# Patient Record
Sex: MALE | Race: BLACK OR AFRICAN AMERICAN | NOT HISPANIC OR LATINO | URBAN - METROPOLITAN AREA
[De-identification: names, ages, dates, MRNs, and addresses within clinical notes are randomized per-mention and may not be internally consistent; named-entity substitution may affect disease eponyms.]

---

## 2020-11-13 ENCOUNTER — INPATIENT (INPATIENT)
Facility: HOSPITAL | Age: 60
LOS: 0 days | Discharge: LEFT BEFORE DISCHARGE | End: 2020-11-13
Attending: INTERNAL MEDICINE | Admitting: INTERNAL MEDICINE
Payer: COMMERCIAL

## 2020-11-13 VITALS
SYSTOLIC BLOOD PRESSURE: 137 MMHG | DIASTOLIC BLOOD PRESSURE: 102 MMHG | TEMPERATURE: 99 F | OXYGEN SATURATION: 100 % | RESPIRATION RATE: 18 BRPM | HEART RATE: 92 BPM

## 2020-11-13 VITALS
DIASTOLIC BLOOD PRESSURE: 88 MMHG | OXYGEN SATURATION: 100 % | RESPIRATION RATE: 20 BRPM | SYSTOLIC BLOOD PRESSURE: 128 MMHG | HEART RATE: 77 BPM

## 2020-11-13 DIAGNOSIS — R06.02 SHORTNESS OF BREATH: ICD-10-CM

## 2020-11-13 LAB
ALBUMIN SERPL ELPH-MCNC: 4.9 G/DL — SIGNIFICANT CHANGE UP (ref 3.3–5)
ALP SERPL-CCNC: 50 U/L — SIGNIFICANT CHANGE UP (ref 40–120)
ALT FLD-CCNC: 58 U/L — HIGH (ref 4–41)
ANION GAP SERPL CALC-SCNC: 10 MMO/L — SIGNIFICANT CHANGE UP (ref 7–14)
APTT BLD: 30.1 SEC — SIGNIFICANT CHANGE UP (ref 27–36.3)
AST SERPL-CCNC: 48 U/L — HIGH (ref 4–40)
BILIRUB SERPL-MCNC: 1.4 MG/DL — HIGH (ref 0.2–1.2)
BUN SERPL-MCNC: 14 MG/DL — SIGNIFICANT CHANGE UP (ref 7–23)
CALCIUM SERPL-MCNC: 10 MG/DL — SIGNIFICANT CHANGE UP (ref 8.4–10.5)
CHLORIDE SERPL-SCNC: 104 MMOL/L — SIGNIFICANT CHANGE UP (ref 98–107)
CO2 SERPL-SCNC: 26 MMOL/L — SIGNIFICANT CHANGE UP (ref 22–31)
CREAT SERPL-MCNC: 1.15 MG/DL — SIGNIFICANT CHANGE UP (ref 0.5–1.3)
D DIMER BLD IA.RAPID-MCNC: 220 NG/ML — SIGNIFICANT CHANGE UP
GLUCOSE SERPL-MCNC: 78 MG/DL — SIGNIFICANT CHANGE UP (ref 70–99)
HCT VFR BLD CALC: 50.5 % — HIGH (ref 39–50)
HGB BLD-MCNC: 16.7 G/DL — SIGNIFICANT CHANGE UP (ref 13–17)
INR BLD: 1.16 — SIGNIFICANT CHANGE UP (ref 0.88–1.16)
MAGNESIUM SERPL-MCNC: 2.1 MG/DL — SIGNIFICANT CHANGE UP (ref 1.6–2.6)
MCHC RBC-ENTMCNC: 28.7 PG — SIGNIFICANT CHANGE UP (ref 27–34)
MCHC RBC-ENTMCNC: 33.1 % — SIGNIFICANT CHANGE UP (ref 32–36)
MCV RBC AUTO: 86.9 FL — SIGNIFICANT CHANGE UP (ref 80–100)
NRBC # FLD: 0 K/UL — SIGNIFICANT CHANGE UP (ref 0–0)
PHOSPHATE SERPL-MCNC: 2.1 MG/DL — LOW (ref 2.5–4.5)
PLATELET # BLD AUTO: 231 K/UL — SIGNIFICANT CHANGE UP (ref 150–400)
PMV BLD: 10.3 FL — SIGNIFICANT CHANGE UP (ref 7–13)
POTASSIUM SERPL-MCNC: 3.9 MMOL/L — SIGNIFICANT CHANGE UP (ref 3.5–5.3)
POTASSIUM SERPL-SCNC: 3.9 MMOL/L — SIGNIFICANT CHANGE UP (ref 3.5–5.3)
PROT SERPL-MCNC: 8.1 G/DL — SIGNIFICANT CHANGE UP (ref 6–8.3)
PROTHROM AB SERPL-ACNC: 13.2 SEC — SIGNIFICANT CHANGE UP (ref 10.6–13.6)
RBC # BLD: 5.81 M/UL — HIGH (ref 4.2–5.8)
RBC # FLD: 13.2 % — SIGNIFICANT CHANGE UP (ref 10.3–14.5)
SODIUM SERPL-SCNC: 140 MMOL/L — SIGNIFICANT CHANGE UP (ref 135–145)
TROPONIN T, HIGH SENSITIVITY: 10 NG/L — SIGNIFICANT CHANGE UP (ref ?–14)
TROPONIN T, HIGH SENSITIVITY: 11 NG/L — SIGNIFICANT CHANGE UP (ref ?–14)
WBC # BLD: 9.78 K/UL — SIGNIFICANT CHANGE UP (ref 3.8–10.5)
WBC # FLD AUTO: 9.78 K/UL — SIGNIFICANT CHANGE UP (ref 3.8–10.5)

## 2020-11-13 PROCEDURE — 93010 ELECTROCARDIOGRAM REPORT: CPT

## 2020-11-13 PROCEDURE — 99285 EMERGENCY DEPT VISIT HI MDM: CPT

## 2020-11-13 PROCEDURE — 71046 X-RAY EXAM CHEST 2 VIEWS: CPT | Mod: 26

## 2020-11-13 NOTE — ED PROVIDER NOTE - CLINICAL SUMMARY MEDICAL DECISION MAKING FREE TEXT BOX
Frieda Sanchez MD: 61yo M with PMH of MVP, HTN, who presents with dizziness, decreased appetite, insomnia, SOB x2 days. Plan for MVP surgery in near future. Dizziness sudden in onset, now resolved. No FNDs on exam. Pt is well appearing, hemodynamically stable. Afebrile. Will check labs, ddimer, troponin, CXR, contact cardiologist 59yo M with PMH of MVP, HTN, who presents with dizziness, decreased appetite, insomnia, SOB x2 days. Plan for MVP surgery in near future. Dizziness sudden in onset, now resolved. No FNDs on exam. Pt is well appearing, hemodynamically stable. Afebrile. Will check labs, ddimer, troponin, CXR, contact cardiologist

## 2020-11-13 NOTE — ED ADULT NURSE NOTE - OBJECTIVE STATEMENT
Patient arrives to the ED for weakness and feeling SOB since yesterday. Patient A&Ox4. Patient denies any chest pain, dizziness,dysuria, trouble moving his bowels, or headache. Patient reports he did not sleep well last night.  Patient reports he is in need of a valve replacement. Patient breathing even and nonlabored. No swelling noted to bilateral upper or lower extremities. Pedal pulses palpable bilaterally. Lung sounds clear to auscultation. Even and equal strength to upper and lower extremities. No arm or leg drift noted bilaterally. No facial droop noted. Speech and clear and concise. 20g IV placed to left arm. Labs sent. Cardiac Monitor on -sinus rhythm.

## 2020-11-13 NOTE — ED PROVIDER NOTE - PHYSICAL EXAMINATION
CONSTITUTIONAL: Nontoxic, well nourished, well developed, elderly male, resting comfortably in no acute distress  HEAD: Normocephalic; atraumatic  EYES: Normal inspection, EOMI  ENMT: External appears normal; normal oropharynx  NECK: Supple; non-tender; no cervical lymphadenopathy  CARD: RRR; holosystolic murmur  RESP: No respiratory distress, lungs ctab/l  ABD: Soft, non-distended; non-tender; no rebound or guarding  EXT: No LE pitting edema or calf tenderness; distal pulses intact with good capillary refill  SKIN: Warm, dry, intact  NEURO: aaox3, CN II-IX intact, 5/5 strength b/l UE and LE, sensation intact in all extremities

## 2020-11-13 NOTE — ED PROVIDER NOTE - ATTENDING CONTRIBUTION TO CARE
I performed a history and physical exam of the patient and discussed their management with the resident. I reviewed the resident's note and agree with the documented findings and plan of care. I have edited as appropriate. My medical decision making and observations are found above.  NAD, non labored breathing

## 2020-11-13 NOTE — ED PROVIDER NOTE - OBJECTIVE STATEMENT
Frieda Sanchez MD: 59yo M with PMH of MVP, HTN, who presents with dizziness, decreased appetite, insomnia, SOB x2 night. Sent by cardiologist. Pt states when he stood up, room was spinning, lasting a few minutes before resolving spontaneous. States that he can "hear his heart pump" and "water go through his stomach." No loss of taste or smell, LOC, vision or hearing changes, fever, CP, hematuria, melena, hematochezia, V/D. Last took oxycodone 5mg on Tuesday, says he gets from friend, takes it for R shoulder pain. Plan for open heart surgery in November. Recent  for godmother on Monday, decreased appetite since then.     Cardiology: Keaton Mann 61yo M with PMH of MVP, HTN, who presents with dizziness, decreased appetite, insomnia, SOB x2 night. Sent by cardiologist. Pt states when he stood up, room was spinning, lasting a few minutes before resolving spontaneous. States that he can "hear his heart pump" and "water go through his stomach." No loss of taste or smell, LOC, vision or hearing changes, fever, CP, hematuria, melena, hematochezia, V/D. Last took oxycodone 5mg on Tuesday, says he gets from friend, takes it for R shoulder pain. Plan for open heart surgery in November. Recent  for godmother on Monday, decreased appetite since then.     Cardiology: Keaton Mann

## 2020-11-13 NOTE — ED PROVIDER NOTE - PROGRESS NOTE DETAILS
Frieda Sanchez MD: Spoke to Dr. Nelson [phone] who states that severe MR from MVP with LA and LV dilated and had been traveling at least 2 hours to his cardiology appointments. Requesting to have pt be evaluated by cardiology team here for more rapid f/u. Labs unremarkable. Trop stable. CXR clear. Will admit to tele Frieda Sanchez MD: The patient is requesting to leave the Emergency Department despite the recommendation of admission to the hospital and/or further testing in the Emergency Department. It has been explained to the patient that leaving prior to completion of work up and treatment may result in recurrent or worsening of symptoms, severe permanent disability, pain and suffering, and/or death. The risks, benefits and treatment alternatives  pertaining to the patient's specific medical issue were discussed.The patient has demonstrated comprehension and verbalizes understanding of these risks.  The patient demonstrates fluent and appropriate speech and coherent thought process. All lab and test results available at the time of the discussion with the patient were communicated to the patient. The patient has been made aware that he/she is welcome to return to the Emergency Department for continued care at any time. Follow up with primary care doctor was recommended as soon as possible. The patient has been given the opportunity to ask questions about their medical condition and had all their questions answered.

## 2020-11-13 NOTE — ED ADULT TRIAGE NOTE - CHIEF COMPLAINT QUOTE
pt amb to triage c/o dizziness associated w/ nausea, decreased PO intake, tiredness, difficulty breathing and insomnia, denies covid contacts, states has been having increased stressors, Hx mitral valve prolapse, "I have 2 holes in my heart" denies CP @ this time, completing sentences
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
## 2020-11-13 NOTE — ED PROVIDER NOTE - NS ED ROS FT
General: denies fever, chills  HENT: denies nasal congestion, sore throat, rhinorrhea  Eyes: denies vision changes  CV: denies chest pain  Resp: +difficulty breathing, denies cough  Abdominal: denies nausea, vomiting, diarrhea, abdominal pain, blood in stool, dark stool  : denies pain with urination  MSK: denies recent trauma  Neuro: denies headaches, numbness, tingling, lightheadedness, +dizziness  Skin: denies new rashes  Endocrine: denies recent weight loss

## 2020-11-14 LAB
B PERT DNA SPEC QL NAA+PROBE: SIGNIFICANT CHANGE UP
C PNEUM DNA SPEC QL NAA+PROBE: SIGNIFICANT CHANGE UP
FLUAV H1 2009 PAND RNA SPEC QL NAA+PROBE: SIGNIFICANT CHANGE UP
FLUAV H1 RNA SPEC QL NAA+PROBE: SIGNIFICANT CHANGE UP
FLUAV H3 RNA SPEC QL NAA+PROBE: SIGNIFICANT CHANGE UP
FLUAV SUBTYP SPEC NAA+PROBE: SIGNIFICANT CHANGE UP
FLUBV RNA SPEC QL NAA+PROBE: SIGNIFICANT CHANGE UP
HADV DNA SPEC QL NAA+PROBE: SIGNIFICANT CHANGE UP
HCOV PNL SPEC NAA+PROBE: SIGNIFICANT CHANGE UP
HMPV RNA SPEC QL NAA+PROBE: SIGNIFICANT CHANGE UP
HPIV1 RNA SPEC QL NAA+PROBE: SIGNIFICANT CHANGE UP
HPIV2 RNA SPEC QL NAA+PROBE: SIGNIFICANT CHANGE UP
HPIV3 RNA SPEC QL NAA+PROBE: SIGNIFICANT CHANGE UP
HPIV4 RNA SPEC QL NAA+PROBE: SIGNIFICANT CHANGE UP
RAPID RVP RESULT: SIGNIFICANT CHANGE UP
RSV RNA SPEC QL NAA+PROBE: SIGNIFICANT CHANGE UP
RV+EV RNA SPEC QL NAA+PROBE: SIGNIFICANT CHANGE UP
SARS-COV-2 RNA SPEC QL NAA+PROBE: SIGNIFICANT CHANGE UP

## 2020-11-16 ENCOUNTER — INPATIENT (INPATIENT)
Facility: HOSPITAL | Age: 60
LOS: 2 days | Discharge: ANOTHER HOSPITAL | End: 2020-11-19
Attending: INTERNAL MEDICINE | Admitting: INTERNAL MEDICINE
Payer: COMMERCIAL

## 2020-11-16 VITALS
SYSTOLIC BLOOD PRESSURE: 122 MMHG | DIASTOLIC BLOOD PRESSURE: 81 MMHG | TEMPERATURE: 98 F | HEART RATE: 99 BPM | OXYGEN SATURATION: 100 % | RESPIRATION RATE: 16 BRPM

## 2020-11-16 NOTE — ED ADULT TRIAGE NOTE - CHIEF COMPLAINT QUOTE
C/o sob and dizziness since Thursday. Seen here on Thursday and was supposed to stay for "mitral valve replacement" but "needed to take care of business so I didn't stay". Denies chest pain, endorses tightness. H/O HTN.

## 2020-11-17 DIAGNOSIS — R06.02 SHORTNESS OF BREATH: ICD-10-CM

## 2020-11-17 DIAGNOSIS — R42 DIZZINESS AND GIDDINESS: ICD-10-CM

## 2020-11-17 DIAGNOSIS — I34.1 NONRHEUMATIC MITRAL (VALVE) PROLAPSE: ICD-10-CM

## 2020-11-17 DIAGNOSIS — I10 ESSENTIAL (PRIMARY) HYPERTENSION: ICD-10-CM

## 2020-11-17 LAB
ALBUMIN SERPL ELPH-MCNC: 4.2 G/DL — SIGNIFICANT CHANGE UP (ref 3.3–5)
ALP SERPL-CCNC: 41 U/L — SIGNIFICANT CHANGE UP (ref 40–120)
ALT FLD-CCNC: 63 U/L — HIGH (ref 4–41)
ANION GAP SERPL CALC-SCNC: 11 MMO/L — SIGNIFICANT CHANGE UP (ref 7–14)
APTT BLD: 27.2 SEC — SIGNIFICANT CHANGE UP (ref 27–36.3)
AST SERPL-CCNC: 45 U/L — HIGH (ref 4–40)
BASE EXCESS BLDV CALC-SCNC: 1.8 MMOL/L — SIGNIFICANT CHANGE UP
BASOPHILS # BLD AUTO: 0.06 K/UL — SIGNIFICANT CHANGE UP (ref 0–0.2)
BASOPHILS NFR BLD AUTO: 0.7 % — SIGNIFICANT CHANGE UP (ref 0–2)
BILIRUB SERPL-MCNC: 0.6 MG/DL — SIGNIFICANT CHANGE UP (ref 0.2–1.2)
BLOOD GAS VENOUS - CREATININE: 1.24 MG/DL — SIGNIFICANT CHANGE UP (ref 0.5–1.3)
BLOOD GAS VENOUS - FIO2: 21 — SIGNIFICANT CHANGE UP
BUN SERPL-MCNC: 20 MG/DL — SIGNIFICANT CHANGE UP (ref 7–23)
CALCIUM SERPL-MCNC: 9.4 MG/DL — SIGNIFICANT CHANGE UP (ref 8.4–10.5)
CHLORIDE BLDV-SCNC: 112 MMOL/L — HIGH (ref 96–108)
CHLORIDE SERPL-SCNC: 106 MMOL/L — SIGNIFICANT CHANGE UP (ref 98–107)
CO2 SERPL-SCNC: 23 MMOL/L — SIGNIFICANT CHANGE UP (ref 22–31)
CREAT SERPL-MCNC: 1.27 MG/DL — SIGNIFICANT CHANGE UP (ref 0.5–1.3)
EOSINOPHIL # BLD AUTO: 0.28 K/UL — SIGNIFICANT CHANGE UP (ref 0–0.5)
EOSINOPHIL NFR BLD AUTO: 3.2 % — SIGNIFICANT CHANGE UP (ref 0–6)
GAS PNL BLDV: 137 MMOL/L — SIGNIFICANT CHANGE UP (ref 136–146)
GLUCOSE BLDV-MCNC: 87 MG/DL — SIGNIFICANT CHANGE UP (ref 70–99)
GLUCOSE SERPL-MCNC: 93 MG/DL — SIGNIFICANT CHANGE UP (ref 70–99)
HCO3 BLDV-SCNC: 26 MMOL/L — SIGNIFICANT CHANGE UP (ref 20–27)
HCT VFR BLD CALC: 42.7 % — SIGNIFICANT CHANGE UP (ref 39–50)
HCT VFR BLDV CALC: 45.8 % — SIGNIFICANT CHANGE UP (ref 39–51)
HGB BLD-MCNC: 14.1 G/DL — SIGNIFICANT CHANGE UP (ref 13–17)
HGB BLDV-MCNC: 14.9 G/DL — SIGNIFICANT CHANGE UP (ref 13–17)
IMM GRANULOCYTES NFR BLD AUTO: 0.3 % — SIGNIFICANT CHANGE UP (ref 0–1.5)
INR BLD: 1.16 — SIGNIFICANT CHANGE UP (ref 0.88–1.16)
LACTATE BLDV-MCNC: 1.4 MMOL/L — SIGNIFICANT CHANGE UP (ref 0.5–2)
LYMPHOCYTES # BLD AUTO: 2.37 K/UL — SIGNIFICANT CHANGE UP (ref 1–3.3)
LYMPHOCYTES # BLD AUTO: 27 % — SIGNIFICANT CHANGE UP (ref 13–44)
MCHC RBC-ENTMCNC: 28.8 PG — SIGNIFICANT CHANGE UP (ref 27–34)
MCHC RBC-ENTMCNC: 33 % — SIGNIFICANT CHANGE UP (ref 32–36)
MCV RBC AUTO: 87.3 FL — SIGNIFICANT CHANGE UP (ref 80–100)
MONOCYTES # BLD AUTO: 0.8 K/UL — SIGNIFICANT CHANGE UP (ref 0–0.9)
MONOCYTES NFR BLD AUTO: 9.1 % — SIGNIFICANT CHANGE UP (ref 2–14)
NEUTROPHILS # BLD AUTO: 5.23 K/UL — SIGNIFICANT CHANGE UP (ref 1.8–7.4)
NEUTROPHILS NFR BLD AUTO: 59.7 % — SIGNIFICANT CHANGE UP (ref 43–77)
NRBC # FLD: 0 K/UL — SIGNIFICANT CHANGE UP (ref 0–0)
NT-PROBNP SERPL-SCNC: 121.9 PG/ML — SIGNIFICANT CHANGE UP
PCO2 BLDV: 39 MMHG — LOW (ref 41–51)
PH BLDV: 7.44 PH — HIGH (ref 7.32–7.43)
PLATELET # BLD AUTO: 186 K/UL — SIGNIFICANT CHANGE UP (ref 150–400)
PMV BLD: 10.2 FL — SIGNIFICANT CHANGE UP (ref 7–13)
PO2 BLDV: 63 MMHG — HIGH (ref 35–40)
POTASSIUM BLDV-SCNC: 3.5 MMOL/L — SIGNIFICANT CHANGE UP (ref 3.4–4.5)
POTASSIUM SERPL-MCNC: 3.9 MMOL/L — SIGNIFICANT CHANGE UP (ref 3.5–5.3)
POTASSIUM SERPL-SCNC: 3.9 MMOL/L — SIGNIFICANT CHANGE UP (ref 3.5–5.3)
PROT SERPL-MCNC: 6.7 G/DL — SIGNIFICANT CHANGE UP (ref 6–8.3)
PROTHROM AB SERPL-ACNC: 13.2 SEC — SIGNIFICANT CHANGE UP (ref 10.6–13.6)
RBC # BLD: 4.89 M/UL — SIGNIFICANT CHANGE UP (ref 4.2–5.8)
RBC # FLD: 13.5 % — SIGNIFICANT CHANGE UP (ref 10.3–14.5)
SAO2 % BLDV: 92.6 % — HIGH (ref 60–85)
SARS-COV-2 RNA SPEC QL NAA+PROBE: SIGNIFICANT CHANGE UP
SODIUM SERPL-SCNC: 140 MMOL/L — SIGNIFICANT CHANGE UP (ref 135–145)
TROPONIN T, HIGH SENSITIVITY: 10 NG/L — SIGNIFICANT CHANGE UP (ref ?–14)
TROPONIN T, HIGH SENSITIVITY: 10 NG/L — SIGNIFICANT CHANGE UP (ref ?–14)
WBC # BLD: 8.77 K/UL — SIGNIFICANT CHANGE UP (ref 3.8–10.5)
WBC # FLD AUTO: 8.77 K/UL — SIGNIFICANT CHANGE UP (ref 3.8–10.5)

## 2020-11-17 PROCEDURE — 99223 1ST HOSP IP/OBS HIGH 75: CPT

## 2020-11-17 PROCEDURE — 99284 EMERGENCY DEPT VISIT MOD MDM: CPT

## 2020-11-17 PROCEDURE — 93306 TTE W/DOPPLER COMPLETE: CPT | Mod: 26

## 2020-11-17 PROCEDURE — 71045 X-RAY EXAM CHEST 1 VIEW: CPT | Mod: 26

## 2020-11-17 RX ORDER — INFLUENZA VIRUS VACCINE 15; 15; 15; 15 UG/.5ML; UG/.5ML; UG/.5ML; UG/.5ML
0.5 SUSPENSION INTRAMUSCULAR ONCE
Refills: 0 | Status: DISCONTINUED | OUTPATIENT
Start: 2020-11-17 | End: 2020-11-19

## 2020-11-17 RX ORDER — ACETAMINOPHEN 500 MG
650 TABLET ORAL EVERY 6 HOURS
Refills: 0 | Status: DISCONTINUED | OUTPATIENT
Start: 2020-11-17 | End: 2020-11-19

## 2020-11-17 RX ORDER — AMLODIPINE BESYLATE 2.5 MG/1
10 TABLET ORAL DAILY
Refills: 0 | Status: DISCONTINUED | OUTPATIENT
Start: 2020-11-17 | End: 2020-11-19

## 2020-11-17 RX ORDER — ENOXAPARIN SODIUM 100 MG/ML
40 INJECTION SUBCUTANEOUS DAILY
Refills: 0 | Status: DISCONTINUED | OUTPATIENT
Start: 2020-11-17 | End: 2020-11-19

## 2020-11-17 RX ORDER — CYCLOBENZAPRINE HYDROCHLORIDE 10 MG/1
5 TABLET, FILM COATED ORAL THREE TIMES A DAY
Refills: 0 | Status: DISCONTINUED | OUTPATIENT
Start: 2020-11-17 | End: 2020-11-19

## 2020-11-17 RX ORDER — LOSARTAN POTASSIUM 100 MG/1
100 TABLET, FILM COATED ORAL DAILY
Refills: 0 | Status: DISCONTINUED | OUTPATIENT
Start: 2020-11-17 | End: 2020-11-19

## 2020-11-17 RX ADMIN — AMLODIPINE BESYLATE 10 MILLIGRAM(S): 2.5 TABLET ORAL at 13:35

## 2020-11-17 RX ADMIN — ENOXAPARIN SODIUM 40 MILLIGRAM(S): 100 INJECTION SUBCUTANEOUS at 13:35

## 2020-11-17 RX ADMIN — LOSARTAN POTASSIUM 100 MILLIGRAM(S): 100 TABLET, FILM COATED ORAL at 13:35

## 2020-11-17 NOTE — ED ADULT NURSE NOTE - OBJECTIVE STATEMENT
break coverage RN - pt received in room 16 A&OX3 c/o SOB & lightheadedness. Pt seen here on Thursday but AMA'd due to personal reasons. Pt came back today to be admitted for cardiac evaluation. Denies CP, fevers, N/V/D, abdominal pain. NSR on cardiac monitor. +SOB on exertion. Resp even and unlabored. 20G IV placed to L AC. Labs drawn and sent. Will continue to monitor.

## 2020-11-17 NOTE — PATIENT PROFILE ADULT - SURGICAL SITE INCISION
HCS received via drop-off. Form to be completed and picked up to mother (Sanjana Banuelos) at [phone]. Form placed in NIKITA Cuevas folder at the .    [facility]: 12/02/2019   Provider: Jo Ann  Sibling (? Of ?): 1 of 2  DEEDEE attached (Y/N)? N    Thank You,  Matthias Lemus   no

## 2020-11-17 NOTE — H&P ADULT - NSHPLABSRESULTS_GEN_ALL_CORE
11-17    140  |  106  |  20  ----------------------------<  93  3.9   |  23  |  1.27    Ca    9.4      17 Nov 2020 00:40    TPro  6.7  /  Alb  4.2  /  TBili  0.6  /  DBili  x   /  AST  45<H>  /  ALT  63<H>  /  AlkPhos  41  11-17                            14.1   8.77  )-----------( 186      ( 17 Nov 2020 00:40 )             42.7       Troponin T, High Sensitivity: 10 ng/L (11-17-20 @ 01:50)  Troponin T, High Sensitivity: 10 ng/L (11-17-20 @ 00:40)      Serum Pro-Brain Natriuretic Peptide: 121.9 pg/mL (11-17-20 @ 00:40)      PT/INR - ( 17 Nov 2020 00:40 )   PT: 13.2 SEC;   INR: 1.16          PTT - ( 17 Nov 2020 00:40 )  PTT:27.2 SEC
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
## 2020-11-17 NOTE — SBIRT NOTE ADULT - NSSBIRTDRGPOSREINDET_GEN_A_CORE
Full screen positive. Brief Intervention Performed. Passive Referral To Treatment Attempted. Screening results were reviewed with the patient and patient was provided information about healthy guidelines and potential negative consequences associated with level of risk. Motivation and readiness to reduce or stop use was discussed and goals and activities to make changes were suggested/offered. Options discussed for further evaluation and treatment, but referral to treatment was not completed because: Patient refused

## 2020-11-17 NOTE — H&P ADULT - NSHPREVIEWOFSYSTEMS_GEN_ALL_CORE
REVIEW OF SYSTEMS    General:  Negative  Skin/Breast:  Negative  Ophthalmologic:  Negative  ENMT:  Negative  Respiratory and Thorax: MEDELLIN, no cough  Cardiovascular:  orthopnea, no cp or palpitations  Gastrointestinal:  Negative  Genitourinary:  Negative  Musculoskeletal:  Negative  Neurological:  dizziness  Psychiatric:  Negative  Hematology/Lymphatics:  Negative	  Endocrine:  Negative  Allergic/Immunologic: Negative

## 2020-11-17 NOTE — ED ADULT NURSE REASSESSMENT NOTE - NS ED NURSE REASSESS COMMENT FT1
report received from DONNY Vo. pt resting comfortably in stretcher, denies CP SOB palpitations, rpt trop sent, pending admission, no further interventions, will monitor

## 2020-11-17 NOTE — PATIENT PROFILE ADULT - PATIENT REPRESENTATIVE: ( YOU CAN CHOOSE ANY PERSON THAT CAN ASSIST YOU WITH YOUR HEALTH CARE PREFERENCES, DOES NOT HAVE TO BE A SPOUSE, IMMEDIATE FAMILY OR SIGNIFICANT OTHER/PARTNER)
Verbal order provided to the pharmacist Bindu at [facility]. No further questions at this time.    declines

## 2020-11-17 NOTE — SBIRT NOTE ADULT - NSSBIRTBRIEFINTDET_GEN_A_CORE
Pt receptive to engagement in consult and agreeable to reviewing healthy drinking guidelines. This writer provided pt with ARS/DASANDYRS Addiction Services at [facility]: [address], [address], [address] [place] [address] p: [phone], [facility] walk in clinic p:[phone], [facility] Treatment/Care Services for Substance Use List, and [facility] For Tobacco Control Information: [address] [place] [address] , [place] [address] p: [phone], as pt endorses recent titration down from cigarette use and desire to fully refrain from use. This writer additionally reviewed "Rethink Drinking" booklet from [employer] - [place] Department of Health and Human Services.

## 2020-11-17 NOTE — ED PROVIDER NOTE - ATTENDING CONTRIBUTION TO CARE
59 y/o M with h/o HTN, active smoker p/w SOB and dizziness.  Pt reports 7+ months of progressive SOB with exertion, associated with dizziness and chest tightness.  Pt reports he now cannot walk a block without getting winded, whereas he previously was able to walk 5 blocks.  No fever, chills, back pain, abd pain, n/v, leg pain or swelling.  He had an outpatient echo by his cardiologist Dr Mann for pre-op clearance in Oct 2020 and found to have severe MR.  Pt was scheduled for outpatient surgical repair, but due to travel issues (cardiologist is in [place] and pt lives in [place]), he has been having difficulty following up.  Pt's cardiologist referred him to [facility] to transfer care here.  He was evaluated in the ED on 11/13 and left AMA prior to admission.  Well appearing, lying comfortably in stretcher, awake and alert, nontoxic.  VSS.  Lungs cta bl.  Cards nl S1/S2, RRR, no MRG.  Abd soft ntnd.  No pedal edema or calf tenderness.
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
## 2020-11-17 NOTE — ED PROVIDER NOTE - CLINICAL SUMMARY MEDICAL DECISION MAKING FREE TEXT BOX
Presents with shortness of breath and lightheadedness ongoing for few days, no change. Presents to be evaluated by cardiology for MV repair. Will do labs to eval for pneumonia, ACS - low suspicion. Admit to tele doc of the day.

## 2020-11-17 NOTE — ED PROVIDER NOTE - OBJECTIVE STATEMENT
61yo M with PMH of MVP, HTN, who presents few days of shortness of breath and lightheadedness. Seen here 2 days ago - was supposed to be admitted but pt AMA for personal reasons. Came today to be admitted for cardiology evaluation. No change in sxs since he was last seen and denies any new symptoms. Denies fever, chills, nausea, vomiting, diarrhea, chest pain, cough, leg swelling, urinary complains.   Cardiologist: Dr. Keaton Mann [phone] 59yo M with PMH of MVP, HTN, who presents with several months of shortness of breath and lightheadedness. Seen here 2 days ago - was supposed to be admitted but pt AMA for personal reasons. Came today to be admitted for cardiology evaluation. No change in sxs since he was last seen and denies any new symptoms. Denies fever, chills, nausea, vomiting, diarrhea, chest pain, cough, leg swelling, urinary complains.   Cardiologist: Dr. Keaton Mann [phone] 59yo M with PMH of MVP, HTN, who presents with several months of shortness of breath and lightheadedness. Seen here 2 days ago - was supposed to be admitted for cardiology workup for MVP (team at that had spoken with his cardiologist) but pt AMA for personal reasons. Came today to be admitted for cardiology evaluation. No change in sxs since he was last seen and denies any new symptoms. Denies fever, chills, nausea, vomiting, diarrhea, chest pain, cough, leg swelling, urinary complains.   Cardiologist: Dr. Keaton Mann [phone]

## 2020-11-17 NOTE — ED PROVIDER NOTE - PHYSICAL EXAMINATION
Gen: well developed and well nourished, NAD  CV: +murmur worse at apical region  Resp: CTAB, symmetric breath sounds  GI: abdomen soft non-distended, NTTP  Extremities - no edema  Skin: no rashes, colors changes or bruising  Neuro: A&Ox3, following commands, speech clear, moving all four extremities spontaneously  Psych: appropriate mood, normal insight

## 2020-11-17 NOTE — SBIRT NOTE ADULT - NSSBIRTALCPOSREINDET_GEN_A_CORE
Pt AAOx3, euthymic mood and congruent affect, speech clear and concise, behavior appropriate to this writer. Full screen positive. Brief Intervention Performed. Passive Referral To Treatment Attempted. Screening results were reviewed with the patient and patient was provided information about healthy guidelines and potential negative consequences associated with level of risk. Motivation and readiness to reduce or stop use was discussed and goals and activities to make changes were suggested/offered. Options discussed for further evaluation and treatment, but referral to treatment was not completed because: Patient refused   Pt declines interest in connecting to inpt/outpt/detox or AA meetings at this time.

## 2020-11-17 NOTE — H&P ADULT - NSHPPHYSICALEXAM_GEN_ALL_CORE
Vital Signs Last 24 Hrs  T(C): 36.8 (17 Nov 2020 04:41), Max: 36.9 (16 Nov 2020 23:14)  T(F): 98.2 (17 Nov 2020 04:41), Max: 98.5 (16 Nov 2020 23:14)  HR: 64 (17 Nov 2020 04:41) (64 - 99)  BP: 105/71 (17 Nov 2020 04:41) (105/71 - 122/81)  BP(mean): --  RR: 18 (17 Nov 2020 04:41) (16 - 18)  SpO2: 100% (17 Nov 2020 04:41) (99% - 100%)    PHYSICAL EXAM:  GENERAL: No Acute Distress  EYES: conjunctiva and sclera clear  ENMT: Moist mucous membranes   NECK: Supple  PULMONARY: Clear to auscultation bilaterally  CARDIAC: Regular rate and rhythm; 3/6 holosystolic murmur with diminished S2  GASTROINTESTINAL: Abdomen soft, Nontender, Nondistended; Bowel sounds normal  EXTREMITIES:   No clubbing, cyanosis, or pedal edema  PSYCH: Normal Affect, Normal Behavior  NEUROLOGY:   - Mental status A&O x 3,  SKIN: No rashes or lesions  MUSCULOSKELETAL: No joint swelling
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
## 2020-11-17 NOTE — SBIRT NOTE ADULT - NSSBIRTAUDITDRGSCORE_GEN_A_CORE_CAL
[facility] Outreach  A Part of the [facility] First Episode of Psychosis Program     Patient Name: Eloy Storm  /Age:  2001 (17 year old)    Contact: Writer received VM from Eloy's Mom-Michelle with additional observations recently that Eloy seems to be withdrawing in some ways socially, for example, not wanting to go into public places/restaurants etc. Michelle asked that writer pass this along to [facility]IGATE FÁTIMA Tilley LGSW for consideration in her work with Eloy.     Writer called Michelle back and acknowledged information shared and plan to pass along to NAVIGATE FÁTIMA Tilley LGSW. Additionally informed Michelle Lyons did not make his appointment with Katelynn on this date and he has missed a number of his recent appointments. Mom plans to follow-up with him regarding this and also writer verbalized plan for NAVIGATE FÁTIMA Tilley LGSW to reach out to Eloy to discuss engagement in the program.     Plan: Will route note to team. Next family session scheduled on . Client and family aware they can reach out to writer directly and/or [facility] team should concerns or needs arise prior to next scheduled appointment.     Keisha Charles AM, LGSW  [facility] [place] [facility]     3

## 2020-11-17 NOTE — SBIRT NOTE ADULT - NSSBIRTNALOXDUR_GEN_A_CORE
Call for yellowing of skin, fevers, enlarged spleen, pale skin, brown urine  Continue folic acid daily  Follow up once a year   30

## 2020-11-17 NOTE — SBIRT NOTE ADULT - NSSBIRTDRGBRIEFINTDET_GEN_A_CORE
This writer reviewed Mja Effects on the Body literature. Motivational interviewing provided and risk reduction discussed. This writer educated and encouraged pt to discuss medical mja card with PCP, if deemed medically eligible.   This writer reviewed Opioid/Heroine Effects on the Body literature

## 2020-11-18 LAB
ALBUMIN SERPL ELPH-MCNC: 4.1 G/DL — SIGNIFICANT CHANGE UP (ref 3.3–5)
ALP SERPL-CCNC: 39 U/L — LOW (ref 40–120)
ALT FLD-CCNC: 61 U/L — HIGH (ref 4–41)
ANION GAP SERPL CALC-SCNC: 11 MMO/L — SIGNIFICANT CHANGE UP (ref 7–14)
ANION GAP SERPL CALC-SCNC: 11 MMO/L — SIGNIFICANT CHANGE UP (ref 7–14)
AST SERPL-CCNC: 41 U/L — HIGH (ref 4–40)
BILIRUB SERPL-MCNC: 0.8 MG/DL — SIGNIFICANT CHANGE UP (ref 0.2–1.2)
BUN SERPL-MCNC: 20 MG/DL — SIGNIFICANT CHANGE UP (ref 7–23)
BUN SERPL-MCNC: 20 MG/DL — SIGNIFICANT CHANGE UP (ref 7–23)
CALCIUM SERPL-MCNC: 9.1 MG/DL — SIGNIFICANT CHANGE UP (ref 8.4–10.5)
CALCIUM SERPL-MCNC: 9.1 MG/DL — SIGNIFICANT CHANGE UP (ref 8.4–10.5)
CHLORIDE SERPL-SCNC: 106 MMOL/L — SIGNIFICANT CHANGE UP (ref 98–107)
CHLORIDE SERPL-SCNC: 106 MMOL/L — SIGNIFICANT CHANGE UP (ref 98–107)
CO2 SERPL-SCNC: 22 MMOL/L — SIGNIFICANT CHANGE UP (ref 22–31)
CO2 SERPL-SCNC: 22 MMOL/L — SIGNIFICANT CHANGE UP (ref 22–31)
CREAT SERPL-MCNC: 1.21 MG/DL — SIGNIFICANT CHANGE UP (ref 0.5–1.3)
CREAT SERPL-MCNC: 1.21 MG/DL — SIGNIFICANT CHANGE UP (ref 0.5–1.3)
GLUCOSE SERPL-MCNC: 79 MG/DL — SIGNIFICANT CHANGE UP (ref 70–99)
GLUCOSE SERPL-MCNC: 79 MG/DL — SIGNIFICANT CHANGE UP (ref 70–99)
HCT VFR BLD CALC: 44.7 % — SIGNIFICANT CHANGE UP (ref 39–50)
HCV AB S/CO SERPL IA: 6.13 S/CO — HIGH (ref 0–0.99)
HCV AB SERPL-IMP: REACTIVE — HIGH
HGB BLD-MCNC: 14.3 G/DL — SIGNIFICANT CHANGE UP (ref 13–17)
MAGNESIUM SERPL-MCNC: 2 MG/DL — SIGNIFICANT CHANGE UP (ref 1.6–2.6)
MCHC RBC-ENTMCNC: 28.5 PG — SIGNIFICANT CHANGE UP (ref 27–34)
MCHC RBC-ENTMCNC: 32 % — SIGNIFICANT CHANGE UP (ref 32–36)
MCV RBC AUTO: 89.2 FL — SIGNIFICANT CHANGE UP (ref 80–100)
NRBC # FLD: 0 K/UL — SIGNIFICANT CHANGE UP (ref 0–0)
PHOSPHATE SERPL-MCNC: 2.8 MG/DL — SIGNIFICANT CHANGE UP (ref 2.5–4.5)
PLATELET # BLD AUTO: 179 K/UL — SIGNIFICANT CHANGE UP (ref 150–400)
PMV BLD: 10.7 FL — SIGNIFICANT CHANGE UP (ref 7–13)
POTASSIUM SERPL-MCNC: 3.9 MMOL/L — SIGNIFICANT CHANGE UP (ref 3.5–5.3)
POTASSIUM SERPL-MCNC: 3.9 MMOL/L — SIGNIFICANT CHANGE UP (ref 3.5–5.3)
POTASSIUM SERPL-SCNC: 3.9 MMOL/L — SIGNIFICANT CHANGE UP (ref 3.5–5.3)
POTASSIUM SERPL-SCNC: 3.9 MMOL/L — SIGNIFICANT CHANGE UP (ref 3.5–5.3)
PROT SERPL-MCNC: 6.2 G/DL — SIGNIFICANT CHANGE UP (ref 6–8.3)
RBC # BLD: 5.01 M/UL — SIGNIFICANT CHANGE UP (ref 4.2–5.8)
RBC # FLD: 13.4 % — SIGNIFICANT CHANGE UP (ref 10.3–14.5)
SODIUM SERPL-SCNC: 139 MMOL/L — SIGNIFICANT CHANGE UP (ref 135–145)
SODIUM SERPL-SCNC: 139 MMOL/L — SIGNIFICANT CHANGE UP (ref 135–145)
WBC # BLD: 7.67 K/UL — SIGNIFICANT CHANGE UP (ref 3.8–10.5)
WBC # FLD AUTO: 7.67 K/UL — SIGNIFICANT CHANGE UP (ref 3.8–10.5)

## 2020-11-18 PROCEDURE — 93312 ECHO TRANSESOPHAGEAL: CPT | Mod: 26

## 2020-11-18 PROCEDURE — 93880 EXTRACRANIAL BILAT STUDY: CPT | Mod: 26

## 2020-11-18 PROCEDURE — 93325 DOPPLER ECHO COLOR FLOW MAPG: CPT | Mod: 26,GC

## 2020-11-18 PROCEDURE — 99232 SBSQ HOSP IP/OBS MODERATE 35: CPT

## 2020-11-18 PROCEDURE — 93320 DOPPLER ECHO COMPLETE: CPT | Mod: 26,GC

## 2020-11-18 RX ADMIN — AMLODIPINE BESYLATE 10 MILLIGRAM(S): 2.5 TABLET ORAL at 06:24

## 2020-11-18 RX ADMIN — LOSARTAN POTASSIUM 100 MILLIGRAM(S): 100 TABLET, FILM COATED ORAL at 06:24

## 2020-11-18 RX ADMIN — ENOXAPARIN SODIUM 40 MILLIGRAM(S): 100 INJECTION SUBCUTANEOUS at 12:33

## 2020-11-18 NOTE — PROGRESS NOTE ADULT - SUBJECTIVE AND OBJECTIVE BOX
Cardiology/Vascular Medicine Inpatient Progress Note    No current cardiac complaints  Denies having CP, SOB, palpitations    Reviewed TTE findings with patient   Plan for PATEL today  Probable LHC and CTS evaluation tomorrow    No significant interval events noted on telemetry    Vital Signs Last 24 Hrs  T(C): 36.4 (18 Nov 2020 06:22), Max: 37.1 (17 Nov 2020 21:30)  T(F): 97.6 (18 Nov 2020 06:22), Max: 98.8 (17 Nov 2020 21:30)  HR: 68 (18 Nov 2020 06:22) (64 - 70)  BP: 118/87 (18 Nov 2020 06:22) (111/76 - 135/92)  BP(mean): --  RR: 18 (18 Nov 2020 06:22) (18 - 18)  SpO2: 100% (18 Nov 2020 06:22) (99% - 100%)    Appearance: NAD, laying flat in bed  HEENT:   No JVD  Cardiovascular: Normal S1 S2, 3/6 SM  Respiratory: Decreased breath sounds bilateral bases  Psychiatry: Awake, alert  Gastrointestinal:  Soft, Non-tender, + BS	  Extremities: No edema    MEDICATIONS  (STANDING):  amLODIPine   Tablet 10 milliGRAM(s) Oral daily  enoxaparin Injectable 40 milliGRAM(s) SubCutaneous daily  influenza   Vaccine 0.5 milliLiter(s) IntraMuscular once  losartan 100 milliGRAM(s) Oral daily    MEDICATIONS  (PRN):  acetaminophen   Tablet .. 650 milliGRAM(s) Oral every 6 hours PRN Temp greater or equal to 38C (100.4F), Moderate Pain (4 - 6), Severe Pain (7 - 10)  cyclobenzaprine 5 milliGRAM(s) Oral three times a day PRN Muscle Spasm      LABS:	 	                                   14.3   7.67  )-----------( 179      ( 18 Nov 2020 05:27 )             44.7   11-17    140  |  106  |  20  ----------------------------<  93  3.9   |  23  |  1.27    Ca    9.4      17 Nov 2020 00:40    TPro  6.7  /  Alb  4.2  /  TBili  0.6  /  DBili  x   /  AST  45<H>  /  ALT  63<H>  /  AlkPhos  41  11-17                  proBNP: Serum Pro-Brain Natriuretic Peptide: 121.9 pg/mL (11-17-20 @ 00:40)    < from: Xray Chest 1 View- PORTABLE-Urgent (Xray Chest 1 View- PORTABLE-Urgent .) (11.17.20 @ 00:52) >    EXAM:  XR CHEST PORTABLE URGENT 1V        PROCEDURE DATE:  Nov 17 2020         INTERPRETATION:  CLINICAL INDICATION: Shortness of breath    TECHNIQUE: Single view of the chest was obtained.    COMPARISON: Chest radiograph 11/13/2020.    FINDINGS:    Lungs are clear. No pleural effusion or pneumothorax.  Cardiac size is normal. No acute osseous abnormality.    IMPRESSION:  No focal consolidation    PARAMJIT TURNER MD; Resident Radiology  This document has been electronically signed.  CATARINO REHMAN MD; Attending Radiologist  This document has been electronically signed. Nov 17 2020  7:24AM    < end of copied text >	    ec< from: Transthoracic Echocardiogram (11.17.20 @ 05:43) >    Patient name: HEATH ORTEZ  YOB: 1960   Age: 60 (M)   MR#: [id number]  Study Date: 11/17/2020  [facility]  Study quality: Technically good  Referring Physician: Gianluca Bush MD  Blood Pressure: 117/70 mmHg  Height: 175 cm  Weight: 76 kg  BSA: 1.9 m2  ------------------------------------------------------------------------  PROCEDURE: Transthoracic echocardiogram with 2-D, M-Mode  and complete spectral and color flow Doppler.  INDICATION: Nonrheumatic mitral (valve) prolapse (I34.1)  ------------------------------------------------------------------------  DIMENSIONS:  Dimensions:     Normal Values:  LA:     4.9 cm    2.0 - 4.0 cm  Ao:     3.5 cm    2.0 - 3.8 cm  SEPTUM: 0.8 cm    0.6 - 1.2 cm  PWT:    0.8 cm    0.6 - 1.1 cm  LVIDd:  5.9 cm    3.0 - 5.6 cm  LVIDs:  3.7 cm    1.8 - 4.0 cm  Derived Variables:  LVMI: 94 g/m2  RWT: 0.27  Fractional short: 37 %  Ejection Fraction (Teicholtz): 66 %  ------------------------------------------------------------------------  OBSERVATIONS:  Mitral Valve: Mitral valve prolapse involving the posterior  mitral leaflet. Severe mitral regurgitation with an  eccentric, anteriorly directed jet.  Aortic Root: Normal aortic root.  Aortic Valve: Calcified trileaflet aortic valve with normal  opening. Minimal aortic regurgitation.  Left Atrium: Moderately dilated left atrium.  LA volume  index = 48 cc/m2.  Left Ventricle: Normal left ventricular systolic function.  No segmental wall motion abnormalities. Mild left  ventricular enlargement.  Right Heart: Normal right atrium. Normal right ventricular  size and function. Normal tricuspid valve.  Mild tricuspid  regurgitation. Normal pulmonic valve.  Pericardium/PleuraNormal pericardium with no pericardial  effusion.  ------------------------------------------------------------------------  CONCLUSIONS:  1. Mitral valve prolapse involving the posterior mitral  leaflet. Severe mitral regurgitation with an eccentric,  anteriorly directed jet.  2. Moderately dilated left atrium.  LA volume index = 48  cc/m2.  3. Mild left ventricular enlargement.  4. Normal left ventricular systolic function. No segmental  wall motion abnormalities.  5. Normal right ventricular size and function.  Consider PATEL for further evaluation of the mitral valve, if  clinically indicated.  ------------------------------------------------------------------------  Confirmed on  11/17/2020 - 16:12:37 by Anthony Montejo M.D.  ------------------------------------------------------------------------    < end of copied text >
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
## 2020-11-19 ENCOUNTER — TRANSCRIPTION ENCOUNTER (OUTPATIENT)
Age: 60
End: 2020-11-19

## 2020-11-19 ENCOUNTER — INPATIENT (INPATIENT)
Facility: HOSPITAL | Age: 60
LOS: 7 days | Discharge: HOME | DRG: 219 | End: 2020-11-27
Attending: THORACIC SURGERY (CARDIOTHORACIC VASCULAR SURGERY) | Admitting: THORACIC SURGERY (CARDIOTHORACIC VASCULAR SURGERY)
Payer: COMMERCIAL

## 2020-11-19 VITALS
SYSTOLIC BLOOD PRESSURE: 126 MMHG | RESPIRATION RATE: 18 BRPM | DIASTOLIC BLOOD PRESSURE: 81 MMHG | TEMPERATURE: 99 F | OXYGEN SATURATION: 98 % | HEART RATE: 71 BPM

## 2020-11-19 VITALS
DIASTOLIC BLOOD PRESSURE: 77 MMHG | SYSTOLIC BLOOD PRESSURE: 135 MMHG | HEART RATE: 72 BPM | TEMPERATURE: 98 F | RESPIRATION RATE: 18 BRPM | OXYGEN SATURATION: 100 %

## 2020-11-19 DIAGNOSIS — I34.1 NONRHEUMATIC MITRAL (VALVE) PROLAPSE: ICD-10-CM

## 2020-11-19 DIAGNOSIS — I10 ESSENTIAL (PRIMARY) HYPERTENSION: ICD-10-CM

## 2020-11-19 DIAGNOSIS — I25.10 ATHEROSCLEROTIC HEART DISEASE OF NATIVE CORONARY ARTERY WITHOUT ANGINA PECTORIS: ICD-10-CM

## 2020-11-19 LAB
ANION GAP SERPL CALC-SCNC: 10 MMO/L — SIGNIFICANT CHANGE UP (ref 7–14)
BASOPHILS # BLD AUTO: 0.03 K/UL — SIGNIFICANT CHANGE UP (ref 0–0.2)
BASOPHILS NFR BLD AUTO: 0.3 % — SIGNIFICANT CHANGE UP (ref 0–2)
BUN SERPL-MCNC: 22 MG/DL — SIGNIFICANT CHANGE UP (ref 7–23)
CALCIUM SERPL-MCNC: 8.7 MG/DL — SIGNIFICANT CHANGE UP (ref 8.4–10.5)
CHLORIDE SERPL-SCNC: 105 MMOL/L — SIGNIFICANT CHANGE UP (ref 98–107)
CO2 SERPL-SCNC: 23 MMOL/L — SIGNIFICANT CHANGE UP (ref 22–31)
CREAT SERPL-MCNC: 1.22 MG/DL — SIGNIFICANT CHANGE UP (ref 0.5–1.3)
EOSINOPHIL # BLD AUTO: 0.22 K/UL — SIGNIFICANT CHANGE UP (ref 0–0.5)
EOSINOPHIL NFR BLD AUTO: 2.4 % — SIGNIFICANT CHANGE UP (ref 0–6)
GLUCOSE SERPL-MCNC: 86 MG/DL — SIGNIFICANT CHANGE UP (ref 70–99)
HCT VFR BLD CALC: 41.5 % — SIGNIFICANT CHANGE UP (ref 39–50)
HCT VFR BLD CALC: 45.1 % — SIGNIFICANT CHANGE UP (ref 39–50)
HGB BLD-MCNC: 13.7 G/DL — SIGNIFICANT CHANGE UP (ref 13–17)
HGB BLD-MCNC: 14.7 G/DL — SIGNIFICANT CHANGE UP (ref 13–17)
IMM GRANULOCYTES NFR BLD AUTO: 0.6 % — SIGNIFICANT CHANGE UP (ref 0–1.5)
LYMPHOCYTES # BLD AUTO: 1.79 K/UL — SIGNIFICANT CHANGE UP (ref 1–3.3)
LYMPHOCYTES # BLD AUTO: 19.8 % — SIGNIFICANT CHANGE UP (ref 13–44)
MAGNESIUM SERPL-MCNC: 2 MG/DL — SIGNIFICANT CHANGE UP (ref 1.6–2.6)
MCHC RBC-ENTMCNC: 28.8 PG — SIGNIFICANT CHANGE UP (ref 27–34)
MCHC RBC-ENTMCNC: 29.2 PG — SIGNIFICANT CHANGE UP (ref 27–34)
MCHC RBC-ENTMCNC: 32.6 GM/DL — SIGNIFICANT CHANGE UP (ref 32–36)
MCHC RBC-ENTMCNC: 33 % — SIGNIFICANT CHANGE UP (ref 32–36)
MCV RBC AUTO: 87.2 FL — SIGNIFICANT CHANGE UP (ref 80–100)
MCV RBC AUTO: 89.5 FL — SIGNIFICANT CHANGE UP (ref 80–100)
MONOCYTES # BLD AUTO: 0.72 K/UL — SIGNIFICANT CHANGE UP (ref 0–0.9)
MONOCYTES NFR BLD AUTO: 8 % — SIGNIFICANT CHANGE UP (ref 2–14)
NEUTROPHILS # BLD AUTO: 6.21 K/UL — SIGNIFICANT CHANGE UP (ref 1.8–7.4)
NEUTROPHILS NFR BLD AUTO: 68.9 % — SIGNIFICANT CHANGE UP (ref 43–77)
NRBC # BLD: 0 /100 WBCS — SIGNIFICANT CHANGE UP (ref 0–0)
NRBC # FLD: 0 K/UL — SIGNIFICANT CHANGE UP (ref 0–0)
PHOSPHATE SERPL-MCNC: 3.2 MG/DL — SIGNIFICANT CHANGE UP (ref 2.5–4.5)
PLATELET # BLD AUTO: 168 K/UL — SIGNIFICANT CHANGE UP (ref 150–400)
PLATELET # BLD AUTO: 174 K/UL — SIGNIFICANT CHANGE UP (ref 150–400)
PMV BLD: 9.5 FL — SIGNIFICANT CHANGE UP (ref 7–13)
POTASSIUM SERPL-MCNC: 3.8 MMOL/L — SIGNIFICANT CHANGE UP (ref 3.5–5.3)
POTASSIUM SERPL-SCNC: 3.8 MMOL/L — SIGNIFICANT CHANGE UP (ref 3.5–5.3)
RBC # BLD: 4.76 M/UL — SIGNIFICANT CHANGE UP (ref 4.2–5.8)
RBC # BLD: 5.04 M/UL — SIGNIFICANT CHANGE UP (ref 4.2–5.8)
RBC # FLD: 13.3 % — SIGNIFICANT CHANGE UP (ref 10.3–14.5)
RBC # FLD: 13.4 % — SIGNIFICANT CHANGE UP (ref 10.3–14.5)
SODIUM SERPL-SCNC: 138 MMOL/L — SIGNIFICANT CHANGE UP (ref 135–145)
WBC # BLD: 7.13 K/UL — SIGNIFICANT CHANGE UP (ref 3.8–10.5)
WBC # BLD: 9.02 K/UL — SIGNIFICANT CHANGE UP (ref 3.8–10.5)
WBC # FLD AUTO: 7.13 K/UL — SIGNIFICANT CHANGE UP (ref 3.8–10.5)
WBC # FLD AUTO: 9.02 K/UL — SIGNIFICANT CHANGE UP (ref 3.8–10.5)

## 2020-11-19 PROCEDURE — 99232 SBSQ HOSP IP/OBS MODERATE 35: CPT

## 2020-11-19 PROCEDURE — 99152 MOD SED SAME PHYS/QHP 5/>YRS: CPT

## 2020-11-19 PROCEDURE — 93460 R&L HRT ART/VENTRICLE ANGIO: CPT | Mod: 26

## 2020-11-19 PROCEDURE — 71045 X-RAY EXAM CHEST 1 VIEW: CPT | Mod: 26

## 2020-11-19 PROCEDURE — 99222 1ST HOSP IP/OBS MODERATE 55: CPT

## 2020-11-19 PROCEDURE — 93010 ELECTROCARDIOGRAM REPORT: CPT | Mod: 59

## 2020-11-19 RX ORDER — SODIUM CHLORIDE 9 MG/ML
3 INJECTION INTRAMUSCULAR; INTRAVENOUS; SUBCUTANEOUS EVERY 8 HOURS
Refills: 0 | Status: DISCONTINUED | OUTPATIENT
Start: 2020-11-19 | End: 2020-11-21

## 2020-11-19 RX ORDER — METOPROLOL TARTRATE 50 MG
12.5 TABLET ORAL DAILY
Refills: 0 | Status: DISCONTINUED | OUTPATIENT
Start: 2020-11-20 | End: 2020-11-21

## 2020-11-19 RX ORDER — ASPIRIN/CALCIUM CARB/MAGNESIUM 324 MG
81 TABLET ORAL DAILY
Refills: 0 | Status: DISCONTINUED | OUTPATIENT
Start: 2020-11-20 | End: 2020-11-21

## 2020-11-19 RX ORDER — AMLODIPINE BESYLATE 2.5 MG/1
10 TABLET ORAL DAILY
Refills: 0 | Status: DISCONTINUED | OUTPATIENT
Start: 2020-11-19 | End: 2020-11-19

## 2020-11-19 RX ORDER — AMLODIPINE BESYLATE 2.5 MG/1
2.5 TABLET ORAL DAILY
Refills: 0 | Status: DISCONTINUED | OUTPATIENT
Start: 2020-11-20 | End: 2020-11-21

## 2020-11-19 RX ORDER — ATORVASTATIN CALCIUM 80 MG/1
20 TABLET, FILM COATED ORAL AT BEDTIME
Refills: 0 | Status: DISCONTINUED | OUTPATIENT
Start: 2020-11-20 | End: 2020-11-21

## 2020-11-19 RX ADMIN — LOSARTAN POTASSIUM 100 MILLIGRAM(S): 100 TABLET, FILM COATED ORAL at 05:26

## 2020-11-19 RX ADMIN — SODIUM CHLORIDE 3 MILLILITER(S): 9 INJECTION INTRAMUSCULAR; INTRAVENOUS; SUBCUTANEOUS at 22:42

## 2020-11-19 RX ADMIN — ENOXAPARIN SODIUM 40 MILLIGRAM(S): 100 INJECTION SUBCUTANEOUS at 12:43

## 2020-11-19 RX ADMIN — AMLODIPINE BESYLATE 10 MILLIGRAM(S): 2.5 TABLET ORAL at 05:27

## 2020-11-19 NOTE — H&P ADULT - NSHPSOCIALHISTORY_GEN_ALL_CORE
Patient states his is , lives at home with wife and family. He works multiple jobs at [employer], , etc.  Patient states occasional/social marijuana use, smokes 3-4 cigarettes a day but quit smoking once he found out he needs surgery. Patient admits to ETOH use (3 beers/day x 4 per week).

## 2020-11-19 NOTE — DISCHARGE NOTE PROVIDER - NSDCMRMEDTOKEN_GEN_ALL_CORE_FT
amLODIPine 10 mg oral tablet: 1 tab(s) orally once a day  cyclobenzaprine 5 mg oral tablet: 1 tab(s) orally 3 times a day, As Needed  losartan 100 mg oral tablet: 1 tab(s) orally once a day

## 2020-11-19 NOTE — DISCHARGE NOTE PROVIDER - NSDCFUSCHEDAPPT_GEN_ALL_CORE_FT
HETAH ORTEZ ; 12/09/2020 ; [facility] [id number]  HEATH ORTEZ ; 12/29/2020 ; [facility] [address]

## 2020-11-19 NOTE — DISCHARGE NOTE PROVIDER - HOSPITAL COURSE
Patient is a 59 yo M with HTN, and recently diagnosed MVP with severe regurgitation p/w dyspnea.      # MVP (mitral valve prolapse)  -Followed by cardiology   - TTE: EF 66%, MVP, severe MR, moderately dilated LA, mild LV enlargement  -plan for possible surgical repair in future  -Carotid dopplers: R internal carotid 1-15% stenosis; LICA:16-49% stenosis  -S/P LHC+RHC: mRCA 60%, EF 60%, RFA and RFV accessed    #Essential hypertension  -continued on amlodipine and losartan.     #Orthostatic dizziness  -pt reported dizziness upon standing suddenly   -Orthostatics negative     #Hepatitis B -reactive     Patient seen and evaluated. Reviewed discharge medications with patient and attending. All new medications requiring new prescriptions were sent to the pharmacy of patient's choice. Reviewed need for prescription for previous home medications and new prescriptions sent if requested. Medically cleared/stable for discharge as per _____  with appropriate follow up. Patient understands and agrees with plan of care.      Patient is a 61 yo M with HTN, and recently diagnosed MVP with severe regurgitation p/w dyspnea.      # MVP (mitral valve prolapse)  -Followed by cardiology   - TTE: EF 66%, MVP, severe MR, moderately dilated LA, mild LV enlargement  -plan for possible surgical repair in future  -Carotid dopplers: R internal carotid 1-15% stenosis; LICA:16-49% stenosis  -S/P LHC+RHC: mRCA 60%, EF 60%, RFA and RFV accessed    #Essential hypertension  -continued on amlodipine and losartan.     #Orthostatic dizziness  -pt reported dizziness upon standing suddenly   -Orthostatics negative     #Hepatitis B -reactive     Patient seen and evaluated. Reviewed discharge medications with patient and attending. All new medications requiring new prescriptions were sent to the pharmacy of patient's choice. Reviewed need for prescription for previous home medications and new prescriptions sent if requested.   Medically cleared/stable for transfer to [facility] Cardiothoracic surgery evaluation for Mitral valve repair.     Patient understands and agrees with plan of care.      Patient is a 61 yo M with HTN, and recently diagnosed MVP with severe regurgitation p/w dyspnea.      # MVP (mitral valve prolapse)  -Followed by cardiology   - TTE: EF 66%, MVP, severe MR, moderately dilated LA, mild LV enlargement  -plan for possible surgical repair in future  -Carotid dopplers: R internal carotid 1-15% stenosis; LICA:16-49% stenosis  -S/P LHC+RHC: mRCA 60%, EF 60%, RFA and RFV accessed    #Essential hypertension  -continued on amlodipine and losartan.     #Orthostatic dizziness  -pt reported dizziness upon standing suddenly   -Orthostatics negative     #Hepatitis B -reactive     Patient seen and evaluated.   Right femoral artery access site  dressing intact, clean / dry, - no hematoma, no bleed, distal pulses intact, Lower extremity warm to touch.    Plan: Transfer to [facility] CTS service  Medically cleared/stable for transfer to [facility] Cardiothoracic surgery evaluation for Mitral valve repair.   Patient understands and agrees with plan of care, informed consent obtained, transfer papers signed.

## 2020-11-19 NOTE — H&P ADULT - NSHPPHYSICALEXAM_GEN_ALL_CORE
General: WN/WD NAD  Neurology: A&Ox3, nonfocal, SAUCEDO x 4  Head:  Normocephalic, atraumatic  ENT:  Mucosa moist, no ulcerations  Neck:  Supple, no sinuses or palpable masses  Lymphatic:  No palpable cervical, supraclavicular, axillary or inguinal adenopathy  Respiratory: CTA B/L  CV: RRR, S1S2, III/IV systolic murmur  Abdominal: Soft, NT, ND no palpable mass  MSK: No edema, + peripheral pulses, FROM all 4 extremity

## 2020-11-19 NOTE — PROGRESS NOTE ADULT - SUBJECTIVE AND OBJECTIVE BOX
Cardiology/Vascular Medicine Inpatient Progress Note    No current cardiac complaints  Denies having CP, SOB, palpitations    Reviewed TEEindings with patient   Will arrange for diagnostic LHC and CTS evaluation today    No significant interval events noted on telemetry -- SR, APCs, PVCs    Vital Signs Last 24 Hrs  T(C): 37.1 (19 Nov 2020 05:25), Max: 37.1 (19 Nov 2020 05:25)  T(F): 98.8 (19 Nov 2020 05:25), Max: 98.8 (19 Nov 2020 05:25)  HR: 70 (19 Nov 2020 05:25) (60 - 82)  BP: 123/72 (19 Nov 2020 05:25) (105/71 - 123/86)  BP(mean): --  RR: 17 (19 Nov 2020 05:25) (17 - 18)  SpO2: 100% (19 Nov 2020 05:25) (99% - 100%)    Appearance: NAD, laying flat in bed  HEENT:   No JVD  Cardiovascular: Normal S1 S2, 3/6 SM  Respiratory: Decreased breath sounds bilateral bases  Psychiatry: Awake, alert  Gastrointestinal:  Soft, Non-tender, + BS	  Extremities: No edema    MEDICATIONS  (STANDING):  amLODIPine   Tablet 10 milliGRAM(s) Oral daily  enoxaparin Injectable 40 milliGRAM(s) SubCutaneous daily  influenza   Vaccine 0.5 milliLiter(s) IntraMuscular once  losartan 100 milliGRAM(s) Oral daily    MEDICATIONS  (PRN):  acetaminophen   Tablet .. 650 milliGRAM(s) Oral every 6 hours PRN Temp greater or equal to 38C (100.4F), Moderate Pain (4 - 6), Severe Pain (7 - 10)  cyclobenzaprine 5 milliGRAM(s) Oral three times a day PRN Muscle Spasm        LABS:	 	                                13.7   7.13  )-----------( 168      ( 19 Nov 2020 06:50 )             41.5   11-19    138  |  105  |  22  ----------------------------<  86  3.8   |  23  |  1.22    Ca    8.7      19 Nov 2020 06:50  Phos  3.2     11-19  Mg     2.0     11-19    TPro  6.2  /  Alb  4.1  /  TBili  0.8  /  DBili  x   /  AST  41<H>  /  ALT  61<H>  /  AlkPhos  39<L>  11-18                                    proBNP: Serum Pro-Brain Natriuretic Peptide: 121.9 pg/mL (11-17-20 @ 00:40)    < from: Xray Chest 1 View- PORTABLE-Urgent (Xray Chest 1 View- PORTABLE-Urgent .) (11.17.20 @ 00:52) >    EXAM:  XR CHEST PORTABLE URGENT 1V        PROCEDURE DATE:  Nov 17 2020         INTERPRETATION:  CLINICAL INDICATION: Shortness of breath    TECHNIQUE: Single view of the chest was obtained.    COMPARISON: Chest radiograph 11/13/2020.    FINDINGS:    Lungs are clear. No pleural effusion or pneumothorax.  Cardiac size is normal. No acute osseous abnormality.    IMPRESSION:  No focal consolidation    PARAMJIT TURNER MD; Resident Radiology  This document has been electronically signed.  CATARINO REHMAN MD; Attending Radiologist  This document has been electronically signed. Nov 17 2020  7:24AM    < end of copied text >	    ec< from: Transthoracic Echocardiogram (11.17.20 @ 05:43) >    Patient name: HEATH ORTEZ  YOB: 1960   Age: 60 (M)   MR#: [id number]  Study Date: 11/17/2020  [facility]  Study quality: Technically good  Referring Physician: Gianluca Bush MD  Blood Pressure: 117/70 mmHg  Height: 175 cm  Weight: 76 kg  BSA: 1.9 m2  ------------------------------------------------------------------------  PROCEDURE: Transthoracic echocardiogram with 2-D, M-Mode  and complete spectral and color flow Doppler.  INDICATION: Nonrheumatic mitral (valve) prolapse (I34.1)  ------------------------------------------------------------------------  DIMENSIONS:  Dimensions:     Normal Values:  LA:     4.9 cm    2.0 - 4.0 cm  Ao:     3.5 cm    2.0 - 3.8 cm  SEPTUM: 0.8 cm    0.6 - 1.2 cm  PWT:    0.8 cm    0.6 - 1.1 cm  LVIDd:  5.9 cm    3.0 - 5.6 cm  LVIDs:  3.7 cm    1.8 - 4.0 cm  Derived Variables:  LVMI: 94 g/m2  RWT: 0.27  Fractional short: 37 %  Ejection Fraction (Teicholtz): 66 %  ------------------------------------------------------------------------  OBSERVATIONS:  Mitral Valve: Mitral valve prolapse involving the posterior  mitral leaflet. Severe mitral regurgitation with an  eccentric, anteriorly directed jet.  Aortic Root: Normal aortic root.  Aortic Valve: Calcified trileaflet aortic valve with normal  opening. Minimal aortic regurgitation.  Left Atrium: Moderately dilated left atrium.  LA volume  index = 48 cc/m2.  Left Ventricle: Normal left ventricular systolic function.  No segmental wall motion abnormalities. Mild left  ventricular enlargement.  Right Heart: Normal right atrium. Normal right ventricular  size and function. Normal tricuspid valve.  Mild tricuspid  regurgitation. Normal pulmonic valve.  Pericardium/PleuraNormal pericardium with no pericardial  effusion.  ------------------------------------------------------------------------  CONCLUSIONS:  1. Mitral valve prolapse involving the posterior mitral  leaflet. Severe mitral regurgitation with an eccentric,  anteriorly directed jet.  2. Moderately dilated left atrium.  LA volume index = 48  cc/m2.  3. Mild left ventricular enlargement.  4. Normal left ventricular systolic function. No segmental  wall motion abnormalities.  5. Normal right ventricular size and function.  Consider PATEL for further evaluation of the mitral valve, if  clinically indicated.  ------------------------------------------------------------------------  Confirmed on  11/17/2020 - 16:12:37 by Anthony Montejo M.D.  ------------------------------------------------------------------------    < end of copied text >    < from: VA Duplex Carotid Arteries, Bilateral. (11.18.20 @ 13:23) >    Patient name: HEATH ORTEZ  Date of test: 11/18/2020  MR#: [id number]  [facility] #: [id number]    Location: [facility] Physician(s): , Gianluca Bush MD  Interpreted by: Gianluca Bush MD, [facility], [facility], [facility], [place]  Tech: Ana Rawls [facility]  Type of Test: Carotid Doppler Evaluation  ------------------------------------------------------------------------  Procedure: Duplex Real-time grayscale/color Doppler  ultrasonography used to interrogate extracranial arteries  bilaterally.  Indications: Occlusion and stenosis of bilateral carotid  arteries (I65.23)  ------------------------------------------------------------------------  VELOCITY:  ------------------------------------------------------------------------  RIGHT:    Subclavian: 44 /    Prox CCA: 80/24    Mid CCA: 106/31    Dist CCA: 67/23    Prox ICA: 72/27    Mid ICA: 57 / 26    Distal ICA: 34 / 16    ECA: 83 / 17    Vertebral: 33 / 11    ICA/CCA: 0.9  ------------------------------------------------------------------------    Subclavian: Normal    CCA Plaque: Minimal    ICA Plaque: Minimal    Vertebral: Antegrade flow  ------------------------------------------------------------------------  LEFT:    Subclavian: 59 /    Prox CCA: 95/34    Mid CCA: 88/28    Dist CCA: 71/26    Prox ICA: 56/25    Mid ICA: 71 / 35    Distal ICA: 33 / 14    ECA: 68 / 20    Vertebral: 47 / 16    ICA/CCA: 0.7  ------------------------------------------------------------------------    Subclavian: Normal    CCA Plaque: Mild    ICA Plaque: Mild    Vertebral: Antegrade flow  ------------------------------------------------------------------------  Right Findings: Right Common Carotid Artery: There is  minimal heterogenous plaque noted throughout the vessel.  Right Internal Carotid Artery:  B-mode and spectral  analyses consistent with a diameter reduction of 1-15%.  There is minimal heterogenous plaque within the proximal  vessel. No hemodynamically significant stenosis.  Right Vertebral Artery:  Antegrade flow with normal  velocities.  Left Findings: Left Common Carotid Artery: There is mild  heterogenous plaque noted throughout the the vessel.  Left Internal Carotid Artery:  B-mode and spectral  analyses consistent with diameter reduction of 16-49%.  There is mild heterogenous plaque within the proximal  vessel. No hemodynamically significant stenosis.  Left Vertebral Artery:  Antegrade flow with normal  velocities.  ------------------------------------------------------------------------  Summary/Impressions:  Right Internal Carotid Artery:  There is minimal  heterogenous plaque within the proximal vessel, consistent  with a 1-15% stenosis.  No hemodynamically significant  stenosis.  Left Internal Carotid Artery:  There is mild heterogenous  plaque within the proximal vessel, consistent with a  16-49% stenosis. No hemodynamically significant stenosis.  ------------------------------------------------------------------------  Confirmed on  11/18/2020 - 6:16 PM by Gianluca Bush MD,  [facility], [facility], [place]  By signing this report, the attending physician certifies  that he or she has personally supervised and interpreted  the vascular study and has reviewed and or edited and  agrees with the written comments contained within the  report.    < end of copied text >
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
## 2020-11-19 NOTE — DISCHARGE NOTE NURSING/CASE MANAGEMENT/SOCIAL WORK - PATIENT PORTAL LINK FT
You can access the FollowMyHealth Patient Portal offered by [facility] by registering at the following website: [email or web address]. By joining [facility]’s FollowMyHealth portal, you will also be able to view your health information using other applications (apps) compatible with our system.

## 2020-11-19 NOTE — H&P ADULT - NSHPLABSRESULTS_GEN_ALL_CORE
< from: Cardiac Cath Lab - Adult (11.19.20 @ 10:03) >    VENTRICLES: Global left ventricular function was normal. EF estimated was  60 %.  VALVES: MITRAL VALVE: The mitral valve exhibited severe regurgitation.  CORONARY VESSELS: The coronary circulation is right dominant.  LM:   --  LM: Normal.  LAD:   --  Proximal LAD: There was a 20 % stenosis.  CX:  --  OM1: Angiography showed moderate atherosclerosis.  RCA:   --  Proximal RCA: There was a 60 % stenosis.  COMPLICATIONS: There were no complications.  DIAGNOSTIC RECOMMENDATIONS: Consultation with a cardiac surgeon will be  obtained for surgical opinion.  Prepared and signed by  Les Buenrostro M.D.    < end of copied text >    < from: Transthoracic Echocardiogram (11.17.20 @ 05:43) >    CONCLUSIONS:  1. Mitral valve prolapse involving the posterior mitral  leaflet. Severe mitral regurgitation with an eccentric,  anteriorly directed jet.  2. Moderately dilated left atrium.  LA volume index = 48  cc/m2.  3. Mild left ventricular enlargement.  4. Normal left ventricular systolic function. No segmental  wall motion abnormalities.  5. Normal right ventricular size and function.  Consider PATEL for further evaluation of the mitral valve, if  clinically indicated.  ------------------------------------------------------------------------  Confirmed on  11/17/2020 - 16:12:37 by Anthony Montejo M.D    < end of copied text >      `

## 2020-11-19 NOTE — H&P ADULT - NSHPREVIEWOFSYSTEMS_GEN_ALL_CORE
REVIEW OF SYSTEMS:  CONSTITUTIONAL: No fever, weight loss, or fatigue  EYES: No eye pain, visual disturbances, or discharge  ENMT:  No difficulty hearing, tinnitus, vertigo; No sinus or throat pain  NECK: No pain or stiffness  RESPIRATORY: Has non productive cough, No wheezing, chills or hemoptysis; shortness of breath  CARDIOVASCULAR: No chest pain, No palpitations, dizziness, or leg swelling  GASTROINTESTINAL: No abdominal or epigastric pain. No nausea, vomiting, or hematemesis; No diarrhea ,has No melena  GENITOURINARY: No dysuria, frequency, hematuria, or incontinence  NEUROLOGICAL: No headaches, memory loss, loss of strength, numbness, or tremors  SKIN: No itching, burning, rashes, or lesions   LYMPH NODES: No enlarged glands  ENDOCRINE: No heat or cold intolerance; No hair loss  MUSCULOSKELETAL: No joint pain or swelling; No muscle, back, or extremity pain  PSYCHIATRIC: No depression, anxiety, mood swings, or difficulty sleeping  HEME/LYMPH: No easy bruising, or bleeding gums  ALLERGY: No hives or eczema
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
## 2020-11-19 NOTE — DISCHARGE NOTE PROVIDER - CARE PROVIDER_API CALL
Gianluca Bush  CARDIOLOGY  [address] [address] [place] [address], [place] [address]  Phone: [phone]  Fax: [phone]  Follow Up Time:

## 2020-11-19 NOTE — DISCHARGE NOTE NURSING/CASE MANAGEMENT/SOCIAL WORK - NSDCPEWEB_GEN_ALL_CORE
[facility] for Tobacco Control website --- [email or web address] --- [email or web address]

## 2020-11-19 NOTE — H&P ADULT - PROBLEM SELECTOR PLAN 1
Preop MVR workup   S/P cath at [facility] - 20% stenosis of pLAD, 60% RCA, normal LM   Plan for OR Saturday?  Started on ASA/Atorv/BB
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
## 2020-11-19 NOTE — DISCHARGE NOTE NURSING/CASE MANAGEMENT/SOCIAL WORK - NSDCPEEMAIL_GEN_ALL_CORE
[facility] for Tobacco Control email [email or web address]

## 2020-11-19 NOTE — DISCHARGE NOTE PROVIDER - NSDCCPCAREPLAN_GEN_ALL_CORE_FT
PRINCIPAL DISCHARGE DIAGNOSIS  Diagnosis: SOB (shortness of breath)  Assessment and Plan of Treatment:       SECONDARY DISCHARGE DIAGNOSES  Diagnosis: Orthostatic dizziness  Assessment and Plan of Treatment: You were complaining of dizziness when standing up, which self resolved .    Diagnosis: Lightheadedness  Assessment and Plan of Treatment:

## 2020-11-20 DIAGNOSIS — I34.0 NONRHEUMATIC MITRAL (VALVE) INSUFFICIENCY: ICD-10-CM

## 2020-11-20 DIAGNOSIS — I25.10 ATHEROSCLEROTIC HEART DISEASE OF NATIVE CORONARY ARTERY WITHOUT ANGINA PECTORIS: ICD-10-CM

## 2020-11-20 LAB
A1C WITH ESTIMATED AVERAGE GLUCOSE RESULT: 5.3 % — SIGNIFICANT CHANGE UP (ref 4–5.6)
ALBUMIN SERPL ELPH-MCNC: 4.4 G/DL — SIGNIFICANT CHANGE UP (ref 3.3–5)
ALP SERPL-CCNC: 43 U/L — SIGNIFICANT CHANGE UP (ref 40–120)
ALT FLD-CCNC: 71 U/L — HIGH (ref 10–45)
ANION GAP SERPL CALC-SCNC: 10 MMOL/L — SIGNIFICANT CHANGE UP (ref 5–17)
ANION GAP SERPL CALC-SCNC: 12 MMOL/L — SIGNIFICANT CHANGE UP (ref 5–17)
APPEARANCE UR: CLEAR — SIGNIFICANT CHANGE UP
APTT BLD: 29.5 SEC — SIGNIFICANT CHANGE UP (ref 27.5–35.5)
AST SERPL-CCNC: 50 U/L — HIGH (ref 10–40)
BILIRUB SERPL-MCNC: 0.9 MG/DL — SIGNIFICANT CHANGE UP (ref 0.2–1.2)
BILIRUB UR-MCNC: NEGATIVE — SIGNIFICANT CHANGE UP
BLD GP AB SCN SERPL QL: NEGATIVE — SIGNIFICANT CHANGE UP
BLD GP AB SCN SERPL QL: NEGATIVE — SIGNIFICANT CHANGE UP
BUN SERPL-MCNC: 17 MG/DL — SIGNIFICANT CHANGE UP (ref 7–23)
BUN SERPL-MCNC: 20 MG/DL — SIGNIFICANT CHANGE UP (ref 7–23)
CALCIUM SERPL-MCNC: 8.8 MG/DL — SIGNIFICANT CHANGE UP (ref 8.4–10.5)
CALCIUM SERPL-MCNC: 9.3 MG/DL — SIGNIFICANT CHANGE UP (ref 8.4–10.5)
CHLORIDE SERPL-SCNC: 106 MMOL/L — SIGNIFICANT CHANGE UP (ref 96–108)
CHLORIDE SERPL-SCNC: 107 MMOL/L — SIGNIFICANT CHANGE UP (ref 96–108)
CO2 SERPL-SCNC: 21 MMOL/L — LOW (ref 22–31)
CO2 SERPL-SCNC: 22 MMOL/L — SIGNIFICANT CHANGE UP (ref 22–31)
COLOR SPEC: YELLOW — SIGNIFICANT CHANGE UP
CREAT SERPL-MCNC: 1.19 MG/DL — SIGNIFICANT CHANGE UP (ref 0.5–1.3)
CREAT SERPL-MCNC: 1.3 MG/DL — SIGNIFICANT CHANGE UP (ref 0.5–1.3)
DIFF PNL FLD: NEGATIVE — SIGNIFICANT CHANGE UP
ESTIMATED AVERAGE GLUCOSE: 105 MG/DL — SIGNIFICANT CHANGE UP (ref 68–114)
FIBRINOGEN PPP-MCNC: 416 MG/DL — SIGNIFICANT CHANGE UP (ref 290–520)
GLUCOSE SERPL-MCNC: 79 MG/DL — SIGNIFICANT CHANGE UP (ref 70–99)
GLUCOSE SERPL-MCNC: 86 MG/DL — SIGNIFICANT CHANGE UP (ref 70–99)
GLUCOSE UR QL: NEGATIVE — SIGNIFICANT CHANGE UP
HCT VFR BLD CALC: 40.4 % — SIGNIFICANT CHANGE UP (ref 39–50)
HGB BLD-MCNC: 13.6 G/DL — SIGNIFICANT CHANGE UP (ref 13–17)
INR BLD: 1.04 RATIO — SIGNIFICANT CHANGE UP (ref 0.88–1.16)
KETONES UR-MCNC: NEGATIVE — SIGNIFICANT CHANGE UP
LEUKOCYTE ESTERASE UR-ACNC: NEGATIVE — SIGNIFICANT CHANGE UP
MCHC RBC-ENTMCNC: 29.4 PG — SIGNIFICANT CHANGE UP (ref 27–34)
MCHC RBC-ENTMCNC: 33.7 GM/DL — SIGNIFICANT CHANGE UP (ref 32–36)
MCV RBC AUTO: 87.4 FL — SIGNIFICANT CHANGE UP (ref 80–100)
MRSA PCR RESULT.: SIGNIFICANT CHANGE UP
NITRITE UR-MCNC: NEGATIVE — SIGNIFICANT CHANGE UP
NRBC # BLD: 0 /100 WBCS — SIGNIFICANT CHANGE UP (ref 0–0)
NT-PROBNP SERPL-SCNC: 114 PG/ML — SIGNIFICANT CHANGE UP (ref 0–300)
PH UR: 6 — SIGNIFICANT CHANGE UP (ref 5–8)
PLATELET # BLD AUTO: 166 K/UL — SIGNIFICANT CHANGE UP (ref 150–400)
POTASSIUM SERPL-MCNC: 4 MMOL/L — SIGNIFICANT CHANGE UP (ref 3.5–5.3)
POTASSIUM SERPL-MCNC: 4.1 MMOL/L — SIGNIFICANT CHANGE UP (ref 3.5–5.3)
POTASSIUM SERPL-SCNC: 4 MMOL/L — SIGNIFICANT CHANGE UP (ref 3.5–5.3)
POTASSIUM SERPL-SCNC: 4.1 MMOL/L — SIGNIFICANT CHANGE UP (ref 3.5–5.3)
PROT SERPL-MCNC: 7 G/DL — SIGNIFICANT CHANGE UP (ref 6–8.3)
PROT UR-MCNC: SIGNIFICANT CHANGE UP
PROTHROM AB SERPL-ACNC: 12.4 SEC — SIGNIFICANT CHANGE UP (ref 10.6–13.6)
RBC # BLD: 4.62 M/UL — SIGNIFICANT CHANGE UP (ref 4.2–5.8)
RBC # FLD: 13.5 % — SIGNIFICANT CHANGE UP (ref 10.3–14.5)
RH IG SCN BLD-IMP: POSITIVE — SIGNIFICANT CHANGE UP
RH IG SCN BLD-IMP: POSITIVE — SIGNIFICANT CHANGE UP
S AUREUS DNA NOSE QL NAA+PROBE: SIGNIFICANT CHANGE UP
SARS-COV-2 RNA SPEC QL NAA+PROBE: SIGNIFICANT CHANGE UP
SODIUM SERPL-SCNC: 139 MMOL/L — SIGNIFICANT CHANGE UP (ref 135–145)
SODIUM SERPL-SCNC: 139 MMOL/L — SIGNIFICANT CHANGE UP (ref 135–145)
SP GR SPEC: 1.04 — HIGH (ref 1.01–1.02)
TSH SERPL-MCNC: 1.44 UIU/ML — SIGNIFICANT CHANGE UP (ref 0.27–4.2)
UROBILINOGEN FLD QL: NEGATIVE — SIGNIFICANT CHANGE UP
WBC # BLD: 8.81 K/UL — SIGNIFICANT CHANGE UP (ref 3.8–10.5)
WBC # FLD AUTO: 8.81 K/UL — SIGNIFICANT CHANGE UP (ref 3.8–10.5)

## 2020-11-20 PROCEDURE — 99232 SBSQ HOSP IP/OBS MODERATE 35: CPT | Mod: 57

## 2020-11-20 RX ADMIN — SODIUM CHLORIDE 3 MILLILITER(S): 9 INJECTION INTRAMUSCULAR; INTRAVENOUS; SUBCUTANEOUS at 22:30

## 2020-11-20 RX ADMIN — AMLODIPINE BESYLATE 2.5 MILLIGRAM(S): 2.5 TABLET ORAL at 05:02

## 2020-11-20 RX ADMIN — Medication 81 MILLIGRAM(S): at 12:19

## 2020-11-20 RX ADMIN — SODIUM CHLORIDE 3 MILLILITER(S): 9 INJECTION INTRAMUSCULAR; INTRAVENOUS; SUBCUTANEOUS at 05:03

## 2020-11-20 RX ADMIN — ATORVASTATIN CALCIUM 20 MILLIGRAM(S): 80 TABLET, FILM COATED ORAL at 22:32

## 2020-11-20 RX ADMIN — Medication 12.5 MILLIGRAM(S): at 05:02

## 2020-11-20 RX ADMIN — SODIUM CHLORIDE 3 MILLILITER(S): 9 INJECTION INTRAMUSCULAR; INTRAVENOUS; SUBCUTANEOUS at 13:00

## 2020-11-20 NOTE — PROGRESS NOTE ADULT - PROBLEM SELECTOR PLAN 1
Preop MVR workup   S/P cath at [facility] - 20% stenosis of pLAD, 60% RCA, normal LM   Plan for OR Saturday?  Started on ASA/Atorv/BB preop workup in progress   continue asa/ statin/ b- blockers  npo after midnight  ck covid  repeat type and screen   [facility] 11/21 with DR. Sarmiento Patient on Home ACEI and Norvasc for BP control   Monitor BP   Continue on Norvasc 5

## 2020-11-20 NOTE — CONSULT NOTE ADULT - SUBJECTIVE AND OBJECTIVE BOX
Cardiovascular Disease Initial Evaluation    CHIEF COMPLAINT: Transfer for mitral valve surgery     HISTORY OF PRESENT ILLNESS:  This is a 60 year old man with HTN and mitral valve prolapse with severe regurgitation who initially presented to [facility] with SOB. He has been having worsening dyspnea on exertion and orthopnea.    Mr. Schaefer underwent PATEL revealing flail posterior mitral leaflets with severe MR. Furthermore, there was moderate CAD on cath.   Currently he denies chest pain or SOB.       Allergies  No Known Allergies        MEDICATIONS:  amLODIPine   Tablet 2.5 milliGRAM(s) Oral daily  aspirin enteric coated 81 milliGRAM(s) Oral daily  metoprolol tartrate 12.5 milliGRAM(s) Oral daily    atorvastatin 20 milliGRAM(s) Oral at bedtime    sodium chloride 0.9% lock flush 3 milliLiter(s) IV Push every 8 hours      PAST MEDICAL & SURGICAL HISTORY:  HTN (hypertension)    MVP (mitral valve prolapse)        FAMILY HISTORY:  FHx: heart disease        SOCIAL HISTORY:    The patient is a nonsmoker       REVIEW OF SYSTEMS:  See HPI, otherwise complete 14 point review of systems negative      PHYSICAL EXAM:  T(C): 36.5 (11-20-20 @ 04:59), Max: 37.1 (11-19-20 @ 21:27)  HR: 73 (11-20-20 @ 04:59) (63 - 73)  BP: 118/79 (11-20-20 @ 04:59) (118/75 - 135/77)  RR: 18 (11-20-20 @ 04:59) (18 - 20)  SpO2: 99% (11-20-20 @ 04:59) (98% - 100%)  Wt(kg): --  I&O's Summary    19 Nov 2020 07:01  -  20 Nov 2020 07:00  --------------------------------------------------------  IN: 300 mL / OUT: 300 mL / NET: 0 mL    20 Nov 2020 07:01  -  20 Nov 2020 11:46  --------------------------------------------------------  IN: 120 mL / OUT: 0 mL / NET: 120 mL        Appearance: No Acute Distress; resting comfortably  HEENT:  Normal oral mucosa, PERRL, EOMI	  Cardiovascular: Normal S1 S2, No JVD, Systolic murmur  Respiratory: Normal respiratory effort; Lungs clear to auscultation bilaterally  Gastrointestinal:  Soft, Non-tender, + BS	  Skin: No rashes, No ecchymoses, No cyanosis	  Neurologic: Non-focal; no weakness  Extremities: No clubbing, cyanosis or edema  Vascular: Peripheral pulses palpable 2+ bilaterally  Psychiatry: A & O x 3, Mood & affect appropriate    Laboratory Data:	 	    CBC Full  -  ( 20 Nov 2020 06:53 )  WBC Count : 8.81 K/uL  Hemoglobin : 13.6 g/dL  Hematocrit : 40.4 %  Platelet Count - Automated : 166 K/uL  Mean Cell Volume : 87.4 fl  Mean Cell Hemoglobin : 29.4 pg  Mean Cell Hemoglobin Concentration : 33.7 gm/dL  Auto Neutrophil # : x  Auto Lymphocyte # : x  Auto Monocyte # : x  Auto Eosinophil # : x  Auto Basophil # : x  Auto Neutrophil % : x  Auto Lymphocyte % : x  Auto Monocyte % : x  Auto Eosinophil % : x  Auto Basophil % : x    11-20    139  |  107  |  17  ----------------------------<  86  4.0   |  22  |  1.19  11-19    139  |  106  |  20  ----------------------------<  79  4.1   |  21<L>  |  1.30    Ca    8.8      20 Nov 2020 06:53  Ca    9.3      19 Nov 2020 23:50  Phos  3.2     11-19  Mg     2.0     11-19    TPro  7.0  /  Alb  4.4  /  TBili  0.9  /  DBili  x   /  AST  50<H>  /  ALT  71<H>  /  AlkPhos  43  11-19      proBNP: Serum Pro-Brain Natriuretic Peptide: 114 pg/mL (11-19 @ 23:50)    Lipid Profile:   HgA1c:   TSH: Thyroid Stimulating Hormone, Serum: 1.44 uIU/mL (11-20 @ 03:31)        Interpretation of Telemetry: Sinus; no ectopy    ECG:  	Sinus      Assessment: 60 year old man with HTN, CAD and mitral valve prolapse with severe regurgitation presents with SOB.    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  Patient is currently not in decompensated CHF.   Plan for CT surgery.    #CAD-  60% RCA lesion.  Possible plan for 1 vessel CABG with MVR.    #HTN-  BP acceptable on current regimen.    #ACP (advance care planning)-  Advanced care planning was discussed with the patient.   Risks, benefits and alternatives of medical treatment and procedures were discussed in detail and all questions were answered. 30 minutes spent addressing advance care plans.      72 minutes spent on total encounter; more than 50% of the visit was spent counseling and/or coordinating care by the attending physician.   	  Luís Gold MD [facility]  Cardiovascular Diseases  [phone]

## 2020-11-20 NOTE — PROGRESS NOTE ADULT - SUBJECTIVE AND OBJECTIVE BOX
VITAL SIGNS    Telemetry:    Vital Signs Last 24 Hrs  T(C): 36.5 (20 @ 04:59), Max: 37.1 (20 @ 21:27)  T(F): 97.7 (20 @ 04:59), Max: 98.7 (20 @ 21:27)  HR: 73 (20 @ 04:59) (63 - 73)  BP: 118/79 (20 @ 04:59) (118/75 - 135/77)  RR: 18 (20 @ 04:59) (18 - 20)  SpO2: 99% (20 @ 04:59) (98% - 100%)             @ 07:01  -   @ 07:00  --------------------------------------------------------  IN: 300 mL / OUT: 300 mL / NET: 0 mL     @ 07:01  -   @ 11:06  --------------------------------------------------------  IN: 120 mL / OUT: 0 mL / NET: 120 mL       Daily Height in cm: 175.26 (2020 08:02)    Daily Weight in k.8 (2020 08:02)  Admit Wt: Drug Dosing Weight  Height (cm): 175.3 (2020 08:02)  Weight (kg): 76.8 (2020 08:02)  BMI (kg/m2): 25 (2020 08:02)  BSA (m2): 1.92 (2020 08:02)    Bilirubin Total, Serum: 0.9 mg/dL ( @ 23:50)    CAPILLARY BLOOD GLUCOSE              amLODIPine   Tablet 2.5 milliGRAM(s) Oral daily  aspirin enteric coated 81 milliGRAM(s) Oral daily  atorvastatin 20 milliGRAM(s) Oral at bedtime  metoprolol tartrate 12.5 milliGRAM(s) Oral daily  sodium chloride 0.9% lock flush 3 milliLiter(s) IV Push every 8 hours      PHYSICAL EXAM    Subjective: "Hi.   Neurology: alert and oriented x 3, nonfocal, no gross deficits  CV : tele:  RSR  Sternal Wound :  CDI with dressing , Stable  Lungs: clear. RR easy, unlabored   Abdomen: soft, nontender, nondistended, positive bowel sounds, bowel movement   Neg N/V/D   :  pt voiding without difficulty   Extremities:   SAUCEDO; edema, neg calf tenderness.   PPP bilaterally      PW:  Chest tubes:                 VITAL SIGNS    Telemetry:  rsr 60-80   Vital Signs Last 24 Hrs  T(C): 36.5 (20 @ 04:59), Max: 37.1 (20 @ 21:27)  T(F): 97.7 (20 @ 04:59), Max: 98.7 (20 @ 21:27)  HR: 73 (20 @ 04:59) (63 - 73)  BP: 118/79 (20 @ 04:59) (118/75 - 135/77)  RR: 18 (20 @ 04:59) (18 - 20)  SpO2: 99% (20 @ 04:59) (98% - 100%)             @ 07:01  -   @ 07:00  --------------------------------------------------------  IN: 300 mL / OUT: 300 mL / NET: 0 mL     @ 07:01  -   @ 11:06  --------------------------------------------------------  IN: 120 mL / OUT: 0 mL / NET: 120 mL       Daily Height in cm: 175.26 (2020 08:02)    Daily Weight in k.8 (2020 08:02)  Admit Wt: Drug Dosing Weight  Height (cm): 175.3 (2020 08:02)  Weight (kg): 76.8 (2020 08:02)  BMI (kg/m2): 25 (2020 08:02)  BSA (m2): 1.92 (2020 08:02)    Bilirubin Total, Serum: 0.9 mg/dL ( @ 23:50)      amLODIPine   Tablet 2.5 milliGRAM(s) Oral daily  aspirin enteric coated 81 milliGRAM(s) Oral daily  atorvastatin 20 milliGRAM(s) Oral at bedtime  metoprolol tartrate 12.5 milliGRAM(s) Oral daily  sodium chloride 0.9% lock flush 3 milliLiter(s) IV Push every 8 hours      PHYSICAL EXAM    Subjective: "My surgery is tomorrow."   Neurology: alert and oriented x 3, nonfocal, no gross deficits  CV : tele:  RSR 60-80; + murmur   Lungs: clear. RR easy, unlabored   Abdomen: soft, nontender, nondistended, positive bowel sounds, + bowel movement   Neg N/V/D   :  pt voiding without difficulty   Extremities:   SAUCEDO; neg LE edema, neg calf tenderness.   PPP bilaterally; rt groin cath site cdi dieudonne- neg hematoma

## 2020-11-20 NOTE — PROGRESS NOTE ADULT - PROBLEM SELECTOR PLAN 2
Patient on Home ACEI and Norvasc for BP control   Monitor BP   Continue on Norvasc 5 preop workup in progress   continue asa/ statin/ b- blockers  npo after midnight  ck covid  repeat type and screen   [facility] 11/21 with DR. Sarmiento
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
## 2020-11-20 NOTE — PROGRESS NOTE ADULT - PROBLEM SELECTOR PLAN 3
preop workup in progress   continue asa/ statin/ b- blockers  npo after midnight  ck covid  repeat type and screen   [facility] 11/21 with DR. Sarmiento
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
## 2020-11-21 ENCOUNTER — RESULT REVIEW (OUTPATIENT)
Age: 60
End: 2020-11-21

## 2020-11-21 ENCOUNTER — APPOINTMENT (OUTPATIENT)
Dept: CARDIOTHORACIC SURGERY | Facility: HOSPITAL | Age: 60
End: 2020-11-21

## 2020-11-21 LAB
ALBUMIN SERPL ELPH-MCNC: 3.5 G/DL — SIGNIFICANT CHANGE UP (ref 3.3–5)
ALP SERPL-CCNC: 29 U/L — LOW (ref 40–120)
ALT FLD-CCNC: 60 U/L — HIGH (ref 10–45)
ANION GAP SERPL CALC-SCNC: 10 MMOL/L — SIGNIFICANT CHANGE UP (ref 5–17)
APTT BLD: 28.8 SEC — SIGNIFICANT CHANGE UP (ref 27.5–35.5)
AST SERPL-CCNC: 76 U/L — HIGH (ref 10–40)
BASE EXCESS BLDMV CALC-SCNC: -0.2 MMOL/L — SIGNIFICANT CHANGE UP (ref -3–3)
BASE EXCESS BLDMV CALC-SCNC: -0.5 MMOL/L — SIGNIFICANT CHANGE UP (ref -3–3)
BASE EXCESS BLDMV CALC-SCNC: 0.1 MMOL/L — SIGNIFICANT CHANGE UP (ref -3–3)
BASE EXCESS BLDMV CALC-SCNC: 0.4 MMOL/L — SIGNIFICANT CHANGE UP (ref -3–3)
BASE EXCESS BLDV CALC-SCNC: -0.5 MMOL/L — SIGNIFICANT CHANGE UP (ref -2–2)
BASE EXCESS BLDV CALC-SCNC: 0.9 MMOL/L — SIGNIFICANT CHANGE UP (ref -2–2)
BASE EXCESS BLDV CALC-SCNC: 1 MMOL/L — SIGNIFICANT CHANGE UP (ref -2–2)
BASE EXCESS BLDV CALC-SCNC: 1.4 MMOL/L — SIGNIFICANT CHANGE UP (ref -2–2)
BILIRUB SERPL-MCNC: 1.2 MG/DL — SIGNIFICANT CHANGE UP (ref 0.2–1.2)
BUN SERPL-MCNC: 14 MG/DL — SIGNIFICANT CHANGE UP (ref 7–23)
CA-I SERPL-SCNC: 1.03 MMOL/L — LOW (ref 1.12–1.3)
CA-I SERPL-SCNC: 1.06 MMOL/L — LOW (ref 1.12–1.3)
CA-I SERPL-SCNC: 1.06 MMOL/L — LOW (ref 1.12–1.3)
CA-I SERPL-SCNC: 1.38 MMOL/L — HIGH (ref 1.12–1.3)
CALCIUM SERPL-MCNC: 9.5 MG/DL — SIGNIFICANT CHANGE UP (ref 8.4–10.5)
CHLORIDE BLDV-SCNC: 104 MMOL/L — SIGNIFICANT CHANGE UP (ref 96–108)
CHLORIDE BLDV-SCNC: 105 MMOL/L — SIGNIFICANT CHANGE UP (ref 96–108)
CHLORIDE BLDV-SCNC: 105 MMOL/L — SIGNIFICANT CHANGE UP (ref 96–108)
CHLORIDE BLDV-SCNC: 106 MMOL/L — SIGNIFICANT CHANGE UP (ref 96–108)
CHLORIDE SERPL-SCNC: 106 MMOL/L — SIGNIFICANT CHANGE UP (ref 96–108)
CK MB BLD-MCNC: 11.3 % — HIGH (ref 0–3.5)
CK MB CFR SERPL CALC: 63.2 NG/ML — HIGH (ref 0–6.7)
CK SERPL-CCNC: 557 U/L — HIGH (ref 30–200)
CO2 BLDMV-SCNC: 25 MMOL/L — SIGNIFICANT CHANGE UP (ref 21–29)
CO2 BLDMV-SCNC: 25 MMOL/L — SIGNIFICANT CHANGE UP (ref 21–29)
CO2 BLDMV-SCNC: 26 MMOL/L — SIGNIFICANT CHANGE UP (ref 21–29)
CO2 BLDMV-SCNC: 26 MMOL/L — SIGNIFICANT CHANGE UP (ref 21–29)
CO2 BLDV-SCNC: 26 MMOL/L — SIGNIFICANT CHANGE UP (ref 22–30)
CO2 BLDV-SCNC: 27 MMOL/L — SIGNIFICANT CHANGE UP (ref 22–30)
CO2 BLDV-SCNC: 27 MMOL/L — SIGNIFICANT CHANGE UP (ref 22–30)
CO2 BLDV-SCNC: 28 MMOL/L — SIGNIFICANT CHANGE UP (ref 22–30)
CO2 SERPL-SCNC: 20 MMOL/L — LOW (ref 22–31)
CREAT SERPL-MCNC: 1.16 MG/DL — SIGNIFICANT CHANGE UP (ref 0.5–1.3)
FIBRINOGEN PPP-MCNC: 282 MG/DL — LOW (ref 290–520)
GAS PNL BLDA: SIGNIFICANT CHANGE UP
GAS PNL BLDMV: SIGNIFICANT CHANGE UP
GAS PNL BLDV: 133 MMOL/L — LOW (ref 135–145)
GAS PNL BLDV: 136 MMOL/L — SIGNIFICANT CHANGE UP (ref 135–145)
GAS PNL BLDV: SIGNIFICANT CHANGE UP
GLUCOSE BLDV-MCNC: 103 MG/DL — HIGH (ref 70–99)
GLUCOSE BLDV-MCNC: 104 MG/DL — HIGH (ref 70–99)
GLUCOSE BLDV-MCNC: 104 MG/DL — HIGH (ref 70–99)
GLUCOSE BLDV-MCNC: 106 MG/DL — HIGH (ref 70–99)
GLUCOSE SERPL-MCNC: 134 MG/DL — HIGH (ref 70–99)
HCO3 BLDMV-SCNC: 24 MMOL/L — SIGNIFICANT CHANGE UP (ref 20–28)
HCO3 BLDMV-SCNC: 25 MMOL/L — SIGNIFICANT CHANGE UP (ref 20–28)
HCO3 BLDV-SCNC: 25 MMOL/L — SIGNIFICANT CHANGE UP (ref 21–29)
HCO3 BLDV-SCNC: 25 MMOL/L — SIGNIFICANT CHANGE UP (ref 21–29)
HCO3 BLDV-SCNC: 26 MMOL/L — SIGNIFICANT CHANGE UP (ref 21–29)
HCO3 BLDV-SCNC: 26 MMOL/L — SIGNIFICANT CHANGE UP (ref 21–29)
HCT VFR BLD CALC: 35.8 % — LOW (ref 39–50)
HCT VFR BLDA CALC: 32 % — LOW (ref 39–50)
HCT VFR BLDA CALC: 33 % — LOW (ref 39–50)
HCT VFR BLDA CALC: 33 % — LOW (ref 39–50)
HCT VFR BLDA CALC: 34 % — LOW (ref 39–50)
HCV RNA FLD QL NAA+PROBE: POSITIVE — HIGH
HEPARINASE TEG R TIME: 6.4 MIN — SIGNIFICANT CHANGE UP (ref 4.3–8.3)
HGB BLD CALC-MCNC: 10.4 G/DL — LOW (ref 13–17)
HGB BLD CALC-MCNC: 10.7 G/DL — LOW (ref 13–17)
HGB BLD CALC-MCNC: 10.7 G/DL — LOW (ref 13–17)
HGB BLD CALC-MCNC: 11 G/DL — LOW (ref 13–17)
HGB BLD-MCNC: 12.3 G/DL — LOW (ref 13–17)
HOROWITZ INDEX BLDMV+IHG-RTO: 100 — SIGNIFICANT CHANGE UP
HOROWITZ INDEX BLDMV+IHG-RTO: 40 — SIGNIFICANT CHANGE UP
HOROWITZ INDEX BLDMV+IHG-RTO: 40 — SIGNIFICANT CHANGE UP
HOROWITZ INDEX BLDMV+IHG-RTO: 60 — SIGNIFICANT CHANGE UP
INR BLD: 1.26 RATIO — HIGH (ref 0.88–1.16)
LACTATE BLDV-MCNC: 0.8 MMOL/L — SIGNIFICANT CHANGE UP (ref 0.7–2)
LACTATE BLDV-MCNC: 0.8 MMOL/L — SIGNIFICANT CHANGE UP (ref 0.7–2)
LACTATE BLDV-MCNC: 1 MMOL/L — SIGNIFICANT CHANGE UP (ref 0.7–2)
LACTATE BLDV-MCNC: 1.3 MMOL/L — SIGNIFICANT CHANGE UP (ref 0.7–2)
MAGNESIUM SERPL-MCNC: 2.6 MG/DL — SIGNIFICANT CHANGE UP (ref 1.6–2.6)
MCHC RBC-ENTMCNC: 29.7 PG — SIGNIFICANT CHANGE UP (ref 27–34)
MCHC RBC-ENTMCNC: 34.4 GM/DL — SIGNIFICANT CHANGE UP (ref 32–36)
MCV RBC AUTO: 86.5 FL — SIGNIFICANT CHANGE UP (ref 80–100)
NRBC # BLD: 0 /100 WBCS — SIGNIFICANT CHANGE UP (ref 0–0)
O2 CT VFR BLD CALC: 36 MMHG — SIGNIFICANT CHANGE UP (ref 30–65)
O2 CT VFR BLD CALC: 39 MMHG — SIGNIFICANT CHANGE UP (ref 30–65)
O2 CT VFR BLD CALC: 43 MMHG — SIGNIFICANT CHANGE UP (ref 30–65)
O2 CT VFR BLD CALC: 48 MMHG — SIGNIFICANT CHANGE UP (ref 30–65)
OTHER CELLS CSF MANUAL: 13 ML/DL — LOW (ref 18–22)
OTHER CELLS CSF MANUAL: 14 ML/DL — LOW (ref 18–22)
OTHER CELLS CSF MANUAL: 15 ML/DL — LOW (ref 18–22)
OTHER CELLS CSF MANUAL: 15 ML/DL — LOW (ref 18–22)
PCO2 BLDMV: 38 MMHG — SIGNIFICANT CHANGE UP (ref 30–65)
PCO2 BLDMV: 41 MMHG — SIGNIFICANT CHANGE UP (ref 30–65)
PCO2 BLDMV: 42 MMHG — SIGNIFICANT CHANGE UP (ref 30–65)
PCO2 BLDMV: 42 MMHG — SIGNIFICANT CHANGE UP (ref 30–65)
PCO2 BLDV: 36 MMHG — SIGNIFICANT CHANGE UP (ref 35–50)
PCO2 BLDV: 43 MMHG — SIGNIFICANT CHANGE UP (ref 35–50)
PCO2 BLDV: 44 MMHG — SIGNIFICANT CHANGE UP (ref 35–50)
PCO2 BLDV: 58 MMHG — HIGH (ref 35–50)
PH BLDMV: 7.38 — SIGNIFICANT CHANGE UP (ref 7.32–7.45)
PH BLDMV: 7.38 — SIGNIFICANT CHANGE UP (ref 7.32–7.45)
PH BLDMV: 7.39 — SIGNIFICANT CHANGE UP (ref 7.32–7.45)
PH BLDMV: 7.42 — SIGNIFICANT CHANGE UP (ref 7.32–7.45)
PH BLDV: 7.28 — LOW (ref 7.35–7.45)
PH BLDV: 7.38 — SIGNIFICANT CHANGE UP (ref 7.35–7.45)
PH BLDV: 7.39 — SIGNIFICANT CHANGE UP (ref 7.35–7.45)
PH BLDV: 7.45 — SIGNIFICANT CHANGE UP (ref 7.35–7.45)
PHOSPHATE SERPL-MCNC: 2.9 MG/DL — SIGNIFICANT CHANGE UP (ref 2.5–4.5)
PLATELET # BLD AUTO: 106 K/UL — LOW (ref 150–400)
PO2 BLDV: 104 MMHG — HIGH (ref 25–45)
PO2 BLDV: 105 MMHG — HIGH (ref 25–45)
PO2 BLDV: 60 MMHG — HIGH (ref 25–45)
PO2 BLDV: 88 MMHG — HIGH (ref 25–45)
POTASSIUM BLDV-SCNC: 4.6 MMOL/L — SIGNIFICANT CHANGE UP (ref 3.5–5.3)
POTASSIUM BLDV-SCNC: 5.1 MMOL/L — SIGNIFICANT CHANGE UP (ref 3.5–5.3)
POTASSIUM BLDV-SCNC: 5.1 MMOL/L — SIGNIFICANT CHANGE UP (ref 3.5–5.3)
POTASSIUM BLDV-SCNC: 5.5 MMOL/L — HIGH (ref 3.5–5.3)
POTASSIUM SERPL-MCNC: 4.9 MMOL/L — SIGNIFICANT CHANGE UP (ref 3.5–5.3)
POTASSIUM SERPL-SCNC: 4.9 MMOL/L — SIGNIFICANT CHANGE UP (ref 3.5–5.3)
PROT SERPL-MCNC: 5 G/DL — LOW (ref 6–8.3)
PROTHROM AB SERPL-ACNC: 14.9 SEC — HIGH (ref 10.6–13.6)
RAPIDTEG MAXIMUM AMPLITUDE: 53.3 MM — SIGNIFICANT CHANGE UP (ref 52–70)
RBC # BLD: 4.14 M/UL — LOW (ref 4.2–5.8)
RBC # FLD: 13 % — SIGNIFICANT CHANGE UP (ref 10.3–14.5)
SAO2 % BLDMV: 63 % — SIGNIFICANT CHANGE UP (ref 60–90)
SAO2 % BLDMV: 67 % — SIGNIFICANT CHANGE UP (ref 60–90)
SAO2 % BLDMV: 72 % — SIGNIFICANT CHANGE UP (ref 60–90)
SAO2 % BLDMV: 79 % — SIGNIFICANT CHANGE UP (ref 60–90)
SAO2 % BLDV: 90 % — HIGH (ref 67–88)
SAO2 % BLDV: 95 % — HIGH (ref 67–88)
SAO2 % BLDV: 98 % — HIGH (ref 67–88)
SAO2 % BLDV: 98 % — HIGH (ref 67–88)
SODIUM SERPL-SCNC: 136 MMOL/L — SIGNIFICANT CHANGE UP (ref 135–145)
TEG FUNCTIONAL FIBRINOGEN: 16.4 MM — SIGNIFICANT CHANGE UP (ref 15–32)
TEG MAXIMUM AMPLITUDE: 57.9 MM — SIGNIFICANT CHANGE UP (ref 52–69)
TEG REACTION TIME: 6.7 MIN — SIGNIFICANT CHANGE UP (ref 4.6–9.1)
TROPONIN T, HIGH SENSITIVITY RESULT: 713 NG/L — HIGH (ref 0–51)
WBC # BLD: 17.47 K/UL — HIGH (ref 3.8–10.5)
WBC # FLD AUTO: 17.47 K/UL — HIGH (ref 3.8–10.5)

## 2020-11-21 PROCEDURE — 99292 CRITICAL CARE ADDL 30 MIN: CPT

## 2020-11-21 PROCEDURE — 93010 ELECTROCARDIOGRAM REPORT: CPT

## 2020-11-21 PROCEDURE — 33510 CABG VEIN SINGLE: CPT

## 2020-11-21 PROCEDURE — 71045 X-RAY EXAM CHEST 1 VIEW: CPT | Mod: 26

## 2020-11-21 PROCEDURE — 88305 TISSUE EXAM BY PATHOLOGIST: CPT | Mod: 26

## 2020-11-21 PROCEDURE — 33427 REPAIR OF MITRAL VALVE: CPT

## 2020-11-21 PROCEDURE — 33508 ENDOSCOPIC VEIN HARVEST: CPT

## 2020-11-21 PROCEDURE — 99291 CRITICAL CARE FIRST HOUR: CPT

## 2020-11-21 RX ORDER — VASOPRESSIN 20 [USP'U]/ML
0 INJECTION INTRAVENOUS
Qty: 50 | Refills: 0 | Status: DISCONTINUED | OUTPATIENT
Start: 2020-11-21 | End: 2020-11-21

## 2020-11-21 RX ORDER — METOCLOPRAMIDE HCL 10 MG
10 TABLET ORAL EVERY 8 HOURS
Refills: 0 | Status: COMPLETED | OUTPATIENT
Start: 2020-11-21 | End: 2020-11-23

## 2020-11-21 RX ORDER — METOCLOPRAMIDE HCL 10 MG
10 TABLET ORAL EVERY 8 HOURS
Refills: 0 | Status: DISCONTINUED | OUTPATIENT
Start: 2020-11-21 | End: 2020-11-21

## 2020-11-21 RX ORDER — HYDROMORPHONE HYDROCHLORIDE 2 MG/ML
0.5 INJECTION INTRAMUSCULAR; INTRAVENOUS; SUBCUTANEOUS ONCE
Refills: 0 | Status: DISCONTINUED | OUTPATIENT
Start: 2020-11-21 | End: 2020-11-21

## 2020-11-21 RX ORDER — NOREPINEPHRINE BITARTRATE/D5W 8 MG/250ML
0.05 PLASTIC BAG, INJECTION (ML) INTRAVENOUS
Qty: 8 | Refills: 0 | Status: DISCONTINUED | OUTPATIENT
Start: 2020-11-21 | End: 2020-11-21

## 2020-11-21 RX ORDER — PANTOPRAZOLE SODIUM 20 MG/1
40 TABLET, DELAYED RELEASE ORAL DAILY
Refills: 0 | Status: DISCONTINUED | OUTPATIENT
Start: 2020-11-21 | End: 2020-11-22

## 2020-11-21 RX ORDER — DEXTROSE 50 % IN WATER 50 %
25 SYRINGE (ML) INTRAVENOUS
Refills: 0 | Status: DISCONTINUED | OUTPATIENT
Start: 2020-11-21 | End: 2020-11-21

## 2020-11-21 RX ORDER — ONDANSETRON 8 MG/1
4 TABLET, FILM COATED ORAL EVERY 6 HOURS
Refills: 0 | Status: DISCONTINUED | OUTPATIENT
Start: 2020-11-21 | End: 2020-11-24

## 2020-11-21 RX ORDER — PROPOFOL 10 MG/ML
20 INJECTION, EMULSION INTRAVENOUS
Qty: 500 | Refills: 0 | Status: DISCONTINUED | OUTPATIENT
Start: 2020-11-21 | End: 2020-11-21

## 2020-11-21 RX ORDER — NICARDIPINE HYDROCHLORIDE 30 MG/1
5 CAPSULE, EXTENDED RELEASE ORAL
Qty: 40 | Refills: 0 | Status: DISCONTINUED | OUTPATIENT
Start: 2020-11-21 | End: 2020-11-21

## 2020-11-21 RX ORDER — DEXTROSE 50 % IN WATER 50 %
50 SYRINGE (ML) INTRAVENOUS
Refills: 0 | Status: DISCONTINUED | OUTPATIENT
Start: 2020-11-21 | End: 2020-11-21

## 2020-11-21 RX ORDER — SODIUM CHLORIDE 9 MG/ML
1000 INJECTION INTRAMUSCULAR; INTRAVENOUS; SUBCUTANEOUS
Refills: 0 | Status: DISCONTINUED | OUTPATIENT
Start: 2020-11-21 | End: 2020-11-23

## 2020-11-21 RX ORDER — NALOXONE HYDROCHLORIDE 4 MG/.1ML
0.1 SPRAY NASAL
Refills: 0 | Status: DISCONTINUED | OUTPATIENT
Start: 2020-11-21 | End: 2020-11-24

## 2020-11-21 RX ORDER — DEXMEDETOMIDINE HYDROCHLORIDE IN 0.9% SODIUM CHLORIDE 4 UG/ML
0.2 INJECTION INTRAVENOUS
Qty: 200 | Refills: 0 | Status: DISCONTINUED | OUTPATIENT
Start: 2020-11-21 | End: 2020-11-22

## 2020-11-21 RX ORDER — SODIUM CHLORIDE 9 MG/ML
500 INJECTION, SOLUTION INTRAVENOUS ONCE
Refills: 0 | Status: COMPLETED | OUTPATIENT
Start: 2020-11-21 | End: 2020-11-21

## 2020-11-21 RX ORDER — NOREPINEPHRINE BITARTRATE/D5W 8 MG/250ML
0.05 PLASTIC BAG, INJECTION (ML) INTRAVENOUS
Qty: 8 | Refills: 0 | Status: DISCONTINUED | OUTPATIENT
Start: 2020-11-21 | End: 2020-11-22

## 2020-11-21 RX ORDER — CEFUROXIME AXETIL 250 MG
1500 TABLET ORAL EVERY 8 HOURS
Refills: 0 | Status: DISCONTINUED | OUTPATIENT
Start: 2020-11-21 | End: 2020-11-23

## 2020-11-21 RX ORDER — VASOPRESSIN 20 [USP'U]/ML
0 INJECTION INTRAVENOUS
Qty: 50 | Refills: 0 | Status: DISCONTINUED | OUTPATIENT
Start: 2020-11-21 | End: 2020-11-22

## 2020-11-21 RX ORDER — ACETAMINOPHEN 500 MG
1000 TABLET ORAL ONCE
Refills: 0 | Status: COMPLETED | OUTPATIENT
Start: 2020-11-21 | End: 2020-11-21

## 2020-11-21 RX ORDER — CHLORHEXIDINE GLUCONATE 213 G/1000ML
15 SOLUTION TOPICAL EVERY 12 HOURS
Refills: 0 | Status: DISCONTINUED | OUTPATIENT
Start: 2020-11-21 | End: 2020-11-21

## 2020-11-21 RX ORDER — ASPIRIN/CALCIUM CARB/MAGNESIUM 324 MG
300 TABLET ORAL ONCE
Refills: 0 | Status: COMPLETED | OUTPATIENT
Start: 2020-11-21 | End: 2020-11-21

## 2020-11-21 RX ORDER — HYDROMORPHONE HYDROCHLORIDE 2 MG/ML
0.5 INJECTION INTRAMUSCULAR; INTRAVENOUS; SUBCUTANEOUS ONCE
Refills: 0 | Status: DISCONTINUED | OUTPATIENT
Start: 2020-11-21 | End: 2020-11-22

## 2020-11-21 RX ORDER — DOBUTAMINE HCL 250MG/20ML
2.5 VIAL (ML) INTRAVENOUS
Qty: 500 | Refills: 0 | Status: DISCONTINUED | OUTPATIENT
Start: 2020-11-21 | End: 2020-11-21

## 2020-11-21 RX ORDER — HYDROMORPHONE HYDROCHLORIDE 2 MG/ML
30 INJECTION INTRAMUSCULAR; INTRAVENOUS; SUBCUTANEOUS
Refills: 0 | Status: DISCONTINUED | OUTPATIENT
Start: 2020-11-21 | End: 2020-11-24

## 2020-11-21 RX ORDER — HYDROMORPHONE HYDROCHLORIDE 2 MG/ML
0.5 INJECTION INTRAMUSCULAR; INTRAVENOUS; SUBCUTANEOUS
Refills: 0 | Status: DISCONTINUED | OUTPATIENT
Start: 2020-11-21 | End: 2020-11-24

## 2020-11-21 RX ORDER — CHLORHEXIDINE GLUCONATE 213 G/1000ML
5 SOLUTION TOPICAL
Refills: 0 | Status: DISCONTINUED | OUTPATIENT
Start: 2020-11-21 | End: 2020-11-21

## 2020-11-21 RX ORDER — ASPIRIN/CALCIUM CARB/MAGNESIUM 324 MG
300 TABLET ORAL ONCE
Refills: 0 | Status: COMPLETED | OUTPATIENT
Start: 2020-11-21 | End: 2021-10-20

## 2020-11-21 RX ADMIN — Medication 400 MILLIGRAM(S): at 19:57

## 2020-11-21 RX ADMIN — SODIUM CHLORIDE 3 MILLILITER(S): 9 INJECTION INTRAMUSCULAR; INTRAVENOUS; SUBCUTANEOUS at 06:25

## 2020-11-21 RX ADMIN — HYDROMORPHONE HYDROCHLORIDE 0.5 MILLIGRAM(S): 2 INJECTION INTRAMUSCULAR; INTRAVENOUS; SUBCUTANEOUS at 14:00

## 2020-11-21 RX ADMIN — HYDROMORPHONE HYDROCHLORIDE 0.5 MILLIGRAM(S): 2 INJECTION INTRAMUSCULAR; INTRAVENOUS; SUBCUTANEOUS at 16:00

## 2020-11-21 RX ADMIN — Medication 10 MILLIGRAM(S): at 14:23

## 2020-11-21 RX ADMIN — Medication 7.2 MICROGRAM(S)/KG/MIN: at 19:57

## 2020-11-21 RX ADMIN — SODIUM CHLORIDE 2000 MILLILITER(S): 9 INJECTION, SOLUTION INTRAVENOUS at 16:30

## 2020-11-21 RX ADMIN — Medication 100 MILLIGRAM(S): at 23:58

## 2020-11-21 RX ADMIN — HYDROMORPHONE HYDROCHLORIDE 0.5 MILLIGRAM(S): 2 INJECTION INTRAMUSCULAR; INTRAVENOUS; SUBCUTANEOUS at 16:15

## 2020-11-21 RX ADMIN — VASOPRESSIN 0.1 UNIT(S)/MIN: 20 INJECTION INTRAVENOUS at 19:57

## 2020-11-21 RX ADMIN — Medication 300 MILLIGRAM(S): at 19:48

## 2020-11-21 RX ADMIN — Medication 1000 MILLIGRAM(S): at 20:12

## 2020-11-21 RX ADMIN — Medication 10 MILLIGRAM(S): at 21:34

## 2020-11-21 RX ADMIN — HYDROMORPHONE HYDROCHLORIDE 0.5 MILLIGRAM(S): 2 INJECTION INTRAMUSCULAR; INTRAVENOUS; SUBCUTANEOUS at 13:45

## 2020-11-21 RX ADMIN — SODIUM CHLORIDE 2000 MILLILITER(S): 9 INJECTION, SOLUTION INTRAVENOUS at 15:01

## 2020-11-21 RX ADMIN — CHLORHEXIDINE GLUCONATE 15 MILLILITER(S): 213 SOLUTION TOPICAL at 17:28

## 2020-11-21 RX ADMIN — DEXMEDETOMIDINE HYDROCHLORIDE IN 0.9% SODIUM CHLORIDE 3.84 MICROGRAM(S)/KG/HR: 4 INJECTION INTRAVENOUS at 19:56

## 2020-11-21 RX ADMIN — AMLODIPINE BESYLATE 2.5 MILLIGRAM(S): 2.5 TABLET ORAL at 06:23

## 2020-11-21 RX ADMIN — SODIUM CHLORIDE 2000 MILLILITER(S): 9 INJECTION, SOLUTION INTRAVENOUS at 13:30

## 2020-11-21 RX ADMIN — Medication 100 MILLIGRAM(S): at 15:57

## 2020-11-21 RX ADMIN — Medication 12.5 MILLIGRAM(S): at 06:23

## 2020-11-21 RX ADMIN — SODIUM CHLORIDE 1000 MILLILITER(S): 9 INJECTION, SOLUTION INTRAVENOUS at 20:28

## 2020-11-21 NOTE — AIRWAY REMOVAL NOTE  ADULT & PEDS - ARTIFICAL AIRWAY REMOVAL COMMENTS
Written order for extubation verified. The patient was identified by full name and birth date compared to the identification band. Present during the procedure was Meghann Valencia RN.

## 2020-11-21 NOTE — BRIEF OPERATIVE NOTE - NSICDXBRIEFPREOP_GEN_ALL_CORE_FT
PRE-OP DIAGNOSIS:  CAD in native artery 21-Nov-2020 12:27:35  Brian Barlow  Mitral regurgitation 21-Nov-2020 12:27:25  Brian Barlow

## 2020-11-21 NOTE — PROGRESS NOTE ADULT - SUBJECTIVE AND OBJECTIVE BOX
CRITICAL CARE ATTENDING - CTICU    MEDICATIONS  (STANDING):  aspirin Suppository 300 milliGRAM(s) Rectal once  cefuroxime  IVPB 1500 milliGRAM(s) IV Intermittent every 8 hours  chlorhexidine 0.12% Liquid 15 milliLiter(s) Oral Mucosa every 12 hours  chlorhexidine 0.12% Liquid 5 milliLiter(s) Oral Mucosa two times a day  dextrose 50% Injectable 50 milliLiter(s) IV Push every 15 minutes  dextrose 50% Injectable 25 milliLiter(s) IV Push every 15 minutes  metoclopramide  IVPB 10 milliGRAM(s) IV Intermittent every 8 hours  pantoprazole  Injectable 40 milliGRAM(s) IV Push daily  sodium chloride 0.9%. 1000 milliLiter(s) (10 mL/Hr) IV Continuous <Continuous>  vasopressin Infusion 0.002 Unit(s)/Min (0.1 mL/Hr) IV Continuous <Continuous>                                    13.6   8.81  )-----------( 166      ( 20 Nov 2020 06:53 )             40.4       11-20    139  |  107  |  17  ----------------------------<  86  4.0   |  22  |  1.19    Ca    8.8      20 Nov 2020 06:53    TPro  7.0  /  Alb  4.4  /  TBili  0.9  /  DBili  x   /  AST  50<H>  /  ALT  71<H>  /  AlkPhos  43  11-19      PT/INR - ( 19 Nov 2020 23:50 )   PT: 12.4 sec;   INR: 1.04 ratio         PTT - ( 19 Nov 2020 23:50 )  PTT:29.5 sec        Daily Height in cm: 175.26 (21 Nov 2020 05:41)    Daily       11-20 @ 07:01  -  11-21 @ 07:00  --------------------------------------------------------  IN: 240 mL / OUT: 0 mL / NET: 240 mL        Critically Ill patient  : [ ] preoperative ,   [x ] post operative    Requires :  [ x] Arterial Line   [ x] Central Line  [x ] PA catheter  [ ] IABP  [ ] ECMO  [ ] LVAD  [x ] Ventilator  [ x] pacemaker [ ] Impella.                      [ x] ABG's     [x ] Pulse Oxymetry Monitoring  Bedside evaluation , monitoring , treatment of hemodynamics , fluids , IVP/ IVCD meds.        Diagnosis:     Op Day - MV repair / CABG X 1 V    Hypotension    Hypovolemia    Hemodynamic lability,  instability. Requires IVCD [ x] vasopressors [ ] inotropes  [ ] vasodilator  [x ]IVSS fluid  to maintain MAP, perfusion, C.I.     Temporary pacemaker (TPM) interrogation and setting.     Chest Tube Drainage     CHF- acute [ x]   chronic [x ]    systolic [x ]   diatolic [ ]          - Echo- EF -  MR            [ ] RV dysfunction          - Cxr-cardiomegally, edema          - Clinical-  [ ]inotropes   [ x]pressors   [ ]diuresis   [ ]IABP   [ ]ECMO   [ ]LVAD   [ ]Respiratory Failure    Ventilator Management:  [ x]AC-rest    [x ]CPAP-PS Wean this PM     [ ]Trach Collar     [ ]Extubate    [ ] T-Piece  [ ]peep>5     Requires chest PT, pulmonary toilet, ambu bagging, suctioning to maintain SaO2,  patent airway and treat atelectasis.     Hermann Roxana catheter interpretation and therapeutic management of unstable hemodynamics     Requires bedside physical therapy, mobilization and total retirement care.     Smoker                     -                     Discussed with CT surgeon, Physician's Assistant - Nurse Practitioner- Critical care medicine team.   Dicussed at  AM / PM rounds.   Chart, labs , films reviewed.    Total Time: 30 min

## 2020-11-21 NOTE — BRIEF OPERATIVE NOTE - OPERATION/FINDINGS
Prolapse of P2.  Mitral valve reconstruction with quadrangular resection and 34mm Physio 2 ring.  Endocardial closure of left atrial appendage.  CABG x1- Saphenous vein graft to RCA

## 2020-11-21 NOTE — BRIEF OPERATIVE NOTE - NSICDXBRIEFPOSTOP_GEN_ALL_CORE_FT
POST-OP DIAGNOSIS:  CAD in native artery 21-Nov-2020 12:27:50  Brian Barlow  Mitral regurgitation 21-Nov-2020 12:27:43  Brian Barlow

## 2020-11-21 NOTE — BRIEF OPERATIVE NOTE - NSICDXBRIEFPROCEDURE_GEN_ALL_CORE_FT
PROCEDURES:  Closure of left atrial appendage 21-Nov-2020 12:27:18  Brian Barlow  CABG, using 1 venous graft 21-Nov-2020 12:27:04  Brian Barlow  Reconstruction, radical, mitral valve, using prosth ring, w/ cardiopulm bypass 21-Nov-2020 12:26:56  Brian Barlow
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
## 2020-11-22 LAB
ALBUMIN SERPL ELPH-MCNC: 3.3 G/DL — SIGNIFICANT CHANGE UP (ref 3.3–5)
ALP SERPL-CCNC: 26 U/L — LOW (ref 40–120)
ALT FLD-CCNC: 58 U/L — HIGH (ref 10–45)
ANION GAP SERPL CALC-SCNC: 9 MMOL/L — SIGNIFICANT CHANGE UP (ref 5–17)
APTT BLD: 29 SEC — SIGNIFICANT CHANGE UP (ref 27.5–35.5)
AST SERPL-CCNC: 87 U/L — HIGH (ref 10–40)
BASE EXCESS BLDMV CALC-SCNC: -0.1 MMOL/L — SIGNIFICANT CHANGE UP (ref -3–3)
BASE EXCESS BLDMV CALC-SCNC: -0.5 MMOL/L — SIGNIFICANT CHANGE UP (ref -3–3)
BILIRUB SERPL-MCNC: 1.7 MG/DL — HIGH (ref 0.2–1.2)
BUN SERPL-MCNC: 14 MG/DL — SIGNIFICANT CHANGE UP (ref 7–23)
CALCIUM SERPL-MCNC: 8.4 MG/DL — SIGNIFICANT CHANGE UP (ref 8.4–10.5)
CHLORIDE SERPL-SCNC: 106 MMOL/L — SIGNIFICANT CHANGE UP (ref 96–108)
CO2 BLDMV-SCNC: 26 MMOL/L — SIGNIFICANT CHANGE UP (ref 21–29)
CO2 BLDMV-SCNC: 26 MMOL/L — SIGNIFICANT CHANGE UP (ref 21–29)
CO2 SERPL-SCNC: 21 MMOL/L — LOW (ref 22–31)
CREAT SERPL-MCNC: 1.09 MG/DL — SIGNIFICANT CHANGE UP (ref 0.5–1.3)
GAS PNL BLDA: SIGNIFICANT CHANGE UP
GAS PNL BLDA: SIGNIFICANT CHANGE UP
GAS PNL BLDMV: SIGNIFICANT CHANGE UP
GAS PNL BLDMV: SIGNIFICANT CHANGE UP
GLUCOSE BLDC GLUCOMTR-MCNC: 146 MG/DL — HIGH (ref 70–99)
GLUCOSE BLDC GLUCOMTR-MCNC: 92 MG/DL — SIGNIFICANT CHANGE UP (ref 70–99)
GLUCOSE BLDC GLUCOMTR-MCNC: 93 MG/DL — SIGNIFICANT CHANGE UP (ref 70–99)
GLUCOSE SERPL-MCNC: 121 MG/DL — HIGH (ref 70–99)
HCO3 BLDMV-SCNC: 24 MMOL/L — SIGNIFICANT CHANGE UP (ref 20–28)
HCO3 BLDMV-SCNC: 25 MMOL/L — SIGNIFICANT CHANGE UP (ref 20–28)
HCT VFR BLD CALC: 29.9 % — LOW (ref 39–50)
HGB BLD-MCNC: 10.2 G/DL — LOW (ref 13–17)
HOROWITZ INDEX BLDMV+IHG-RTO: 40 — SIGNIFICANT CHANGE UP
HOROWITZ INDEX BLDMV+IHG-RTO: 40 — SIGNIFICANT CHANGE UP
INR BLD: 1.25 RATIO — HIGH (ref 0.88–1.16)
MAGNESIUM SERPL-MCNC: 1.8 MG/DL — SIGNIFICANT CHANGE UP (ref 1.6–2.6)
MCHC RBC-ENTMCNC: 29.7 PG — SIGNIFICANT CHANGE UP (ref 27–34)
MCHC RBC-ENTMCNC: 34.1 GM/DL — SIGNIFICANT CHANGE UP (ref 32–36)
MCV RBC AUTO: 86.9 FL — SIGNIFICANT CHANGE UP (ref 80–100)
NRBC # BLD: 0 /100 WBCS — SIGNIFICANT CHANGE UP (ref 0–0)
O2 CT VFR BLD CALC: 36 MMHG — SIGNIFICANT CHANGE UP (ref 30–65)
O2 CT VFR BLD CALC: 38 MMHG — SIGNIFICANT CHANGE UP (ref 30–65)
PCO2 BLDMV: 44 MMHG — SIGNIFICANT CHANGE UP (ref 30–65)
PCO2 BLDMV: 44 MMHG — SIGNIFICANT CHANGE UP (ref 30–65)
PH BLDMV: 7.36 — SIGNIFICANT CHANGE UP (ref 7.32–7.45)
PH BLDMV: 7.37 — SIGNIFICANT CHANGE UP (ref 7.32–7.45)
PHOSPHATE SERPL-MCNC: 3.6 MG/DL — SIGNIFICANT CHANGE UP (ref 2.5–4.5)
PLATELET # BLD AUTO: 91 K/UL — LOW (ref 150–400)
POTASSIUM SERPL-MCNC: 4.3 MMOL/L — SIGNIFICANT CHANGE UP (ref 3.5–5.3)
POTASSIUM SERPL-SCNC: 4.3 MMOL/L — SIGNIFICANT CHANGE UP (ref 3.5–5.3)
PROT SERPL-MCNC: 4.8 G/DL — LOW (ref 6–8.3)
PROTHROM AB SERPL-ACNC: 14.8 SEC — HIGH (ref 10.6–13.6)
RBC # BLD: 3.44 M/UL — LOW (ref 4.2–5.8)
RBC # FLD: 13.1 % — SIGNIFICANT CHANGE UP (ref 10.3–14.5)
SAO2 % BLDMV: 60 % — SIGNIFICANT CHANGE UP (ref 60–90)
SAO2 % BLDMV: 64 % — SIGNIFICANT CHANGE UP (ref 60–90)
SODIUM SERPL-SCNC: 136 MMOL/L — SIGNIFICANT CHANGE UP (ref 135–145)
WBC # BLD: 13.11 K/UL — HIGH (ref 3.8–10.5)
WBC # FLD AUTO: 13.11 K/UL — HIGH (ref 3.8–10.5)

## 2020-11-22 PROCEDURE — 99291 CRITICAL CARE FIRST HOUR: CPT

## 2020-11-22 PROCEDURE — 93010 ELECTROCARDIOGRAM REPORT: CPT

## 2020-11-22 PROCEDURE — 99024 POSTOP FOLLOW-UP VISIT: CPT

## 2020-11-22 PROCEDURE — 71045 X-RAY EXAM CHEST 1 VIEW: CPT | Mod: 26

## 2020-11-22 RX ORDER — HYDROMORPHONE HYDROCHLORIDE 2 MG/ML
1 INJECTION INTRAMUSCULAR; INTRAVENOUS; SUBCUTANEOUS ONCE
Refills: 0 | Status: DISCONTINUED | OUTPATIENT
Start: 2020-11-22 | End: 2020-11-22

## 2020-11-22 RX ORDER — LANOLIN ALCOHOL/MO/W.PET/CERES
5 CREAM (GRAM) TOPICAL AT BEDTIME
Refills: 0 | Status: DISCONTINUED | OUTPATIENT
Start: 2020-11-22 | End: 2020-11-27

## 2020-11-22 RX ORDER — WARFARIN SODIUM 2.5 MG/1
5 TABLET ORAL ONCE
Refills: 0 | Status: COMPLETED | OUTPATIENT
Start: 2020-11-22 | End: 2020-11-22

## 2020-11-22 RX ORDER — INSULIN LISPRO 100/ML
VIAL (ML) SUBCUTANEOUS AT BEDTIME
Refills: 0 | Status: DISCONTINUED | OUTPATIENT
Start: 2020-11-22 | End: 2020-11-23

## 2020-11-22 RX ORDER — ATORVASTATIN CALCIUM 80 MG/1
40 TABLET, FILM COATED ORAL AT BEDTIME
Refills: 0 | Status: DISCONTINUED | OUTPATIENT
Start: 2020-11-22 | End: 2020-11-27

## 2020-11-22 RX ORDER — MAGNESIUM SULFATE 500 MG/ML
2 VIAL (ML) INJECTION ONCE
Refills: 0 | Status: COMPLETED | OUTPATIENT
Start: 2020-11-22 | End: 2020-11-22

## 2020-11-22 RX ORDER — ASPIRIN/CALCIUM CARB/MAGNESIUM 324 MG
81 TABLET ORAL DAILY
Refills: 0 | Status: DISCONTINUED | OUTPATIENT
Start: 2020-11-22 | End: 2020-11-27

## 2020-11-22 RX ORDER — ENOXAPARIN SODIUM 100 MG/ML
40 INJECTION SUBCUTANEOUS DAILY
Refills: 0 | Status: DISCONTINUED | OUTPATIENT
Start: 2020-11-22 | End: 2020-11-26

## 2020-11-22 RX ORDER — INSULIN LISPRO 100/ML
VIAL (ML) SUBCUTANEOUS
Refills: 0 | Status: DISCONTINUED | OUTPATIENT
Start: 2020-11-22 | End: 2020-11-23

## 2020-11-22 RX ORDER — SODIUM CHLORIDE 9 MG/ML
250 INJECTION, SOLUTION INTRAVENOUS ONCE
Refills: 0 | Status: COMPLETED | OUTPATIENT
Start: 2020-11-22 | End: 2020-11-22

## 2020-11-22 RX ADMIN — Medication 100 MILLIGRAM(S): at 16:30

## 2020-11-22 RX ADMIN — HYDROMORPHONE HYDROCHLORIDE 30 MILLILITER(S): 2 INJECTION INTRAMUSCULAR; INTRAVENOUS; SUBCUTANEOUS at 19:23

## 2020-11-22 RX ADMIN — HYDROMORPHONE HYDROCHLORIDE 30 MILLILITER(S): 2 INJECTION INTRAMUSCULAR; INTRAVENOUS; SUBCUTANEOUS at 07:17

## 2020-11-22 RX ADMIN — HYDROMORPHONE HYDROCHLORIDE 0.5 MILLIGRAM(S): 2 INJECTION INTRAMUSCULAR; INTRAVENOUS; SUBCUTANEOUS at 01:55

## 2020-11-22 RX ADMIN — Medication 100 MILLIGRAM(S): at 08:25

## 2020-11-22 RX ADMIN — Medication 81 MILLIGRAM(S): at 12:00

## 2020-11-22 RX ADMIN — ATORVASTATIN CALCIUM 40 MILLIGRAM(S): 80 TABLET, FILM COATED ORAL at 21:25

## 2020-11-22 RX ADMIN — Medication 50 GRAM(S): at 02:15

## 2020-11-22 RX ADMIN — HYDROMORPHONE HYDROCHLORIDE 1 MILLIGRAM(S): 2 INJECTION INTRAMUSCULAR; INTRAVENOUS; SUBCUTANEOUS at 04:00

## 2020-11-22 RX ADMIN — HYDROMORPHONE HYDROCHLORIDE 30 MILLILITER(S): 2 INJECTION INTRAMUSCULAR; INTRAVENOUS; SUBCUTANEOUS at 05:36

## 2020-11-22 RX ADMIN — Medication 5 MILLIGRAM(S): at 22:35

## 2020-11-22 RX ADMIN — ENOXAPARIN SODIUM 40 MILLIGRAM(S): 100 INJECTION SUBCUTANEOUS at 12:00

## 2020-11-22 RX ADMIN — SODIUM CHLORIDE 1000 MILLILITER(S): 9 INJECTION, SOLUTION INTRAVENOUS at 02:08

## 2020-11-22 RX ADMIN — WARFARIN SODIUM 5 MILLIGRAM(S): 2.5 TABLET ORAL at 21:25

## 2020-11-22 RX ADMIN — Medication 10 MILLIGRAM(S): at 21:25

## 2020-11-22 RX ADMIN — Medication 10 MILLIGRAM(S): at 05:51

## 2020-11-22 RX ADMIN — Medication 10 MILLIGRAM(S): at 13:19

## 2020-11-22 RX ADMIN — HYDROMORPHONE HYDROCHLORIDE 0.5 MILLIGRAM(S): 2 INJECTION INTRAMUSCULAR; INTRAVENOUS; SUBCUTANEOUS at 01:40

## 2020-11-22 RX ADMIN — HYDROMORPHONE HYDROCHLORIDE 1 MILLIGRAM(S): 2 INJECTION INTRAMUSCULAR; INTRAVENOUS; SUBCUTANEOUS at 04:15

## 2020-11-22 NOTE — PROGRESS NOTE ADULT - SUBJECTIVE AND OBJECTIVE BOX
Day 1 of Anesthesia Pain Management Service    SUBJECTIVE: Patient is doing well with IV PCA    Pain Scale Score:	[X] Refer to charted pain scores    THERAPY:    [ ] IV PCA Morphine		[ ] 5 mg/mL	[ ] 1 mg/mL  [X] IV PCA Hydromorphone	[ ] 5 mg/mL	[X] 1 mg/mL  [ ] IV PCA Fentanyl		[ ] 50 micrograms/mL    Demand dose: 0.3 mg     Lockout: 6 minutes   Continuous Rate: 0 mg/hr  4 Hour Limit: 4 mg    MEDICATIONS  (STANDING):  aspirin enteric coated 81 milliGRAM(s) Oral daily  atorvastatin 40 milliGRAM(s) Oral at bedtime  cefuroxime  IVPB 1500 milliGRAM(s) IV Intermittent every 8 hours  dexMEDEtomidine Infusion 0.2 MICROgram(s)/kG/Hr (3.84 mL/Hr) IV Continuous <Continuous>  enoxaparin Injectable 40 milliGRAM(s) SubCutaneous daily  HYDROmorphone PCA (1 mG/mL) 30 milliLiter(s) PCA Continuous PCA Continuous  insulin lispro (ADMELOG) corrective regimen sliding scale   SubCutaneous three times a day before meals  insulin lispro (ADMELOG) corrective regimen sliding scale   SubCutaneous at bedtime  metoclopramide Injectable 10 milliGRAM(s) IV Push every 8 hours  norepinephrine Infusion 0.05 MICROgram(s)/kG/Min (7.2 mL/Hr) IV Continuous <Continuous>  sodium chloride 0.9%. 1000 milliLiter(s) (10 mL/Hr) IV Continuous <Continuous>  warfarin 5 milliGRAM(s) Oral once    MEDICATIONS  (PRN):  HYDROmorphone PCA (1 mG/mL) Rescue Clinician Bolus 0.5 milliGRAM(s) IV Push every 15 minutes PRN for Pain Scale GREATER THAN 6  naloxone Injectable 0.1 milliGRAM(s) IV Push every 3 minutes PRN For ANY of the following changes in patient status:  A. RR LESS THAN 10 breaths per minute, B. Oxygen saturation LESS THAN 90%, C. Sedation score of 6  ondansetron Injectable 4 milliGRAM(s) IV Push every 6 hours PRN Nausea      OBJECTIVE:    Sedation Score:	[ X] Alert	[ ] Drowsy 	[ ] Arousable	[ ] Asleep	[ ] Unresponsive    Side Effects:	[X ] None	[ ] Nausea	[ ] Vomiting	[ ] Pruritus  		[ ] Other:    Vital Signs Last 24 Hrs  T(C): 37.3 (22 Nov 2020 08:00), Max: 37.4 (22 Nov 2020 04:00)  T(F): 99.1 (22 Nov 2020 08:00), Max: 99.3 (22 Nov 2020 04:00)  HR: 80 (22 Nov 2020 10:45) (68 - 110)  BP: 105/70 (22 Nov 2020 10:00) (93/64 - 114/78)  BP(mean): 84 (22 Nov 2020 10:00) (74 - 92)  RR: 10 (22 Nov 2020 10:45) (0 - 26)  SpO2: 100% (22 Nov 2020 10:45) (92% - 100%)    ASSESSMENT/ PLAN    Therapy to  be:               [X] Continued   [ ] Discontinued   [ ] Changed to PRN Analgesics    Documentation and Verification of current medications:   [X] Done	[ ] Not done, not eligible    Comments:

## 2020-11-22 NOTE — PHYSICAL THERAPY INITIAL EVALUATION ADULT - PLANNED THERAPY INTERVENTIONS, PT EVAL
gait training/transfer training/1. LTG Pt will be indep to neg 3 steps w/ HR w/in 2 wks/bed mobility training

## 2020-11-22 NOTE — PHYSICAL THERAPY INITIAL EVALUATION ADULT - ADDITIONAL COMMENTS
Patient lives in [facility] with spouse, 1 step to enter, bedroom/bathroom on main level, patient reports [facility] has 3 flights but he does not have to go upstairs Patient lives in [facility] with spouse, 3 step to enter, bedroom/bathroom on main level, patient reports [facility] has 3 flights but he does not have to go upstairs
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
## 2020-11-22 NOTE — PHYSICAL THERAPY INITIAL EVALUATION ADULT - PRECAUTIONS/LIMITATIONS, REHAB EVAL
sternal precautions/cardiac precautions oxygen therapy device and L/min/cardiac precautions/sternal precautions/4L

## 2020-11-23 DIAGNOSIS — B19.20 UNSPECIFIED VIRAL HEPATITIS C WITHOUT HEPATIC COMA: ICD-10-CM

## 2020-11-23 DIAGNOSIS — I49.8 OTHER SPECIFIED CARDIAC ARRHYTHMIAS: ICD-10-CM

## 2020-11-23 DIAGNOSIS — Z95.1 PRESENCE OF AORTOCORONARY BYPASS GRAFT: ICD-10-CM

## 2020-11-23 DIAGNOSIS — Z98.890 OTHER SPECIFIED POSTPROCEDURAL STATES: ICD-10-CM

## 2020-11-23 PROBLEM — Z00.00 ENCOUNTER FOR PREVENTIVE HEALTH EXAMINATION: Status: ACTIVE | Noted: 2020-11-23

## 2020-11-23 LAB
ALBUMIN SERPL ELPH-MCNC: 3.1 G/DL — LOW (ref 3.3–5)
ALP SERPL-CCNC: 27 U/L — LOW (ref 40–120)
ALT FLD-CCNC: 48 U/L — HIGH (ref 10–45)
ANION GAP SERPL CALC-SCNC: 9 MMOL/L — SIGNIFICANT CHANGE UP (ref 5–17)
APTT BLD: 28.8 SEC — SIGNIFICANT CHANGE UP (ref 27.5–35.5)
AST SERPL-CCNC: 57 U/L — HIGH (ref 10–40)
BASE EXCESS BLDV CALC-SCNC: 0.2 MMOL/L — SIGNIFICANT CHANGE UP (ref -2–2)
BILIRUB SERPL-MCNC: 0.9 MG/DL — SIGNIFICANT CHANGE UP (ref 0.2–1.2)
BUN SERPL-MCNC: 14 MG/DL — SIGNIFICANT CHANGE UP (ref 7–23)
CA-I SERPL-SCNC: 1.14 MMOL/L — SIGNIFICANT CHANGE UP (ref 1.12–1.3)
CALCIUM SERPL-MCNC: 8.1 MG/DL — LOW (ref 8.4–10.5)
CHLORIDE BLDV-SCNC: 104 MMOL/L — SIGNIFICANT CHANGE UP (ref 96–108)
CHLORIDE SERPL-SCNC: 102 MMOL/L — SIGNIFICANT CHANGE UP (ref 96–108)
CO2 BLDV-SCNC: 27 MMOL/L — SIGNIFICANT CHANGE UP (ref 22–30)
CO2 SERPL-SCNC: 23 MMOL/L — SIGNIFICANT CHANGE UP (ref 22–31)
CREAT SERPL-MCNC: 0.93 MG/DL — SIGNIFICANT CHANGE UP (ref 0.5–1.3)
GAS PNL BLDV: 133 MMOL/L — LOW (ref 135–145)
GAS PNL BLDV: SIGNIFICANT CHANGE UP
GAS PNL BLDV: SIGNIFICANT CHANGE UP
GLUCOSE BLDV-MCNC: 112 MG/DL — HIGH (ref 70–99)
GLUCOSE SERPL-MCNC: 120 MG/DL — HIGH (ref 70–99)
HCO3 BLDV-SCNC: 26 MMOL/L — SIGNIFICANT CHANGE UP (ref 21–29)
HCT VFR BLD CALC: 29.9 % — LOW (ref 39–50)
HCT VFR BLDA CALC: 30 % — LOW (ref 39–50)
HGB BLD CALC-MCNC: 9.6 G/DL — LOW (ref 13–17)
HGB BLD-MCNC: 9.9 G/DL — LOW (ref 13–17)
HOROWITZ INDEX BLDV+IHG-RTO: 32 — SIGNIFICANT CHANGE UP
INR BLD: 1.25 RATIO — HIGH (ref 0.88–1.16)
LACTATE BLDV-MCNC: 1.1 MMOL/L — SIGNIFICANT CHANGE UP (ref 0.7–2)
MAGNESIUM SERPL-MCNC: 1.8 MG/DL — SIGNIFICANT CHANGE UP (ref 1.6–2.6)
MCHC RBC-ENTMCNC: 29.4 PG — SIGNIFICANT CHANGE UP (ref 27–34)
MCHC RBC-ENTMCNC: 33.1 GM/DL — SIGNIFICANT CHANGE UP (ref 32–36)
MCV RBC AUTO: 88.7 FL — SIGNIFICANT CHANGE UP (ref 80–100)
NRBC # BLD: 0 /100 WBCS — SIGNIFICANT CHANGE UP (ref 0–0)
OTHER CELLS CSF MANUAL: 8 ML/DL — LOW (ref 18–22)
PCO2 BLDV: 48 MMHG — SIGNIFICANT CHANGE UP (ref 35–50)
PH BLDV: 7.35 — SIGNIFICANT CHANGE UP (ref 7.35–7.45)
PHOSPHATE SERPL-MCNC: 2.1 MG/DL — LOW (ref 2.5–4.5)
PLATELET # BLD AUTO: 86 K/UL — LOW (ref 150–400)
PO2 BLDV: 36 MMHG — SIGNIFICANT CHANGE UP (ref 25–45)
POTASSIUM BLDV-SCNC: 4.2 MMOL/L — SIGNIFICANT CHANGE UP (ref 3.5–5.3)
POTASSIUM SERPL-MCNC: 3.9 MMOL/L — SIGNIFICANT CHANGE UP (ref 3.5–5.3)
POTASSIUM SERPL-SCNC: 3.9 MMOL/L — SIGNIFICANT CHANGE UP (ref 3.5–5.3)
PROT SERPL-MCNC: 5.1 G/DL — LOW (ref 6–8.3)
PROTHROM AB SERPL-ACNC: 14.8 SEC — HIGH (ref 10.6–13.6)
RBC # BLD: 3.37 M/UL — LOW (ref 4.2–5.8)
RBC # FLD: 13.2 % — SIGNIFICANT CHANGE UP (ref 10.3–14.5)
SAO2 % BLDV: 62 % — LOW (ref 67–88)
SODIUM SERPL-SCNC: 134 MMOL/L — LOW (ref 135–145)
WBC # BLD: 19.42 K/UL — HIGH (ref 3.8–10.5)
WBC # FLD AUTO: 19.42 K/UL — HIGH (ref 3.8–10.5)

## 2020-11-23 PROCEDURE — 99222 1ST HOSP IP/OBS MODERATE 55: CPT | Mod: GC

## 2020-11-23 PROCEDURE — 93010 ELECTROCARDIOGRAM REPORT: CPT

## 2020-11-23 PROCEDURE — 99233 SBSQ HOSP IP/OBS HIGH 50: CPT

## 2020-11-23 PROCEDURE — 99232 SBSQ HOSP IP/OBS MODERATE 35: CPT

## 2020-11-23 PROCEDURE — 71045 X-RAY EXAM CHEST 1 VIEW: CPT | Mod: 26

## 2020-11-23 PROCEDURE — 99223 1ST HOSP IP/OBS HIGH 75: CPT

## 2020-11-23 RX ORDER — POTASSIUM CHLORIDE 20 MEQ
10 PACKET (EA) ORAL
Refills: 0 | Status: COMPLETED | OUTPATIENT
Start: 2020-11-23 | End: 2020-11-23

## 2020-11-23 RX ORDER — WARFARIN SODIUM 2.5 MG/1
5 TABLET ORAL ONCE
Refills: 0 | Status: DISCONTINUED | OUTPATIENT
Start: 2020-11-23 | End: 2020-11-23

## 2020-11-23 RX ORDER — WARFARIN SODIUM 2.5 MG/1
5 TABLET ORAL ONCE
Refills: 0 | Status: COMPLETED | OUTPATIENT
Start: 2020-11-23 | End: 2020-11-23

## 2020-11-23 RX ORDER — SODIUM CHLORIDE 9 MG/ML
3 INJECTION INTRAMUSCULAR; INTRAVENOUS; SUBCUTANEOUS EVERY 8 HOURS
Refills: 0 | Status: DISCONTINUED | OUTPATIENT
Start: 2020-11-23 | End: 2020-11-27

## 2020-11-23 RX ORDER — METOCLOPRAMIDE HCL 10 MG
10 TABLET ORAL EVERY 8 HOURS
Refills: 0 | Status: DISCONTINUED | OUTPATIENT
Start: 2020-11-23 | End: 2020-11-26

## 2020-11-23 RX ORDER — POLYETHYLENE GLYCOL 3350 17 G/17G
17 POWDER, FOR SOLUTION ORAL DAILY
Refills: 0 | Status: DISCONTINUED | OUTPATIENT
Start: 2020-11-23 | End: 2020-11-27

## 2020-11-23 RX ADMIN — Medication 10 MILLIGRAM(S): at 12:23

## 2020-11-23 RX ADMIN — HYDROMORPHONE HYDROCHLORIDE 30 MILLILITER(S): 2 INJECTION INTRAMUSCULAR; INTRAVENOUS; SUBCUTANEOUS at 07:27

## 2020-11-23 RX ADMIN — Medication 10 MILLIGRAM(S): at 22:06

## 2020-11-23 RX ADMIN — SODIUM CHLORIDE 3 MILLILITER(S): 9 INJECTION INTRAMUSCULAR; INTRAVENOUS; SUBCUTANEOUS at 14:00

## 2020-11-23 RX ADMIN — Medication 10 MILLIGRAM(S): at 06:05

## 2020-11-23 RX ADMIN — ATORVASTATIN CALCIUM 40 MILLIGRAM(S): 80 TABLET, FILM COATED ORAL at 22:07

## 2020-11-23 RX ADMIN — Medication 50 MILLIEQUIVALENT(S): at 01:50

## 2020-11-23 RX ADMIN — SODIUM CHLORIDE 3 MILLILITER(S): 9 INJECTION INTRAMUSCULAR; INTRAVENOUS; SUBCUTANEOUS at 22:07

## 2020-11-23 RX ADMIN — Medication 50 MILLIEQUIVALENT(S): at 03:02

## 2020-11-23 RX ADMIN — WARFARIN SODIUM 5 MILLIGRAM(S): 2.5 TABLET ORAL at 22:07

## 2020-11-23 RX ADMIN — HYDROMORPHONE HYDROCHLORIDE 30 MILLILITER(S): 2 INJECTION INTRAMUSCULAR; INTRAVENOUS; SUBCUTANEOUS at 11:02

## 2020-11-23 RX ADMIN — Medication 83.33 MILLIMOLE(S): at 04:50

## 2020-11-23 RX ADMIN — POLYETHYLENE GLYCOL 3350 17 GRAM(S): 17 POWDER, FOR SOLUTION ORAL at 12:23

## 2020-11-23 RX ADMIN — ENOXAPARIN SODIUM 40 MILLIGRAM(S): 100 INJECTION SUBCUTANEOUS at 12:23

## 2020-11-23 RX ADMIN — Medication 5 MILLIGRAM(S): at 22:06

## 2020-11-23 RX ADMIN — Medication 100 MILLIGRAM(S): at 08:01

## 2020-11-23 RX ADMIN — Medication 50 MILLIEQUIVALENT(S): at 03:17

## 2020-11-23 RX ADMIN — Medication 81 MILLIGRAM(S): at 12:23

## 2020-11-23 RX ADMIN — Medication 100 MILLIGRAM(S): at 00:12

## 2020-11-23 NOTE — CONSULT NOTE ADULT - PROBLEM SELECTOR RECOMMENDATION 9
Unclear etiology for junctional rhythm. No evidence of AV block. Will continue to monitor on telemetry. HR >60s and BP wnl. TSH was wnl. Not on amiodarone, digoxin or BB.  - No urgent need for pacemaker currently. With complete heart block, likely from recent open heart surgery. Continue to monitor on telemetry. Expect that AV block may resolve with time and improvement in post-surgical inflammation. If patient remains in [facility] by 11/27, will recommend PPM. Pacing wire present.
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
## 2020-11-23 NOTE — CONSULT NOTE ADULT - ASSESSMENT
61 y/o M with HTN, and recently diagnosed MVP with severe regurgitation p/w dyspnea. Patient is S/P CABG two days ago and S/P MVR.  GI are consulted for positive HCV serology , with positive HCV PCR . Patient mentioned history of hepatitis C for more than 30 years , mentioned that he received treatment with shots for around 6 months 30 years ago , denies any history of previous GI bleed , no history of encephalopathy. Denies melena, fresh blood per rectum or abdominal pain during this hospitalization.      #chronic hepatitis C , mild elevation in AST and ALT   Thrombocytopenia   INR 1.2   No Signs of cirrhosis   No encephalopathy     REC:   US abdomen , r/o liver cirrhosis   patient will need to follow up as outpatient in hepatology clinic for treatment of hepatitis C 59 y/o M with HTN, and recently diagnosed MVP with severe regurgitation p/w dyspnea. Patient is S/P CABG two days ago and S/P MVR.  GI are consulted for positive HCV serology , with positive HCV PCR . Patient mentioned history of hepatitis C for more than 30 years , mentioned that he received treatment with shots for around 6 months 30 years ago , denies any history of previous GI bleed , no history of encephalopathy. Denies melena, fresh blood per rectum or abdominal pain during this hospitalization.      #chronic hepatitis C , mild elevation in AST and ALT   Thrombocytopenia   INR 1.2   No physical exam findings of cirrhosis   No encephalopathy     REC:   US abdomen , evaluate for liver cirrhosis   patient will need to follow up as outpatient in hepatology clinic for treatment of hepatitis C

## 2020-11-23 NOTE — PROGRESS NOTE ADULT - PROBLEM SELECTOR PLAN 3
EP following   Per EP junctional rhythm, no evidence of AV block - no indication for PPM at this time  Continue to monitor off AV nodals   Maintain PW VVI 54/7 for now

## 2020-11-23 NOTE — CONSULT NOTE ADULT - ATTENDING COMMENTS
seen and examined with resident. I agree with H & P.  CHB post MV surgery.  Narrow complex escape.  Hopefully this will improve and we can avoid pacing in this 60 year old man.  Will follow with you.

## 2020-11-23 NOTE — PROGRESS NOTE ADULT - PROBLEM SELECTOR PLAN 4
Positive HCV serology with positive HCV PCR  Hepatology consulted - recs pending  Daily LFTs to trend

## 2020-11-23 NOTE — PROGRESS NOTE ADULT - SUBJECTIVE AND OBJECTIVE BOX
Cardiovascular Disease Progress Note    Overnight events: No acute events overnight. Patient says the pain is controlled.     Otherwise review of systems negative    Objective Findings:  T(C): 37 (20 @ 08:00), Max: 37.2 (20 @ 12:00)  HR: 79 (20 @ 08:00) (79 - 85)  BP: 106/68 (20 @ 08:00) (105/70 - 132/73)  RR: 15 (20 @ 08:00) (0 - 25)  SpO2: 98% (20 @ 08:00) (97% - 100%)  Wt(kg): --  Daily     Daily Weight in k.2 (2020 00:00)      Physical Exam:  Gen: NAD; Patient resting comfortably  HEENT: EOMI, Normocephalic/ atraumatic  CV: RRR, normal S1 + S2, no m/r/g  Lungs:  Normal respiratory effort; decreased air entry to auscultation bilaterally  Abd: soft, non-tender; bowel sounds present  Ext: No edema; warm and well perfused    Telemetry: V-paced    Laboratory Data:                        9.9    19.42 )-----------( 86       ( 2020 00:47 )             29.9         134<L>  |  102  |  14  ----------------------------<  120<H>  3.9   |  23  |  0.93    Ca    8.1<L>      2020 00:47  Phos  2.1       Mg     1.8         TPro  5.1<L>  /  Alb  3.1<L>  /  TBili  0.9  /  DBili  x   /  AST  57<H>  /  ALT  48<H>  /  AlkPhos  27<L>      PT/INR - ( 2020 01:19 )   PT: 14.8 sec;   INR: 1.25 ratio         PTT - ( 2020 01:19 )  PTT:29.0 sec  CARDIAC MARKERS ( 2020 12:39 )  x     / x     / 557 U/L / x     / 63.2 ng/mL          Inpatient Medications:  MEDICATIONS  (STANDING):  aspirin enteric coated 81 milliGRAM(s) Oral daily  atorvastatin 40 milliGRAM(s) Oral at bedtime  enoxaparin Injectable 40 milliGRAM(s) SubCutaneous daily  HYDROmorphone PCA (1 mG/mL) 30 milliLiter(s) PCA Continuous PCA Continuous  melatonin 5 milliGRAM(s) Oral at bedtime  sodium chloride 0.9%. 1000 milliLiter(s) (10 mL/Hr) IV Continuous <Continuous>  warfarin 5 milliGRAM(s) Oral once      Assessment:  60 year old man with HTN, CAD and mitral valve prolapse with severe regurgitation presents with SOB.    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  Status post MV repair with WILBER ligation on .  Successfully extubated and off Levophed  Lactate WNL.   Warfarin dosing for goal INR 2-3.     #CAD-  60% RCA lesion.  S/p CABG x 1 with SVG to RCA.  ASA and statin.     Rest of care as per CTICU.         Over 25 minutes spent on total encounter; more than 50% of the visit was spent counseling and/or coordinating care by the attending physician.      Luís Gold MD [facility]  Cardiovascular Disease  [phone]

## 2020-11-23 NOTE — PROGRESS NOTE ADULT - SUBJECTIVE AND OBJECTIVE BOX
Subjective:    Telemetry:      Vital Signs Last 24 Hrs  T(C): 37 (20 @ 11:00), Max: 37 (20 @ 16:00)  T(F): 98.6 (20 @ 11:00), Max: 98.6 (20 @ 16:00)  HR: 66 (20 @ 11:00) (66 - 80)  BP: 116/69 (20 @ 11:00) (106/68 - 132/73)  RR: 15 (20 @ 11:00) (10 - 25)  SpO2: 94% (20 @ 11:00) (94% - 100%)                    @ 07:01  -   @ 07:00  --------------------------------------------------------  IN: 2013.5 mL / OUT: 995 mL / NET: 1018.5 mL     @ 07:01  -   @ 13:18  --------------------------------------------------------  IN: 102.5 mL / OUT: 0 mL / NET: 102.5 mL          Daily     Daily Weight in k.2 (2020 00:00)        CAPILLARY BLOOD GLUCOSE      POCT Blood Glucose.: 93 mg/dL (2020 22:49)  POCT Blood Glucose.: 92 mg/dL (2020 16:50)          Drains:     MS         [  ] Drainage:                 L Pleural  [  ]  Drainage:                R Pleural  [  ]  Drainage:    Pacing Wires        [  ]   Settings:                                  Isolated  [  ]    Coumadin    [ ] YES          [  ]      NO         Reason:                                 9.9    19.42 )-----------( 86       ( 2020 00:47 )             29.9           134<L>  |  102  |  14  ----------------------------<  120<H>  3.9   |  23  |  0.93    Ca    8.1<L>      2020 00:47  Phos  2.1       Mg     1.8         TPro  5.1<L>  /  Alb  3.1<L>  /  TBili  0.9  /  DBili  x   /  AST  57<H>  /  ALT  48<H>  /  AlkPhos  27<L>        PT/INR - ( 2020 01:19 )   PT: 14.8 sec;   INR: 1.25 ratio         PTT - ( 2020 01:19 )  PTT:29.0 sec    PHYSICAL EXAM:    Neurology: alert and oriented x 3, nonfocal, no gross deficits    CV:    Sternal Wound: CDI, Stable    Lungs:    Abdomen: soft, nontender, nondistended, (+) bowel sounds, (+) BM    :               Extremities:               aspirin enteric coated 81 milliGRAM(s) Oral daily  atorvastatin 40 milliGRAM(s) Oral at bedtime  enoxaparin Injectable 40 milliGRAM(s) SubCutaneous daily  HYDROmorphone PCA (1 mG/mL) 30 milliLiter(s) PCA Continuous PCA Continuous  HYDROmorphone PCA (1 mG/mL) Rescue Clinician Bolus 0.5 milliGRAM(s) IV Push every 15 minutes PRN  melatonin 5 milliGRAM(s) Oral at bedtime  metoclopramide 10 milliGRAM(s) Oral every 8 hours  naloxone Injectable 0.1 milliGRAM(s) IV Push every 3 minutes PRN  ondansetron Injectable 4 milliGRAM(s) IV Push every 6 hours PRN  polyethylene glycol 3350 17 Gram(s) Oral daily  sodium chloride 0.9% lock flush 3 milliLiter(s) IV Push every 8 hours  warfarin 5 milliGRAM(s) Oral once                    Physical Therapy Rec:   Home  [  ]   Home w/ PT  [  ]  Rehab  [  ]    Discussed with Cardiothoracic Team at AM rounds. Subjective: "I'm okay." Pt reports pain is well controlled w/ PCA. Denies chest pain, SOB, palpitations, headache, dizziness, fever, chills and n/v/d.     Telemetry: AV diss 60s     Vital Signs Last 24 Hrs  T(C): 37 (20 @ 11:00), Max: 37 (20 @ 16:00)  T(F): 98.6 (20 @ 11:00), Max: 98.6 (20 @ 16:00)  HR: 66 (20 @ :) (66 - 80)  BP: 116/69 (20 @ 11:00) (106/68 - 132/73)  RR: 15 (20 @ 11:00) (10 - 25)  SpO2: 94% (20 @ 11:00) (94% - 100%)              @ 07:01  -   @ 07:00  --------------------------------------------------------  IN: 2013.5 mL / OUT: 995 mL / NET: 1018.5 mL     @ 07:01  -   @ 13:18  --------------------------------------------------------  IN: 102.5 mL / OUT: 0 mL / NET: 102.5 mL      Daily Weight in k.2 (2020 00:00)      POCT Blood Glucose.: 93 mg/dL (2020 22:49)  POCT Blood Glucose.: 92 mg/dL (2020 16:50)          Pacing Wires   [ x ] Settings: VVI 54/7                                Coumadin    [ x ] YES      Reason:  s/p MV repair                             9.9    19.42 )-----------( 86       ( 2020 00:47 )             29.9     11-23    134<L>  |  102  |  14  ----------------------------<  120<H>  3.9   |  23  |  0.93    Ca    8.1<L>      2020 00:47  Phos  2.1     11  Mg     1.8         TPro  5.1<L>  /  Alb  3.1<L>  /  TBili  0.9  /  DBili  x   /  AST  57<H>  /  ALT  48<H>  /  AlkPhos  27<L>      PT/INR - ( 2020 01:19 )   PT: 14.8 sec;   INR: 1.25 ratio       PTT - ( 2020 01:19 )  PTT:29.0 sec      PHYSICAL EXAM:    Neurology: alert and oriented x 3, nonfocal, no gross deficits    CV: (+) PW - VVI 54/7, RRR, (+)S1/S2, no murmur appreciated    Sternal Wound: MSI w/ opsite in place - CDI, Stable    Lungs: diminished at b/l bases, otherwise CTA b/l no wheezes, rales or rhonchi     Abdomen: soft, nontender, nondistended, (+) bowel sounds, (-) BM, (+) flatus     : voiding freely                Extremities: R SVG site w/ dressing in place - CDI, no LE edema b/l, PPP b/l, no calf tenderness, SAUCEDO         aspirin enteric coated 81 milliGRAM(s) Oral daily  atorvastatin 40 milliGRAM(s) Oral at bedtime  enoxaparin Injectable 40 milliGRAM(s) SubCutaneous daily  HYDROmorphone PCA (1 mG/mL) 30 milliLiter(s) PCA Continuous PCA Continuous  HYDROmorphone PCA (1 mG/mL) Rescue Clinician Bolus 0.5 milliGRAM(s) IV Push every 15 minutes PRN  melatonin 5 milliGRAM(s) Oral at bedtime  metoclopramide 10 milliGRAM(s) Oral every 8 hours  naloxone Injectable 0.1 milliGRAM(s) IV Push every 3 minutes PRN  ondansetron Injectable 4 milliGRAM(s) IV Push every 6 hours PRN  polyethylene glycol 3350 17 Gram(s) Oral daily  sodium chloride 0.9% lock flush 3 milliLiter(s) IV Push every 8 hours  warfarin 5 milliGRAM(s) Oral once      Physical Therapy Rec:   Home  [ x ]   Home w/ PT  [  ]  Rehab  [  ]    Discussed with Cardiothoracic Team at AM rounds. Subjective: "I'm okay." Pt reports pain is well controlled w/ PCA. Denies chest pain, SOB, palpitations, headache, dizziness, fever, chills and n/v/d.     Telemetry: AV dissociation 60s     Vital Signs Last 24 Hrs  T(C): 37 (20 @ 11:00), Max: 37 (20 @ 16:00)  T(F): 98.6 (20 @ 11:00), Max: 98.6 (20 @ 16:00)  HR: 66 (20 @ :) (66 - 80)  BP: 116/69 (20 @ 11:00) (106/68 - 132/73)  RR: 15 (20 @ 11:00) (10 - 25)  SpO2: 94% (20 @ 11:00) (94% - 100%)              @ 07:01  -   @ 07:00  --------------------------------------------------------  IN: 2013.5 mL / OUT: 995 mL / NET: 1018.5 mL     @ 07:01  -   @ 13:18  --------------------------------------------------------  IN: 102.5 mL / OUT: 0 mL / NET: 102.5 mL      Daily Weight in k.2 (2020 00:00)      POCT Blood Glucose.: 93 mg/dL (2020 22:49)  POCT Blood Glucose.: 92 mg/dL (2020 16:50)          Pacing Wires   [ x ] Settings: VVI 54/7                                Coumadin    [ x ] YES      Reason:  s/p MV repair                             9.9    19.42 )-----------( 86       ( 2020 00:47 )             29.9     11-23    134<L>  |  102  |  14  ----------------------------<  120<H>  3.9   |  23  |  0.93    Ca    8.1<L>      2020 00:47  Phos  2.1     11  Mg     1.8         TPro  5.1<L>  /  Alb  3.1<L>  /  TBili  0.9  /  DBili  x   /  AST  57<H>  /  ALT  48<H>  /  AlkPhos  27<L>      PT/INR - ( 2020 01:19 )   PT: 14.8 sec;   INR: 1.25 ratio       PTT - ( 2020 01:19 )  PTT:29.0 sec      PHYSICAL EXAM:    Neurology: alert and oriented x 3, nonfocal, no gross deficits    CV: (+) PW - VVI 54/7, RRR, (+)S1/S2, no murmur appreciated    Sternal Wound: MSI w/ opsite in place - CDI, Stable    Lungs: diminished at b/l bases, otherwise CTA b/l no wheezes, rales or rhonchi     Abdomen: soft, nontender, nondistended, (+) bowel sounds, (-) BM, (+) flatus     : voiding freely                Extremities: R SVG site w/ dressing in place - CDI, no LE edema b/l, PPP b/l, no calf tenderness, SAUCEDO         aspirin enteric coated 81 milliGRAM(s) Oral daily  atorvastatin 40 milliGRAM(s) Oral at bedtime  enoxaparin Injectable 40 milliGRAM(s) SubCutaneous daily  HYDROmorphone PCA (1 mG/mL) 30 milliLiter(s) PCA Continuous PCA Continuous  HYDROmorphone PCA (1 mG/mL) Rescue Clinician Bolus 0.5 milliGRAM(s) IV Push every 15 minutes PRN  melatonin 5 milliGRAM(s) Oral at bedtime  metoclopramide 10 milliGRAM(s) Oral every 8 hours  naloxone Injectable 0.1 milliGRAM(s) IV Push every 3 minutes PRN  ondansetron Injectable 4 milliGRAM(s) IV Push every 6 hours PRN  polyethylene glycol 3350 17 Gram(s) Oral daily  sodium chloride 0.9% lock flush 3 milliLiter(s) IV Push every 8 hours  warfarin 5 milliGRAM(s) Oral once      Physical Therapy Rec:   Home  [ x ]   Home w/ PT  [  ]  Rehab  [  ]    Discussed with Cardiothoracic Team at AM rounds.
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
## 2020-11-23 NOTE — PROGRESS NOTE ADULT - PROBLEM SELECTOR PLAN 2
Continue post-op care   Continue ASA 81 mg QD   Continue AC w/ Coumadin - plan to dose 5 mg tonight pending today's PT/INR  Pain control w/ PCA pump  Wean O2 via NA as tolerated  Chest PT, encourage incentive spirometry   Increase activity as tolerated, encourage ambulation  Wound care/assessment   Daily PT/INR

## 2020-11-23 NOTE — PROGRESS NOTE ADULT - SUBJECTIVE AND OBJECTIVE BOX
Day 2\1 of Anesthesia Pain Management Service    SUBJECTIVE: I'm doing ok    Pain Scale Score:	[X] Refer to charted pain scores    THERAPY:    [ ] IV PCA Morphine		[ ] 5 mg/mL	[ ] 1 mg/mL  [X] IV PCA Hydromorphone	[ ] 5 mg/mL	[X] 1 mg/mL  [ ] IV PCA Fentanyl		[ ] 50 micrograms/mL    Demand dose: 0.3 mg     Lockout: 6 minutes   Continuous Rate: 0 mg/hr  4 Hour Limit: 4 mg    MEDICATIONS  (STANDING):  aspirin enteric coated 81 milliGRAM(s) Oral daily  atorvastatin 40 milliGRAM(s) Oral at bedtime  enoxaparin Injectable 40 milliGRAM(s) SubCutaneous daily  HYDROmorphone PCA (1 mG/mL) 30 milliLiter(s) PCA Continuous PCA Continuous  melatonin 5 milliGRAM(s) Oral at bedtime  sodium chloride 0.9%. 1000 milliLiter(s) (10 mL/Hr) IV Continuous <Continuous>  warfarin 5 milliGRAM(s) Oral once    MEDICATIONS  (PRN):  HYDROmorphone PCA (1 mG/mL) Rescue Clinician Bolus 0.5 milliGRAM(s) IV Push every 15 minutes PRN for Pain Scale GREATER THAN 6  naloxone Injectable 0.1 milliGRAM(s) IV Push every 3 minutes PRN For ANY of the following changes in patient status:  A. RR LESS THAN 10 breaths per minute, B. Oxygen saturation LESS THAN 90%, C. Sedation score of 6  ondansetron Injectable 4 milliGRAM(s) IV Push every 6 hours PRN Nausea      OBJECTIVE:    Sedation Score:	[ X] Alert 	[ ] Drowsy 	[ ] Arousable	[ ] Asleep	[ ] Unresponsive    Side Effects:	[X ] None	[ ] Nausea	[ ] Vomiting	[ ] Pruritus  		[ ] Other:    Vital Signs Last 24 Hrs  T(C): 37 (23 Nov 2020 08:00), Max: 37.2 (22 Nov 2020 12:00)  T(F): 98.6 (23 Nov 2020 08:00), Max: 99 (22 Nov 2020 12:00)  HR: 79 (23 Nov 2020 08:00) (79 - 85)  BP: 106/68 (23 Nov 2020 08:00) (105/70 - 132/73)  BP(mean): 81 (23 Nov 2020 08:00) (81 - 99)  RR: 15 (23 Nov 2020 08:00) (0 - 25)  SpO2: 98% (23 Nov 2020 08:00) (97% - 100%)    ASSESSMENT/ PLAN    Therapy to  be:               [X] Continued   [ ] Discontinued   [ ] Changed to PRN Analgesics    Documentation and Verification of current medications:   [X] Done	[ ] Not done, not eligible    Comments: OOB in chair. Endorsing some soreness. Using 1-4x/hr. Reeducated to use.

## 2020-11-23 NOTE — CONSULT NOTE ADULT - ASSESSMENT
61 y/o M with HTN, and recently diagnosed MVP with severe MR p/w dyspnea, now s/p MVR POD #2. EP consulted to evaluate need for PPM implantation. 59 y/o M with HTN, CAD with CABG x 1 (saphenous vein graft to RCA for 60% stenosis), MVP with severe MR s/p MVR on 11/21 with prosthetic ring. EP consulted for EKG showing junction rhythm with underlying complete heart block. HR is in 60s, BP wnl. 61 y/o M with HTN, CAD with CABG x 1 (saphenous vein graft to RCA for 60% stenosis), MVP with severe MR s/p MVR on 11/21 with prosthetic ring. EP consulted for EKG showing junctional rhythm with underlying complete heart block. HR is in 60s, BP wnl.

## 2020-11-23 NOTE — PROGRESS NOTE ADULT - PROBLEM SELECTOR PLAN 1
Continue post-op care   Continue ASA 81 mg QD & Atorvastatin 40 mg qhs   Continue off AV nodals for now 2/2 junctional rhythm   Pain control w/ PCA pump  Wean O2 via NA as tolerated  Chest PT, encourage incentive spirometry   Increase activity as tolerated, encourage ambulation  Wound care/assessment

## 2020-11-24 ENCOUNTER — NON-APPOINTMENT (OUTPATIENT)
Age: 60
End: 2020-11-24

## 2020-11-24 DIAGNOSIS — I44.2 ATRIOVENTRICULAR BLOCK, COMPLETE: ICD-10-CM

## 2020-11-24 LAB
ALBUMIN SERPL ELPH-MCNC: 3.4 G/DL — SIGNIFICANT CHANGE UP (ref 3.3–5)
ALP SERPL-CCNC: 36 U/L — LOW (ref 40–120)
ALT FLD-CCNC: 41 U/L — SIGNIFICANT CHANGE UP (ref 10–45)
ANION GAP SERPL CALC-SCNC: 11 MMOL/L — SIGNIFICANT CHANGE UP (ref 5–17)
AST SERPL-CCNC: 42 U/L — HIGH (ref 10–40)
BILIRUB SERPL-MCNC: 1.6 MG/DL — HIGH (ref 0.2–1.2)
BUN SERPL-MCNC: 17 MG/DL — SIGNIFICANT CHANGE UP (ref 7–23)
CALCIUM SERPL-MCNC: 8.7 MG/DL — SIGNIFICANT CHANGE UP (ref 8.4–10.5)
CHLORIDE SERPL-SCNC: 99 MMOL/L — SIGNIFICANT CHANGE UP (ref 96–108)
CO2 SERPL-SCNC: 23 MMOL/L — SIGNIFICANT CHANGE UP (ref 22–31)
CREAT SERPL-MCNC: 1.02 MG/DL — SIGNIFICANT CHANGE UP (ref 0.5–1.3)
GLUCOSE SERPL-MCNC: 89 MG/DL — SIGNIFICANT CHANGE UP (ref 70–99)
HCT VFR BLD CALC: 25.5 % — LOW (ref 39–50)
HGB BLD-MCNC: 8.4 G/DL — LOW (ref 13–17)
INR BLD: 1.26 RATIO — HIGH (ref 0.88–1.16)
MAGNESIUM SERPL-MCNC: 2 MG/DL — SIGNIFICANT CHANGE UP (ref 1.6–2.6)
MCHC RBC-ENTMCNC: 29.5 PG — SIGNIFICANT CHANGE UP (ref 27–34)
MCHC RBC-ENTMCNC: 32.9 GM/DL — SIGNIFICANT CHANGE UP (ref 32–36)
MCV RBC AUTO: 89.5 FL — SIGNIFICANT CHANGE UP (ref 80–100)
NRBC # BLD: 0 /100 WBCS — SIGNIFICANT CHANGE UP (ref 0–0)
PLATELET # BLD AUTO: 97 K/UL — LOW (ref 150–400)
POTASSIUM SERPL-MCNC: 4.2 MMOL/L — SIGNIFICANT CHANGE UP (ref 3.5–5.3)
POTASSIUM SERPL-SCNC: 4.2 MMOL/L — SIGNIFICANT CHANGE UP (ref 3.5–5.3)
PROT SERPL-MCNC: 5.6 G/DL — LOW (ref 6–8.3)
PROTHROM AB SERPL-ACNC: 15 SEC — HIGH (ref 10.6–13.6)
RBC # BLD: 2.85 M/UL — LOW (ref 4.2–5.8)
RBC # FLD: 13.2 % — SIGNIFICANT CHANGE UP (ref 10.3–14.5)
SARS-COV-2 IGG SERPL QL IA: NEGATIVE — SIGNIFICANT CHANGE UP
SARS-COV-2 IGM SERPL IA-ACNC: 0.09 INDEX — SIGNIFICANT CHANGE UP
SODIUM SERPL-SCNC: 133 MMOL/L — LOW (ref 135–145)
WBC # BLD: 13.56 K/UL — HIGH (ref 3.8–10.5)
WBC # FLD AUTO: 13.56 K/UL — HIGH (ref 3.8–10.5)

## 2020-11-24 PROCEDURE — 71045 X-RAY EXAM CHEST 1 VIEW: CPT | Mod: 26

## 2020-11-24 PROCEDURE — 93010 ELECTROCARDIOGRAM REPORT: CPT

## 2020-11-24 PROCEDURE — 99233 SBSQ HOSP IP/OBS HIGH 50: CPT

## 2020-11-24 RX ORDER — HYDROMORPHONE HYDROCHLORIDE 2 MG/ML
4 INJECTION INTRAMUSCULAR; INTRAVENOUS; SUBCUTANEOUS EVERY 4 HOURS
Refills: 0 | Status: DISCONTINUED | OUTPATIENT
Start: 2020-11-24 | End: 2020-11-27

## 2020-11-24 RX ORDER — OXYCODONE HYDROCHLORIDE 5 MG/1
5 TABLET ORAL
Refills: 0 | Status: DISCONTINUED | OUTPATIENT
Start: 2020-11-24 | End: 2020-11-24

## 2020-11-24 RX ORDER — WARFARIN SODIUM 2.5 MG/1
5 TABLET ORAL ONCE
Refills: 0 | Status: COMPLETED | OUTPATIENT
Start: 2020-11-24 | End: 2020-11-24

## 2020-11-24 RX ORDER — HYDROMORPHONE HYDROCHLORIDE 2 MG/ML
2 INJECTION INTRAMUSCULAR; INTRAVENOUS; SUBCUTANEOUS
Refills: 0 | Status: DISCONTINUED | OUTPATIENT
Start: 2020-11-24 | End: 2020-11-27

## 2020-11-24 RX ADMIN — HYDROMORPHONE HYDROCHLORIDE 4 MILLIGRAM(S): 2 INJECTION INTRAMUSCULAR; INTRAVENOUS; SUBCUTANEOUS at 21:10

## 2020-11-24 RX ADMIN — HYDROMORPHONE HYDROCHLORIDE 30 MILLILITER(S): 2 INJECTION INTRAMUSCULAR; INTRAVENOUS; SUBCUTANEOUS at 07:06

## 2020-11-24 RX ADMIN — SODIUM CHLORIDE 3 MILLILITER(S): 9 INJECTION INTRAMUSCULAR; INTRAVENOUS; SUBCUTANEOUS at 21:00

## 2020-11-24 RX ADMIN — Medication 10 MILLIGRAM(S): at 21:10

## 2020-11-24 RX ADMIN — HYDROMORPHONE HYDROCHLORIDE 4 MILLIGRAM(S): 2 INJECTION INTRAMUSCULAR; INTRAVENOUS; SUBCUTANEOUS at 17:50

## 2020-11-24 RX ADMIN — ATORVASTATIN CALCIUM 40 MILLIGRAM(S): 80 TABLET, FILM COATED ORAL at 21:10

## 2020-11-24 RX ADMIN — Medication 10 MILLIGRAM(S): at 06:07

## 2020-11-24 RX ADMIN — HYDROMORPHONE HYDROCHLORIDE 4 MILLIGRAM(S): 2 INJECTION INTRAMUSCULAR; INTRAVENOUS; SUBCUTANEOUS at 21:40

## 2020-11-24 RX ADMIN — Medication 5 MILLIGRAM(S): at 21:10

## 2020-11-24 RX ADMIN — WARFARIN SODIUM 5 MILLIGRAM(S): 2.5 TABLET ORAL at 21:10

## 2020-11-24 RX ADMIN — ENOXAPARIN SODIUM 40 MILLIGRAM(S): 100 INJECTION SUBCUTANEOUS at 12:27

## 2020-11-24 RX ADMIN — SODIUM CHLORIDE 3 MILLILITER(S): 9 INJECTION INTRAMUSCULAR; INTRAVENOUS; SUBCUTANEOUS at 06:06

## 2020-11-24 RX ADMIN — Medication 81 MILLIGRAM(S): at 12:26

## 2020-11-24 RX ADMIN — Medication 10 MILLIGRAM(S): at 14:29

## 2020-11-24 RX ADMIN — SODIUM CHLORIDE 3 MILLILITER(S): 9 INJECTION INTRAMUSCULAR; INTRAVENOUS; SUBCUTANEOUS at 14:29
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
## 2020-11-24 NOTE — PROGRESS NOTE ADULT - PROBLEM SELECTOR PLAN 1
Patient continues to have AV dissociation on telemetry. Will monitor on telemetry. If CHB does not resolve after 5 day post-operative period, will consider PPM. Patient is now in sinus rhythm. Will continue to monitor on telemetry. If CHB recurs, will consider PPM on Friday.  - Will continue to follow patient with you.

## 2020-11-24 NOTE — PROGRESS NOTE ADULT - SUBJECTIVE AND OBJECTIVE BOX
Cardiovascular Disease Progress Note    Overnight events: No acute events overnight. Mr. Schaefer says the pain is controlled. No SOB.   Otherwise review of systems negative    Objective Findings:  T(C): 36.7 (11-24-20 @ 05:11), Max: 37 (11-23-20 @ 08:00)  HR: 60 (11-24-20 @ 05:11) (59 - 79)  BP: 106/68 (11-24-20 @ 05:11) (106/68 - 120/63)  RR: 18 (11-24-20 @ 05:11) (15 - 18)  SpO2: 95% (11-24-20 @ 05:11) (94% - 98%)  Wt(kg): --  Daily     Daily       Physical Exam:  Gen: NAD; Patient resting comfortably  HEENT: EOMI, Normocephalic/ atraumatic  CV: RRR, normal S1 + S2, no m/r/g  Lungs:  Normal respiratory effort; clear to auscultation bilaterally  Abd: soft, non-tender; bowel sounds present  Ext: No edema; warm and well perfused    Telemetry: Complete heart block; V-pacing on demand    Laboratory Data:                        8.4    13.56 )-----------( 97       ( 24 Nov 2020 06:42 )             25.5     11-23    134<L>  |  102  |  14  ----------------------------<  120<H>  3.9   |  23  |  0.93    Ca    8.1<L>      23 Nov 2020 00:47  Phos  2.1     11-23  Mg     1.8     11-23    TPro  5.1<L>  /  Alb  3.1<L>  /  TBili  0.9  /  DBili  x   /  AST  57<H>  /  ALT  48<H>  /  AlkPhos  27<L>  11-23    PT/INR - ( 24 Nov 2020 06:41 )   PT: 15.0 sec;   INR: 1.26 ratio         PTT - ( 23 Nov 2020 14:24 )  PTT:28.8 sec          Inpatient Medications:  MEDICATIONS  (STANDING):  aspirin enteric coated 81 milliGRAM(s) Oral daily  atorvastatin 40 milliGRAM(s) Oral at bedtime  enoxaparin Injectable 40 milliGRAM(s) SubCutaneous daily  HYDROmorphone PCA (1 mG/mL) 30 milliLiter(s) PCA Continuous PCA Continuous  melatonin 5 milliGRAM(s) Oral at bedtime  metoclopramide 10 milliGRAM(s) Oral every 8 hours  polyethylene glycol 3350 17 Gram(s) Oral daily  sodium chloride 0.9% lock flush 3 milliLiter(s) IV Push every 8 hours      Assessment: 60 year old man with HTN, CAD and mitral valve prolapse with severe regurgitation presents with SOB.    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  Status post MV repair with WILBER ligation on 11/21.  Warfarin dosing for goal INR 2-3.     #Complete heart block-  Post-operatively.  V-pacing on demand.   Continue to observe for AV arlene recovery.     #CAD-  60% RCA lesion.  S/p CABG x 1 with SVG to RCA.  ASA and statin.     Rest of care as per CT surgery team.     Over 25 minutes spent on total encounter; more than 50% of the visit was spent counseling and/or coordinating care by the attending physician.      Luís Gold MD [facility]  Cardiovascular Disease  [phone]

## 2020-11-24 NOTE — CONSULT NOTE ADULT - ASSESSMENT
# CAD S/P CABG off Vasopressors Continue ASA 81 mg QD & Atorvastatin 40 mg qhs   Continue off AV nodals for now 2/2 junctional rhythm     #s/p MVR on coumadin    #Junctional Rythm Per EP junctional rhythm, no evidence of AV block - no indication for PPM at this time  Continue to monitor off AV nodals     # Hepatitis C Heptalogy following.

## 2020-11-24 NOTE — PROGRESS NOTE ADULT - SUBJECTIVE AND OBJECTIVE BOX
INTERVAL HPI/OVERNIGHT EVENTS: No acute events overnight. On telemetry, patient continues to have AV dissociation, also appears to have 1st degree AV block. Denies CP. Now on RA.     MEDICATIONS  (STANDING):  aspirin enteric coated 81 milliGRAM(s) Oral daily  atorvastatin 40 milliGRAM(s) Oral at bedtime  enoxaparin Injectable 40 milliGRAM(s) SubCutaneous daily  melatonin 5 milliGRAM(s) Oral at bedtime  metoclopramide 10 milliGRAM(s) Oral every 8 hours  polyethylene glycol 3350 17 Gram(s) Oral daily  sodium chloride 0.9% lock flush 3 milliLiter(s) IV Push every 8 hours    MEDICATIONS  (PRN):  HYDROmorphone   Tablet 2 milliGRAM(s) Oral every 3 hours PRN Moderate Pain (4 - 6)  HYDROmorphone   Tablet 4 milliGRAM(s) Oral every 4 hours PRN Severe Pain (7 - 10)      Allergies    No Known Allergies    Intolerances    Vital Signs Last 24 Hrs  T(C): 36.7 (24 Nov 2020 05:11), Max: 37 (23 Nov 2020 20:34)  T(F): 98.1 (24 Nov 2020 05:11), Max: 98.6 (23 Nov 2020 20:34)  HR: 60 (24 Nov 2020 05:11) (59 - 60)  BP: 106/68 (24 Nov 2020 05:11) (106/68 - 120/63)  BP(mean): --  RR: 18 (24 Nov 2020 05:11) (17 - 18)  SpO2: 95% (24 Nov 2020 05:11) (95% - 98%)    PHYSICAL EXAM:  GENERAL: NAD, well-developed, pleasant middle-aged man  HEAD:  Atraumatic, Normocephalic  EYES: EOMI, conjunctiva and sclera clear  NECK: Supple  CHEST/LUNG: Clear to auscultation bilaterally; No wheeze, ronchi or rales, post-sternotomy dressing  HEART: Regular rate and rhythm; No murmurs, rubs, or gallops  ABDOMEN: Soft, Nontender, Nondistended; Bowel sounds present  EXTREMITIES:  2+ Peripheral Pulses, No clubbing, cyanosis, or edema  PSYCH: AAOx3  NEUROLOGY: non-focal  SKIN: No rashes or lesions      LABS:                        8.4    13.56 )-----------( 97       ( 24 Nov 2020 06:42 )             25.5     11-24    133<L>  |  99  |  17  ----------------------------<  89  4.2   |  23  |  1.02    Ca    8.7      24 Nov 2020 06:42  Phos  2.1     11-23  Mg     2.0     11-24    TPro  5.6<L>  /  Alb  3.4  /  TBili  1.6<H>  /  DBili  x   /  AST  42<H>  /  ALT  41  /  AlkPhos  36<L>  11-24    PT/INR - ( 24 Nov 2020 06:41 )   PT: 15.0 sec;   INR: 1.26 ratio         PTT - ( 23 Nov 2020 14:24 )  PTT:28.8 sec      RADIOLOGY & ADDITIONAL TESTS:    TELE: AV dissociation and 1st degree block    EKG: from 11/23: junctional rhythm INTERVAL HPI/OVERNIGHT EVENTS: No acute events overnight. On telemetry, in AM patient had AV dissociation. More recently, he's in sinus rhythm. Denies CP. Now on RA.     MEDICATIONS  (STANDING):  aspirin enteric coated 81 milliGRAM(s) Oral daily  atorvastatin 40 milliGRAM(s) Oral at bedtime  enoxaparin Injectable 40 milliGRAM(s) SubCutaneous daily  melatonin 5 milliGRAM(s) Oral at bedtime  metoclopramide 10 milliGRAM(s) Oral every 8 hours  polyethylene glycol 3350 17 Gram(s) Oral daily  sodium chloride 0.9% lock flush 3 milliLiter(s) IV Push every 8 hours    MEDICATIONS  (PRN):  HYDROmorphone   Tablet 2 milliGRAM(s) Oral every 3 hours PRN Moderate Pain (4 - 6)  HYDROmorphone   Tablet 4 milliGRAM(s) Oral every 4 hours PRN Severe Pain (7 - 10)      Allergies    No Known Allergies    Intolerances    Vital Signs Last 24 Hrs  T(C): 36.7 (24 Nov 2020 05:11), Max: 37 (23 Nov 2020 20:34)  T(F): 98.1 (24 Nov 2020 05:11), Max: 98.6 (23 Nov 2020 20:34)  HR: 60 (24 Nov 2020 05:11) (59 - 60)  BP: 106/68 (24 Nov 2020 05:11) (106/68 - 120/63)  BP(mean): --  RR: 18 (24 Nov 2020 05:11) (17 - 18)  SpO2: 95% (24 Nov 2020 05:11) (95% - 98%)    PHYSICAL EXAM:  GENERAL: NAD, well-developed, pleasant middle-aged man  HEAD:  Atraumatic, Normocephalic  EYES: EOMI, conjunctiva and sclera clear  NECK: Supple  CHEST/LUNG: Clear to auscultation bilaterally; No wheeze, ronchi or rales, post-sternotomy dressing  HEART: Regular rate and rhythm; No murmurs, rubs, or gallops  ABDOMEN: Soft, Nontender, Nondistended; Bowel sounds present  EXTREMITIES:  2+ Peripheral Pulses, No clubbing, cyanosis, or edema  PSYCH: AAOx3  NEUROLOGY: non-focal  SKIN: No rashes or lesions      LABS:                        8.4    13.56 )-----------( 97       ( 24 Nov 2020 06:42 )             25.5     11-24    133<L>  |  99  |  17  ----------------------------<  89  4.2   |  23  |  1.02    Ca    8.7      24 Nov 2020 06:42  Phos  2.1     11-23  Mg     2.0     11-24    TPro  5.6<L>  /  Alb  3.4  /  TBili  1.6<H>  /  DBili  x   /  AST  42<H>  /  ALT  41  /  AlkPhos  36<L>  11-24    PT/INR - ( 24 Nov 2020 06:41 )   PT: 15.0 sec;   INR: 1.26 ratio         PTT - ( 23 Nov 2020 14:24 )  PTT:28.8 sec      RADIOLOGY & ADDITIONAL TESTS:    TELE: AV dissociation and 1st degree block    EKG: from 11/23: junctional rhythm

## 2020-11-24 NOTE — PROGRESS NOTE ADULT - ASSESSMENT
59 y/o M with HTN, CAD with CABG x 1 (saphenous vein graft to RCA for 60% stenosis), MVP with severe MR s/p MVR on 11/21 with prosthetic ring, now POD# 3. EP consulted for EKG showing junctional rhythm with underlying complete heart block.
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
## 2020-11-24 NOTE — PROGRESS NOTE ADULT - SUBJECTIVE AND OBJECTIVE BOX
im ok   VITAL SIGNS    Telemetry:  av diss, wenckebach, occ paced.    Vital Signs Last 24 Hrs  T(C): 36.7 (20 @ 05:11), Max: 37 (20 @ 20:34)  T(F): 98.1 (20 @ 05:11), Max: 98.6 (20 @ 20:34)  HR: 60 (20 @ 05:11) (59 - 60)  BP: 106/68 (20 @ 05:11) (106/68 - 120/63)  RR: 18 (20 @ 05:11) (17 - 18)  SpO2: 95% (20 @ 05:11) (95% - 98%)                    @ 07:01  -   @ 07:00  --------------------------------------------------------  IN: 742.5 mL / OUT: 300 mL / NET: 442.5 mL     @ 07:01  -   @ 12:40  --------------------------------------------------------  IN: 240 mL / OUT: 100 mL / NET: 140 mL          Daily     Daily Weight in k.1 (2020 09:04)            CAPILLARY BLOOD GLUCOSE                Drains:      Pacing Wires        [x vi 56  ]   Settings:                                  Isolated  [  ]    Coumadin    [ ] YES          [x  ]      NO                                   PHYSICAL EXAM        Neurology: alert and oriented x 3, nonfocal, no gross deficits  CV : s1 s2 RRR  Sternal Wound :  CDI , Stable  Lungs: cta  Abdomen: soft, nontender, nondistended, positive bowel sounds, last bowel movement +                        :    voiding       Extremities: neg      edema   /  -   calve tenderness ,    R leg  incisions cdi          aspirin enteric coated 81 milliGRAM(s) Oral daily  atorvastatin 40 milliGRAM(s) Oral at bedtime  enoxaparin Injectable 40 milliGRAM(s) SubCutaneous daily  HYDROmorphone   Tablet 2 milliGRAM(s) Oral every 3 hours PRN  HYDROmorphone   Tablet 4 milliGRAM(s) Oral every 4 hours PRN  melatonin 5 milliGRAM(s) Oral at bedtime  metoclopramide 10 milliGRAM(s) Oral every 8 hours  polyethylene glycol 3350 17 Gram(s) Oral daily  sodium chloride 0.9% lock flush 3 milliLiter(s) IV Push every 8 hours                    Physical Therapy Rec:   Home  [  ]   Home w/ PT  [  ]  Rehab  [  ]  Discussed with Cardiothoracic Team at AM rounds.

## 2020-11-24 NOTE — PROGRESS NOTE ADULT - SUBJECTIVE AND OBJECTIVE BOX
Day 3\2 of Anesthesia Pain Management Service    SUBJECTIVE: Doing well    Pain Scale Score:	[X] Refer to charted pain scores    THERAPY:    [ ] IV PCA Morphine		        [ ] 5 mg/mL	[ ] 1 mg/mL  [X] IV PCA Hydromorphone	[ ] 5 mg/mL	[X] 1 mg/mL  [ ] IV PCA Fentanyl		        [ ] 50 micrograms/mL    Demand dose: 0.3 mg     Lockout: 6 minutes   Continuous Rate: 0 mg/hr  4 Hour Limit: 4 mg    MEDICATIONS  (STANDING):  aspirin enteric coated 81 milliGRAM(s) Oral daily  atorvastatin 40 milliGRAM(s) Oral at bedtime  enoxaparin Injectable 40 milliGRAM(s) SubCutaneous daily  melatonin 5 milliGRAM(s) Oral at bedtime  metoclopramide 10 milliGRAM(s) Oral every 8 hours  polyethylene glycol 3350 17 Gram(s) Oral daily  sodium chloride 0.9% lock flush 3 milliLiter(s) IV Push every 8 hours    MEDICATIONS  (PRN):  oxyCODONE    IR 5 milliGRAM(s) Oral every 3 hours PRN Moderate Pain (4 - 6)      OBJECTIVE:    Sedation Score:	[ X] Alert	 [ ] Drowsy 	[ ] Arousable	[ ] Asleep	[ ] Unresponsive    Side Effects:	[X ] None	[ ] Nausea	[ ] Vomiting	[ ] Pruritus  		[ ] Other:    Vital Signs Last 24 Hrs  T(C): 36.7 (24 Nov 2020 05:11), Max: 37 (23 Nov 2020 11:00)  T(F): 98.1 (24 Nov 2020 05:11), Max: 98.6 (23 Nov 2020 11:00)  HR: 60 (24 Nov 2020 05:11) (59 - 66)  BP: 106/68 (24 Nov 2020 05:11) (106/68 - 120/63)  BP(mean): --  RR: 18 (24 Nov 2020 05:11) (15 - 18)  SpO2: 95% (24 Nov 2020 05:11) (94% - 98%)    ASSESSMENT/ PLAN    Therapy to  be:               [  ] Continued   [X ] Discontinued   [ X] Changed to PRN Analgesics    Documentation and Verification of current medications:   [X] Done	[ ] Not done, not eligible    Comments: D\C PCA and transition to prn analgesics

## 2020-11-25 LAB
ANION GAP SERPL CALC-SCNC: 11 MMOL/L — SIGNIFICANT CHANGE UP (ref 5–17)
APTT BLD: 28.8 SEC — SIGNIFICANT CHANGE UP (ref 27.5–35.5)
BLD GP AB SCN SERPL QL: NEGATIVE — SIGNIFICANT CHANGE UP
BUN SERPL-MCNC: 17 MG/DL — SIGNIFICANT CHANGE UP (ref 7–23)
CALCIUM SERPL-MCNC: 8.1 MG/DL — LOW (ref 8.4–10.5)
CHLORIDE SERPL-SCNC: 99 MMOL/L — SIGNIFICANT CHANGE UP (ref 96–108)
CO2 SERPL-SCNC: 25 MMOL/L — SIGNIFICANT CHANGE UP (ref 22–31)
CREAT SERPL-MCNC: 0.97 MG/DL — SIGNIFICANT CHANGE UP (ref 0.5–1.3)
GLUCOSE SERPL-MCNC: 85 MG/DL — SIGNIFICANT CHANGE UP (ref 70–99)
HCT VFR BLD CALC: 22.6 % — LOW (ref 39–50)
HCT VFR BLD CALC: 23.9 % — LOW (ref 39–50)
HGB BLD-MCNC: 7.7 G/DL — LOW (ref 13–17)
HGB BLD-MCNC: 7.9 G/DL — LOW (ref 13–17)
INR BLD: 1.42 RATIO — HIGH (ref 0.88–1.16)
MCHC RBC-ENTMCNC: 29.8 PG — SIGNIFICANT CHANGE UP (ref 27–34)
MCHC RBC-ENTMCNC: 34.1 GM/DL — SIGNIFICANT CHANGE UP (ref 32–36)
MCV RBC AUTO: 87.6 FL — SIGNIFICANT CHANGE UP (ref 80–100)
NRBC # BLD: 0 /100 WBCS — SIGNIFICANT CHANGE UP (ref 0–0)
PLATELET # BLD AUTO: 126 K/UL — LOW (ref 150–400)
POTASSIUM SERPL-MCNC: 3.8 MMOL/L — SIGNIFICANT CHANGE UP (ref 3.5–5.3)
POTASSIUM SERPL-SCNC: 3.8 MMOL/L — SIGNIFICANT CHANGE UP (ref 3.5–5.3)
PROTHROM AB SERPL-ACNC: 16.8 SEC — HIGH (ref 10.6–13.6)
RBC # BLD: 2.58 M/UL — LOW (ref 4.2–5.8)
RBC # FLD: 13.5 % — SIGNIFICANT CHANGE UP (ref 10.3–14.5)
RH IG SCN BLD-IMP: POSITIVE — SIGNIFICANT CHANGE UP
SODIUM SERPL-SCNC: 135 MMOL/L — SIGNIFICANT CHANGE UP (ref 135–145)
WBC # BLD: 9.97 K/UL — SIGNIFICANT CHANGE UP (ref 3.8–10.5)
WBC # FLD AUTO: 9.97 K/UL — SIGNIFICANT CHANGE UP (ref 3.8–10.5)

## 2020-11-25 PROCEDURE — 93010 ELECTROCARDIOGRAM REPORT: CPT | Mod: 77

## 2020-11-25 PROCEDURE — 93010 ELECTROCARDIOGRAM REPORT: CPT

## 2020-11-25 PROCEDURE — 99232 SBSQ HOSP IP/OBS MODERATE 35: CPT

## 2020-11-25 PROCEDURE — 71045 X-RAY EXAM CHEST 1 VIEW: CPT | Mod: 26

## 2020-11-25 RX ORDER — WARFARIN SODIUM 2.5 MG/1
7.5 TABLET ORAL ONCE
Refills: 0 | Status: DISCONTINUED | OUTPATIENT
Start: 2020-11-25 | End: 2020-11-25

## 2020-11-25 RX ORDER — ACETAMINOPHEN 500 MG
1000 TABLET ORAL ONCE
Refills: 0 | Status: COMPLETED | OUTPATIENT
Start: 2020-11-25 | End: 2020-11-25

## 2020-11-25 RX ORDER — WARFARIN SODIUM 2.5 MG/1
5 TABLET ORAL ONCE
Refills: 0 | Status: DISCONTINUED | OUTPATIENT
Start: 2020-11-25 | End: 2020-11-25

## 2020-11-25 RX ORDER — ASCORBIC ACID 60 MG
500 TABLET,CHEWABLE ORAL DAILY
Refills: 0 | Status: DISCONTINUED | OUTPATIENT
Start: 2020-11-25 | End: 2020-11-27

## 2020-11-25 RX ORDER — FOLIC ACID 0.8 MG
1 TABLET ORAL DAILY
Refills: 0 | Status: DISCONTINUED | OUTPATIENT
Start: 2020-11-25 | End: 2020-11-27

## 2020-11-25 RX ORDER — POTASSIUM CHLORIDE 20 MEQ
20 PACKET (EA) ORAL ONCE
Refills: 0 | Status: COMPLETED | OUTPATIENT
Start: 2020-11-25 | End: 2020-11-25

## 2020-11-25 RX ORDER — FERROUS SULFATE 325(65) MG
325 TABLET ORAL THREE TIMES A DAY
Refills: 0 | Status: DISCONTINUED | OUTPATIENT
Start: 2020-11-25 | End: 2020-11-27

## 2020-11-25 RX ORDER — WARFARIN SODIUM 2.5 MG/1
7.5 TABLET ORAL ONCE
Refills: 0 | Status: COMPLETED | OUTPATIENT
Start: 2020-11-25 | End: 2020-11-25

## 2020-11-25 RX ADMIN — Medication 10 MILLIGRAM(S): at 22:42

## 2020-11-25 RX ADMIN — HYDROMORPHONE HYDROCHLORIDE 4 MILLIGRAM(S): 2 INJECTION INTRAMUSCULAR; INTRAVENOUS; SUBCUTANEOUS at 18:48

## 2020-11-25 RX ADMIN — HYDROMORPHONE HYDROCHLORIDE 4 MILLIGRAM(S): 2 INJECTION INTRAMUSCULAR; INTRAVENOUS; SUBCUTANEOUS at 13:39

## 2020-11-25 RX ADMIN — Medication 400 MILLIGRAM(S): at 20:21

## 2020-11-25 RX ADMIN — Medication 1 MILLIGRAM(S): at 15:27

## 2020-11-25 RX ADMIN — Medication 1000 MILLIGRAM(S): at 20:36

## 2020-11-25 RX ADMIN — Medication 5 MILLIGRAM(S): at 22:42

## 2020-11-25 RX ADMIN — HYDROMORPHONE HYDROCHLORIDE 4 MILLIGRAM(S): 2 INJECTION INTRAMUSCULAR; INTRAVENOUS; SUBCUTANEOUS at 22:42

## 2020-11-25 RX ADMIN — Medication 10 MILLIGRAM(S): at 05:45

## 2020-11-25 RX ADMIN — HYDROMORPHONE HYDROCHLORIDE 4 MILLIGRAM(S): 2 INJECTION INTRAMUSCULAR; INTRAVENOUS; SUBCUTANEOUS at 04:46

## 2020-11-25 RX ADMIN — Medication 20 MILLIEQUIVALENT(S): at 16:54

## 2020-11-25 RX ADMIN — Medication 325 MILLIGRAM(S): at 22:42

## 2020-11-25 RX ADMIN — HYDROMORPHONE HYDROCHLORIDE 4 MILLIGRAM(S): 2 INJECTION INTRAMUSCULAR; INTRAVENOUS; SUBCUTANEOUS at 23:15

## 2020-11-25 RX ADMIN — Medication 500 MILLIGRAM(S): at 15:27

## 2020-11-25 RX ADMIN — HYDROMORPHONE HYDROCHLORIDE 4 MILLIGRAM(S): 2 INJECTION INTRAMUSCULAR; INTRAVENOUS; SUBCUTANEOUS at 09:50

## 2020-11-25 RX ADMIN — WARFARIN SODIUM 7.5 MILLIGRAM(S): 2.5 TABLET ORAL at 22:42

## 2020-11-25 RX ADMIN — SODIUM CHLORIDE 3 MILLILITER(S): 9 INJECTION INTRAMUSCULAR; INTRAVENOUS; SUBCUTANEOUS at 05:44

## 2020-11-25 RX ADMIN — HYDROMORPHONE HYDROCHLORIDE 4 MILLIGRAM(S): 2 INJECTION INTRAMUSCULAR; INTRAVENOUS; SUBCUTANEOUS at 05:16

## 2020-11-25 RX ADMIN — SODIUM CHLORIDE 3 MILLILITER(S): 9 INJECTION INTRAMUSCULAR; INTRAVENOUS; SUBCUTANEOUS at 11:29

## 2020-11-25 RX ADMIN — Medication 81 MILLIGRAM(S): at 11:14

## 2020-11-25 RX ADMIN — HYDROMORPHONE HYDROCHLORIDE 2 MILLIGRAM(S): 2 INJECTION INTRAMUSCULAR; INTRAVENOUS; SUBCUTANEOUS at 15:27

## 2020-11-25 RX ADMIN — HYDROMORPHONE HYDROCHLORIDE 4 MILLIGRAM(S): 2 INJECTION INTRAMUSCULAR; INTRAVENOUS; SUBCUTANEOUS at 18:18

## 2020-11-25 RX ADMIN — HYDROMORPHONE HYDROCHLORIDE 4 MILLIGRAM(S): 2 INJECTION INTRAMUSCULAR; INTRAVENOUS; SUBCUTANEOUS at 09:19

## 2020-11-25 RX ADMIN — ATORVASTATIN CALCIUM 40 MILLIGRAM(S): 80 TABLET, FILM COATED ORAL at 22:42

## 2020-11-25 RX ADMIN — HYDROMORPHONE HYDROCHLORIDE 2 MILLIGRAM(S): 2 INJECTION INTRAMUSCULAR; INTRAVENOUS; SUBCUTANEOUS at 15:59

## 2020-11-25 RX ADMIN — HYDROMORPHONE HYDROCHLORIDE 4 MILLIGRAM(S): 2 INJECTION INTRAMUSCULAR; INTRAVENOUS; SUBCUTANEOUS at 14:09

## 2020-11-25 RX ADMIN — Medication 325 MILLIGRAM(S): at 15:27

## 2020-11-25 RX ADMIN — SODIUM CHLORIDE 3 MILLILITER(S): 9 INJECTION INTRAMUSCULAR; INTRAVENOUS; SUBCUTANEOUS at 22:43

## 2020-11-25 RX ADMIN — ENOXAPARIN SODIUM 40 MILLIGRAM(S): 100 INJECTION SUBCUTANEOUS at 11:14

## 2020-11-25 NOTE — DIETITIAN INITIAL EVALUATION ADULT. - ETIOLOGY
increased physiological needs, excessive alcohol intake lack or prior exposure to coumadin education

## 2020-11-25 NOTE — PROGRESS NOTE ADULT - SUBJECTIVE AND OBJECTIVE BOX
INTERVAL HPI/OVERNIGHT EVENTS:    MEDICATIONS  (STANDING):  aspirin enteric coated 81 milliGRAM(s) Oral daily  atorvastatin 40 milliGRAM(s) Oral at bedtime  enoxaparin Injectable 40 milliGRAM(s) SubCutaneous daily  melatonin 5 milliGRAM(s) Oral at bedtime  metoclopramide 10 milliGRAM(s) Oral every 8 hours  polyethylene glycol 3350 17 Gram(s) Oral daily  sodium chloride 0.9% lock flush 3 milliLiter(s) IV Push every 8 hours    MEDICATIONS  (PRN):  HYDROmorphone   Tablet 2 milliGRAM(s) Oral every 3 hours PRN Moderate Pain (4 - 6)  HYDROmorphone   Tablet 4 milliGRAM(s) Oral every 4 hours PRN Severe Pain (7 - 10)      Allergies    No Known Allergies    Intolerances      ROS:      Vital Signs Last 24 Hrs  T(C): 37.1 (25 Nov 2020 04:43), Max: 37.1 (25 Nov 2020 04:43)  T(F): 98.8 (25 Nov 2020 04:43), Max: 98.8 (25 Nov 2020 04:43)  HR: 83 (25 Nov 2020 04:43) (79 - 86)  BP: 123/81 (25 Nov 2020 04:43) (113/76 - 130/83)  BP(mean): 89 (24 Nov 2020 20:29) (89 - 89)  RR: 18 (25 Nov 2020 04:43) (18 - 18)  SpO2: 97% (25 Nov 2020 04:43) (92% - 100%)    Physical Exam:        LABS:                        7.7    9.97  )-----------( 126      ( 25 Nov 2020 06:37 )             22.6     11-25    135  |  99  |  17  ----------------------------<  85  3.8   |  25  |  0.97    Ca    8.1<L>      25 Nov 2020 06:37  Mg     2.0     11-24    TPro  5.6<L>  /  Alb  3.4  /  TBili  1.6<H>  /  DBili  x   /  AST  42<H>  /  ALT  41  /  AlkPhos  36<L>  11-24    PT/INR - ( 25 Nov 2020 07:25 )   PT: 16.8 sec;   INR: 1.42 ratio         PTT - ( 25 Nov 2020 07:25 )  PTT:28.8 sec      RADIOLOGY & ADDITIONAL TESTS:    TELE:    EKG:   INTERVAL HPI/OVERNIGHT EVENTS: Patient converted to sinus rhythm yesterday evening. Still remains in sinus, on telemetry. Patient denies CP, SOB or palpitations.    MEDICATIONS  (STANDING):  aspirin enteric coated 81 milliGRAM(s) Oral daily  atorvastatin 40 milliGRAM(s) Oral at bedtime  enoxaparin Injectable 40 milliGRAM(s) SubCutaneous daily  melatonin 5 milliGRAM(s) Oral at bedtime  metoclopramide 10 milliGRAM(s) Oral every 8 hours  polyethylene glycol 3350 17 Gram(s) Oral daily  sodium chloride 0.9% lock flush 3 milliLiter(s) IV Push every 8 hours    MEDICATIONS  (PRN):  HYDROmorphone   Tablet 2 milliGRAM(s) Oral every 3 hours PRN Moderate Pain (4 - 6)  HYDROmorphone   Tablet 4 milliGRAM(s) Oral every 4 hours PRN Severe Pain (7 - 10)      Allergies    No Known Allergies    Intolerances    REVIEW OF SYSTEMS:  CONSTITUTIONAL: No weakness, fevers or chills  EYES/ENT: No visual changes;  No vertigo or throat pain   NECK: No pain or stiffness  RESPIRATORY: No cough, wheezing, hemoptysis; No shortness of breath  CARDIOVASCULAR: No chest pain or palpitations  GASTROINTESTINAL: No abdominal or epigastric pain. No nausea, vomiting, or hematemesis; No diarrhea or constipation. No melena or hematochezia.  GENITOURINARY: No dysuria, frequency or hematuria  NEUROLOGICAL: No numbness or weakness  SKIN: No itching, rashes      Vital Signs Last 24 Hrs  T(C): 37.1 (25 Nov 2020 04:43), Max: 37.1 (25 Nov 2020 04:43)  T(F): 98.8 (25 Nov 2020 04:43), Max: 98.8 (25 Nov 2020 04:43)  HR: 83 (25 Nov 2020 04:43) (79 - 86)  BP: 123/81 (25 Nov 2020 04:43) (113/76 - 130/83)  BP(mean): 89 (24 Nov 2020 20:29) (89 - 89)  RR: 18 (25 Nov 2020 04:43) (18 - 18)  SpO2: 97% (25 Nov 2020 04:43) (92% - 100%)    PHYSICAL EXAM:  GENERAL: NAD, well-developed  HEAD:  Atraumatic, Normocephalic  EYES: EOMI, conjunctiva and sclera clear  NECK: Supple  CHEST/LUNG: Clear to auscultation bilaterally; No wheeze, ronchi or rales  HEART: Regular rate and rhythm; No murmurs, rubs, or gallops, post-sternotomy dressing present  ABDOMEN: Soft, Nontender, Nondistended; Bowel sounds present  EXTREMITIES:  2+ Peripheral Pulses, No clubbing, cyanosis, or edema  PSYCH: AAOx3  NEUROLOGY: non-focal  SKIN: No rashes or lesions      LABS:                        7.7    9.97  )-----------( 126      ( 25 Nov 2020 06:37 )             22.6     11-25    135  |  99  |  17  ----------------------------<  85  3.8   |  25  |  0.97    Ca    8.1<L>      25 Nov 2020 06:37  Mg     2.0     11-24    TPro  5.6<L>  /  Alb  3.4  /  TBili  1.6<H>  /  DBili  x   /  AST  42<H>  /  ALT  41  /  AlkPhos  36<L>  11-24    PT/INR - ( 25 Nov 2020 07:25 )   PT: 16.8 sec;   INR: 1.42 ratio         PTT - ( 25 Nov 2020 07:25 )  PTT:28.8 sec      RADIOLOGY & ADDITIONAL TESTS:    TELE: Normal sinus rhythm    EKG:
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
## 2020-11-25 NOTE — DIETITIAN INITIAL EVALUATION ADULT. - FLUID ACCUMULATION
edema +1 (left foot, generalized). edema +1 (left foot, generalized). Fluids deferred to medical team.

## 2020-11-25 NOTE — PROGRESS NOTE ADULT - PROBLEM SELECTOR PLAN 1
Resolved - Patient is now in sinus rhythm. Will continue to monitor on telemetry. If CHB recurs, will consider PPM.  - Will continue to follow patient with you.

## 2020-11-25 NOTE — PROGRESS NOTE ADULT - SUBJECTIVE AND OBJECTIVE BOX
For all Cardiology service contact information, go to [email or web address] and use "[email or web address]" to login.    SUBJECTIVE:   No events overnight. Denies CP, SOB or Dyspnea.   Telemetry reviewed. Sinus rhythm with 1 degree AV block at rates 75-80  -------------------------------------------------------------------------------------------  ROS:  CV: chest pain (-), palpitation (-), orthopnea (-), PND (-), edema (-)  PULM: SOB (-), cough (-), wheezing (-), hemoptysis (-).   CONST: fever (-), chills (-) or fatigue (-)  GI: abdominal distension (-), abdominal pain (-) , nausea/vomiting (-), hematemesis, (-), melena (-), hematochezia (-)  : dysuria (-), frequency (-), hematuria (-).   NEURO: numbness (-), weakness (-), dizziness (-)  SKIN: itching (-), rash (-)  HEENT:  visual changes (-); vertigo or throat pain (-);  neck stiffness (-)     All other review of systems is negative unless indicated above.   -------------------------------------------------------------------------------------------  VS:  T(F): 98.8 (11-25), Max: 98.8 (11-25)  HR: 83 (11-25) (79 - 86)  BP: 123/81 (11-25) (113/76 - 130/83)  RR: 18 (11-25)  SpO2: 97% (11-25)  I&O's Summary    24 Nov 2020 07:01  -  25 Nov 2020 07:00  --------------------------------------------------------  IN: 570 mL / OUT: 700 mL / NET: -130 mL      ------------------------------------------------------------------------------------------  PHYSICAL EXAM:  GENERAL: NAD  HEAD:  Atraumatic, Normocephalic.  EYES: EOMI, PERRLA, conjunctiva and sclera clear.  ENT: Moist mucous membranes.  NECK: Supple, No JVD.  CHEST/LUNG: Clear to auscultation bilaterally; No rales, rhonchi, wheezing, or rubs. Unlabored respirations.  HEART: Regular rate and rhythm; No murmurs, rubs, or gallops.  ABDOMEN: Bowel sounds present; Soft, Nontender, Nondistended.   EXTREMITIES:  2+ Peripheral Pulses, brisk capillary refill. No clubbing, cyanosis, or edema.  PSYCH: Normal affect.  SKIN: No rashes or lesions.  -------------------------------------------------------------------------------------------  LABS:                          7.7    9.97  )-----------( 126      ( 25 Nov 2020 06:37 )             22.6     11-25    135  |  99  |  17  ----------------------------<  85  3.8   |  25  |  0.97    Ca    8.1<L>      25 Nov 2020 06:37  Mg     2.0     11-24    TPro  5.6<L>  /  Alb  3.4  /  TBili  1.6<H>  /  DBili  x   /  AST  42<H>  /  ALT  41  /  AlkPhos  36<L>  11-24    PT/INR - ( 25 Nov 2020 07:25 )   PT: 16.8 sec;   INR: 1.42 ratio         PTT - ( 25 Nov 2020 07:25 )  PTT:28.8 sec  CARDIAC MARKERS ( 21 Nov 2020 12:39 )  713 ng/L / x     / x     / 557 U/L / x     / 63.2 ng/mL            -------------------------------------------------------------------------------------------  Meds:  aspirin enteric coated 81 milliGRAM(s) Oral daily  atorvastatin 40 milliGRAM(s) Oral at bedtime  enoxaparin Injectable 40 milliGRAM(s) SubCutaneous daily  HYDROmorphone   Tablet 2 milliGRAM(s) Oral every 3 hours PRN  HYDROmorphone   Tablet 4 milliGRAM(s) Oral every 4 hours PRN  melatonin 5 milliGRAM(s) Oral at bedtime  metoclopramide 10 milliGRAM(s) Oral every 8 hours  polyethylene glycol 3350 17 Gram(s) Oral daily  sodium chloride 0.9% lock flush 3 milliLiter(s) IV Push every 8 hours    -------------------------------------------------------------------------------------------

## 2020-11-25 NOTE — PROGRESS NOTE ADULT - ASSESSMENT
59 y/o M with HTN, CAD with CABG x 1 (saphenous vein graft to RCA for 60% stenosis), MVP with severe MR s/p MV repair on 11/21 with prosthetic ring, now POD# 4. EP consulted for EKG showing junctional rhythm with underlying complete heart block. Patient now converted to sinus rhythm.

## 2020-11-25 NOTE — DIETITIAN INITIAL EVALUATION ADULT. - SIGNS/SYMPTOMS
moderate muscle and fat depletion, +1 edema (left foot, generalized), 8.6% weight loss x 4 mo. new to coumadin use

## 2020-11-25 NOTE — PROGRESS NOTE ADULT - PROBLEM SELECTOR PLAN 4
Positive HCV serology with positive HCV PCR  Hepatology consulted - recoomend and US  pt refusing  Daily LFTs to trend

## 2020-11-25 NOTE — PROGRESS NOTE ADULT - ASSESSMENT
60M with HTN, CAD with CABG x 1 (saphenous vein graft to RCA for 60% stenosis), hx of MVP with severe MR s/p elective MVR on 11/21 with prosthetic ring c/b post-operative junctional tachycardia, POD# 4     Patient showing signs of conduction system recovery, currently in NSR with first degree AV block WA ~210. Continue to monitor on telemetry.

## 2020-11-25 NOTE — PROGRESS NOTE ADULT - SUBJECTIVE AND OBJECTIVE BOX
VITAL SIGNS    Telemetry:     sr   70    Vital Signs Last 24 Hrs  T(C): 37.1 (20 @ 04:43), Max: 37.1 (20 @ 04:43)  T(F): 98.8 (20 @ 04:43), Max: 98.8 (20 @ 04:43)  HR: 83 (20 @ 04:43) (79 - 86)  BP: 123/81 (20 @ 04:43) (113/76 - 130/83)  RR: 18 (20 @ 04:43) (18 - 18)  SpO2: 97% (20 @ 04:43) (92% - 100%)                   Daily     Daily Weight in k.4 (2020 08:12)        CAPILLARY BLOOD GLUCOSE         Pacing Wires        [  ]   Settings:                             Coumadin                        PHYSICAL EXAM    Neurology: alert and oriented x 3, moves all extremities with no defecits  CV :  RRR  Sternal Wound :  CDI , Stable +  pw  Lungs:   CTA B/L  Abdomen: soft, nontender, nondistended, positive bowel sounds, last bowel movement     Extremities:       trace pedal edema

## 2020-11-25 NOTE — DIETITIAN INITIAL EVALUATION ADULT. - OTHER CALCULATIONS
dosing weight: 169 lbs. used for energy, protein and fluid needs calculations. dosing weight: 169 lbs. used for energy, protein needs calculations.

## 2020-11-25 NOTE — PROGRESS NOTE ADULT - SUBJECTIVE AND OBJECTIVE BOX
Cardiovascular Disease Progress Note    Overnight events: No acute events overnight. Patient denies chest pain or SOB.     Otherwise review of systems negative    Objective Findings:  T(C): 37.1 (20 @ 04:43), Max: 37.1 (20 @ 04:43)  HR: 83 (20:43) (79 - 86)  BP: 123/81 (20 @ 04:43) (113/76 - 130/83)  RR: 18 (20:43) (18 - 18)  SpO2: 97% (20 @ 04:43) (92% - 100%)  Wt(kg): --  Daily     Daily Weight in k.1 (2020 09:04)      Physical Exam:  Gen: NAD; Patient resting comfortably  HEENT: EOMI, Normocephalic/ atraumatic  CV: RRR, normal S1 + S2, no m/r/g  Lungs:  Normal respiratory effort; clear to auscultation bilaterally  Abd: soft, non-tender; bowel sounds present  Ext: No edema; warm and well perfused    Telemetry: Sinus    Laboratory Data:                        8.4    13.56 )-----------( 97       ( 2020 06:42 )             25.5     11-    133<L>  |  99  |  17  ----------------------------<  89  4.2   |  23  |  1.02    Ca    8.7      2020 06:42  Mg     2.0         TPro  5.6<L>  /  Alb  3.4  /  TBili  1.6<H>  /  DBili  x   /  AST  42<H>  /  ALT  41  /  AlkPhos  36<L>  11-24    PT/INR - ( 2020 06:41 )   PT: 15.0 sec;   INR: 1.26 ratio         PTT - ( 2020 14:24 )  PTT:28.8 sec          Inpatient Medications:  MEDICATIONS  (STANDING):  aspirin enteric coated 81 milliGRAM(s) Oral daily  atorvastatin 40 milliGRAM(s) Oral at bedtime  enoxaparin Injectable 40 milliGRAM(s) SubCutaneous daily  melatonin 5 milliGRAM(s) Oral at bedtime  metoclopramide 10 milliGRAM(s) Oral every 8 hours  polyethylene glycol 3350 17 Gram(s) Oral daily  sodium chloride 0.9% lock flush 3 milliLiter(s) IV Push every 8 hours      Assessment: 60 year old man with HTN, CAD and mitral valve prolapse with severe regurgitation presents with SOB.    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  Status post MV repair with WILBER ligation on .  Warfarin dosing for goal INR 2-3.     #Complete heart block-  Post-operatively.  Conduction slowly improving.   Continue to observe for AV alrene recovery.     #CAD-  60% RCA lesion.  S/p CABG x 1 with SVG to RCA.  ASA and statin.     Rest of care as per CT surgery team.     Over 25 minutes spent on total encounter; more than 50% of the visit was spent counseling and/or coordinating care by the attending physician.      Luís Gold MD [facility]  Cardiovascular Disease  [phone]

## 2020-11-25 NOTE — DIETITIAN INITIAL EVALUATION ADULT. - REASON FOR ADMISSION
Patient has a significant PMHx for HTN, CAD, hepatitis C. S/P CABGx1 and MV repair w/ prosthetic ring 11/21. Currently on telemonitoring.

## 2020-11-25 NOTE — DIETITIAN INITIAL EVALUATION ADULT. - PHYSCIAL ASSESSMENT
well nourished Nutrition Focused Physical Exam conducted with patient verbal consent (see below). Nutrition Focused Physical Exam conducted with patient verbal consent (see below). No pressure injuries noted per flow sheet.

## 2020-11-25 NOTE — DIETITIAN INITIAL EVALUATION ADULT. - ORAL INTAKE PTA/DIET HISTORY
Patient reports fair PO intake and appetite PTA. Reports usually cooks meals himself and makes a variety of traditional/ flavorful dishes at home. States tries to keep and shape and exercises often, eats a diet high in protein and usually has 5 small frequent meals a day. States eats seafood options such as tuna, salmon etc. often. Patient admits to ETOH use PTA, per H&P 3-4 drinks x day. per patient drinks an excessive amount "can drink 24 pack easily in one setting".

## 2020-11-25 NOTE — DIETITIAN INITIAL EVALUATION ADULT. - OTHER INFO
Nutrition Supplement PTA: MVI   Food Allergies/ Intolerances: None noted     Patient UBW: states usually is around 185 lbs. Dosing weight: 169 lbs.     Daily weights noted as following per flow sheet: 169.3 lbs (11/20), 193.1 lbs (11/22), 183.4 (11/23), 180.4 (11/24), 179.4 lbs (11/25) Patient states he feels lost weight x4 months due to stress regarding condition/ cardiac issues. Dosing weight(169 lbs.) indicative of 16 lb. weight loss. Nutrition Focused Physical Exam conducted with patient verbal consent (see below).     Patient Reports appetite/ PO intake is improving gradually. Intake in house per flow sheet noted: 100% (11/20), 50-75% (11/24).       Patient has no complaint of  chewing or swallowing difficulties   Patient has no complaint of nausea, vomiting, diarrhea, and constipation    Nutrition Education provided: Patient requested and given coumadin/ warfarin education. Patient instructed to keep intakes of Vitamin K consistent with each meal, educated on which food items contain high amounts of coumadin to avoid. Patient educated on excessive ETOH interaction with coumadin/ medications in setting of cardiac complications. Further education deferred to medical team.

## 2020-11-25 NOTE — DIETITIAN INITIAL EVALUATION ADULT. - ADD RECOMMEND
1. Continue liberalized regular diet to promote adequate PO intake 2. Reinforce coumadin and low sodium/ heart healthy guidelines in setting of CAD 3. Daily staff assistance with patient menus/ food choices to honor patient meal preferences. 4. Will continue to monitor weight, PO intake, labs, f/u per protocol 1. Continue liberalized regular diet to promote adequate PO intake 2. Patient educated on excessive ETOH interaction with coumadin/ medications in setting of cardiac complications. Further education deferred to medical team. Reinforced low sodium/ heart healthy guidelines in setting of CAD 3. Daily staff assistance with patient menus/ food choices to honor patient meal preferences. 4. Will continue to monitor weight, PO intake, labs, f/u per protocol
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
## 2020-11-25 NOTE — DIETITIAN INITIAL EVALUATION ADULT. - PERSON TAUGHT/METHOD
patient instructed/Patient given coumadin education, reinforcement of heart healthy guidelines./verbal instruction/teach back - (Patient repeats in own words)

## 2020-11-25 NOTE — DIETITIAN INITIAL EVALUATION ADULT. - REASON INDICATOR FOR ASSESSMENT
Patient seen for length of stay. Patient information obtained from patient, and electronic medical record.

## 2020-11-26 DIAGNOSIS — I48.91 UNSPECIFIED ATRIAL FIBRILLATION: ICD-10-CM

## 2020-11-26 LAB
ALBUMIN SERPL ELPH-MCNC: 3.4 G/DL — SIGNIFICANT CHANGE UP (ref 3.3–5)
ALP SERPL-CCNC: 41 U/L — SIGNIFICANT CHANGE UP (ref 40–120)
ALT FLD-CCNC: 60 U/L — HIGH (ref 10–45)
ANION GAP SERPL CALC-SCNC: 11 MMOL/L — SIGNIFICANT CHANGE UP (ref 5–17)
APTT BLD: 28.4 SEC — SIGNIFICANT CHANGE UP (ref 27.5–35.5)
APTT BLD: 47.9 SEC — HIGH (ref 27.5–35.5)
APTT BLD: 62.5 SEC — HIGH (ref 27.5–35.5)
AST SERPL-CCNC: 66 U/L — HIGH (ref 10–40)
BILIRUB DIRECT SERPL-MCNC: 0.5 MG/DL — HIGH (ref 0–0.2)
BILIRUB INDIRECT FLD-MCNC: 1.1 MG/DL — HIGH (ref 0.2–1)
BILIRUB SERPL-MCNC: 1.6 MG/DL — HIGH (ref 0.2–1.2)
BUN SERPL-MCNC: 17 MG/DL — SIGNIFICANT CHANGE UP (ref 7–23)
CALCIUM SERPL-MCNC: 8.4 MG/DL — SIGNIFICANT CHANGE UP (ref 8.4–10.5)
CHLORIDE SERPL-SCNC: 99 MMOL/L — SIGNIFICANT CHANGE UP (ref 96–108)
CO2 SERPL-SCNC: 24 MMOL/L — SIGNIFICANT CHANGE UP (ref 22–31)
CREAT SERPL-MCNC: 1 MG/DL — SIGNIFICANT CHANGE UP (ref 0.5–1.3)
GLUCOSE SERPL-MCNC: 85 MG/DL — SIGNIFICANT CHANGE UP (ref 70–99)
HCT VFR BLD CALC: 24.1 % — LOW (ref 39–50)
HCT VFR BLD CALC: 26.3 % — LOW (ref 39–50)
HGB BLD-MCNC: 8.1 G/DL — LOW (ref 13–17)
HGB BLD-MCNC: 8.8 G/DL — LOW (ref 13–17)
INR BLD: 1.5 RATIO — HIGH (ref 0.88–1.16)
MAGNESIUM SERPL-MCNC: 2 MG/DL — SIGNIFICANT CHANGE UP (ref 1.6–2.6)
MCHC RBC-ENTMCNC: 29.5 PG — SIGNIFICANT CHANGE UP (ref 27–34)
MCHC RBC-ENTMCNC: 29.7 PG — SIGNIFICANT CHANGE UP (ref 27–34)
MCHC RBC-ENTMCNC: 33.5 GM/DL — SIGNIFICANT CHANGE UP (ref 32–36)
MCHC RBC-ENTMCNC: 33.6 GM/DL — SIGNIFICANT CHANGE UP (ref 32–36)
MCV RBC AUTO: 88.3 FL — SIGNIFICANT CHANGE UP (ref 80–100)
MCV RBC AUTO: 88.3 FL — SIGNIFICANT CHANGE UP (ref 80–100)
NRBC # BLD: 0 /100 WBCS — SIGNIFICANT CHANGE UP (ref 0–0)
NRBC # BLD: 0 /100 WBCS — SIGNIFICANT CHANGE UP (ref 0–0)
PLATELET # BLD AUTO: 173 K/UL — SIGNIFICANT CHANGE UP (ref 150–400)
PLATELET # BLD AUTO: 174 K/UL — SIGNIFICANT CHANGE UP (ref 150–400)
POTASSIUM SERPL-MCNC: 3.7 MMOL/L — SIGNIFICANT CHANGE UP (ref 3.5–5.3)
POTASSIUM SERPL-SCNC: 3.7 MMOL/L — SIGNIFICANT CHANGE UP (ref 3.5–5.3)
PROT SERPL-MCNC: 5.9 G/DL — LOW (ref 6–8.3)
PROTHROM AB SERPL-ACNC: 17.7 SEC — HIGH (ref 10.6–13.6)
RBC # BLD: 2.73 M/UL — LOW (ref 4.2–5.8)
RBC # BLD: 2.98 M/UL — LOW (ref 4.2–5.8)
RBC # FLD: 13.3 % — SIGNIFICANT CHANGE UP (ref 10.3–14.5)
RBC # FLD: 13.4 % — SIGNIFICANT CHANGE UP (ref 10.3–14.5)
SODIUM SERPL-SCNC: 134 MMOL/L — LOW (ref 135–145)
SURGICAL PATHOLOGY STUDY: SIGNIFICANT CHANGE UP
WBC # BLD: 7.55 K/UL — SIGNIFICANT CHANGE UP (ref 3.8–10.5)
WBC # BLD: 7.89 K/UL — SIGNIFICANT CHANGE UP (ref 3.8–10.5)
WBC # FLD AUTO: 7.55 K/UL — SIGNIFICANT CHANGE UP (ref 3.8–10.5)
WBC # FLD AUTO: 7.89 K/UL — SIGNIFICANT CHANGE UP (ref 3.8–10.5)

## 2020-11-26 PROCEDURE — 93010 ELECTROCARDIOGRAM REPORT: CPT | Mod: 59

## 2020-11-26 PROCEDURE — 99232 SBSQ HOSP IP/OBS MODERATE 35: CPT

## 2020-11-26 RX ORDER — HYDROMORPHONE HYDROCHLORIDE 2 MG/ML
0.25 INJECTION INTRAMUSCULAR; INTRAVENOUS; SUBCUTANEOUS ONCE
Refills: 0 | Status: DISCONTINUED | OUTPATIENT
Start: 2020-11-26 | End: 2020-11-26

## 2020-11-26 RX ORDER — POTASSIUM CHLORIDE 20 MEQ
20 PACKET (EA) ORAL ONCE
Refills: 0 | Status: COMPLETED | OUTPATIENT
Start: 2020-11-26 | End: 2020-11-26

## 2020-11-26 RX ORDER — WARFARIN SODIUM 2.5 MG/1
7.5 TABLET ORAL ONCE
Refills: 0 | Status: COMPLETED | OUTPATIENT
Start: 2020-11-26 | End: 2020-11-26

## 2020-11-26 RX ORDER — HEPARIN SODIUM 5000 [USP'U]/ML
1400 INJECTION INTRAVENOUS; SUBCUTANEOUS
Qty: 25000 | Refills: 0 | Status: DISCONTINUED | OUTPATIENT
Start: 2020-11-26 | End: 2020-11-26

## 2020-11-26 RX ORDER — HEPARIN SODIUM 5000 [USP'U]/ML
1500 INJECTION INTRAVENOUS; SUBCUTANEOUS
Qty: 25000 | Refills: 0 | Status: DISCONTINUED | OUTPATIENT
Start: 2020-11-26 | End: 2020-11-27

## 2020-11-26 RX ORDER — AMIODARONE HYDROCHLORIDE 400 MG/1
TABLET ORAL
Refills: 0 | Status: DISCONTINUED | OUTPATIENT
Start: 2020-11-26 | End: 2020-11-26

## 2020-11-26 RX ORDER — AMIODARONE HYDROCHLORIDE 400 MG/1
400 TABLET ORAL EVERY 8 HOURS
Refills: 0 | Status: DISCONTINUED | OUTPATIENT
Start: 2020-11-26 | End: 2020-11-26

## 2020-11-26 RX ADMIN — HYDROMORPHONE HYDROCHLORIDE 0.25 MILLIGRAM(S): 2 INJECTION INTRAMUSCULAR; INTRAVENOUS; SUBCUTANEOUS at 05:51

## 2020-11-26 RX ADMIN — Medication 325 MILLIGRAM(S): at 13:04

## 2020-11-26 RX ADMIN — AMIODARONE HYDROCHLORIDE 400 MILLIGRAM(S): 400 TABLET ORAL at 09:04

## 2020-11-26 RX ADMIN — Medication 10 MILLIGRAM(S): at 05:34

## 2020-11-26 RX ADMIN — SODIUM CHLORIDE 3 MILLILITER(S): 9 INJECTION INTRAMUSCULAR; INTRAVENOUS; SUBCUTANEOUS at 06:37

## 2020-11-26 RX ADMIN — ATORVASTATIN CALCIUM 40 MILLIGRAM(S): 80 TABLET, FILM COATED ORAL at 21:47

## 2020-11-26 RX ADMIN — HYDROMORPHONE HYDROCHLORIDE 4 MILLIGRAM(S): 2 INJECTION INTRAMUSCULAR; INTRAVENOUS; SUBCUTANEOUS at 18:01

## 2020-11-26 RX ADMIN — Medication 1 MILLIGRAM(S): at 13:04

## 2020-11-26 RX ADMIN — HYDROMORPHONE HYDROCHLORIDE 4 MILLIGRAM(S): 2 INJECTION INTRAMUSCULAR; INTRAVENOUS; SUBCUTANEOUS at 17:04

## 2020-11-26 RX ADMIN — SODIUM CHLORIDE 3 MILLILITER(S): 9 INJECTION INTRAMUSCULAR; INTRAVENOUS; SUBCUTANEOUS at 21:48

## 2020-11-26 RX ADMIN — HYDROMORPHONE HYDROCHLORIDE 4 MILLIGRAM(S): 2 INJECTION INTRAMUSCULAR; INTRAVENOUS; SUBCUTANEOUS at 10:05

## 2020-11-26 RX ADMIN — Medication 325 MILLIGRAM(S): at 05:34

## 2020-11-26 RX ADMIN — HYDROMORPHONE HYDROCHLORIDE 4 MILLIGRAM(S): 2 INJECTION INTRAMUSCULAR; INTRAVENOUS; SUBCUTANEOUS at 21:47

## 2020-11-26 RX ADMIN — Medication 81 MILLIGRAM(S): at 13:04

## 2020-11-26 RX ADMIN — HYDROMORPHONE HYDROCHLORIDE 4 MILLIGRAM(S): 2 INJECTION INTRAMUSCULAR; INTRAVENOUS; SUBCUTANEOUS at 04:05

## 2020-11-26 RX ADMIN — HYDROMORPHONE HYDROCHLORIDE 4 MILLIGRAM(S): 2 INJECTION INTRAMUSCULAR; INTRAVENOUS; SUBCUTANEOUS at 22:45

## 2020-11-26 RX ADMIN — HYDROMORPHONE HYDROCHLORIDE 4 MILLIGRAM(S): 2 INJECTION INTRAMUSCULAR; INTRAVENOUS; SUBCUTANEOUS at 14:05

## 2020-11-26 RX ADMIN — HEPARIN SODIUM 14 UNIT(S)/HR: 5000 INJECTION INTRAVENOUS; SUBCUTANEOUS at 09:29

## 2020-11-26 RX ADMIN — HYDROMORPHONE HYDROCHLORIDE 4 MILLIGRAM(S): 2 INJECTION INTRAMUSCULAR; INTRAVENOUS; SUBCUTANEOUS at 13:04

## 2020-11-26 RX ADMIN — WARFARIN SODIUM 7.5 MILLIGRAM(S): 2.5 TABLET ORAL at 21:47

## 2020-11-26 RX ADMIN — Medication 325 MILLIGRAM(S): at 21:47

## 2020-11-26 RX ADMIN — Medication 20 MILLIEQUIVALENT(S): at 09:08

## 2020-11-26 RX ADMIN — Medication 500 MILLIGRAM(S): at 13:04

## 2020-11-26 RX ADMIN — HYDROMORPHONE HYDROCHLORIDE 0.25 MILLIGRAM(S): 2 INJECTION INTRAMUSCULAR; INTRAVENOUS; SUBCUTANEOUS at 05:34

## 2020-11-26 RX ADMIN — HYDROMORPHONE HYDROCHLORIDE 4 MILLIGRAM(S): 2 INJECTION INTRAMUSCULAR; INTRAVENOUS; SUBCUTANEOUS at 09:04

## 2020-11-26 RX ADMIN — HEPARIN SODIUM 15 UNIT(S)/HR: 5000 INJECTION INTRAVENOUS; SUBCUTANEOUS at 18:28

## 2020-11-26 RX ADMIN — SODIUM CHLORIDE 3 MILLILITER(S): 9 INJECTION INTRAMUSCULAR; INTRAVENOUS; SUBCUTANEOUS at 13:01

## 2020-11-26 RX ADMIN — HYDROMORPHONE HYDROCHLORIDE 4 MILLIGRAM(S): 2 INJECTION INTRAMUSCULAR; INTRAVENOUS; SUBCUTANEOUS at 03:35

## 2020-11-26 RX ADMIN — Medication 5 MILLIGRAM(S): at 21:47

## 2020-11-26 NOTE — PROGRESS NOTE ADULT - SUBJECTIVE AND OBJECTIVE BOX
Cardiovascular Disease Progress Note    Overnight events: No acute events overnight. Mr. Schaefer denies chest pain or SOB.    Otherwise review of systems negative    Objective Findings:  T(C): 36.7 (11-26-20 @ 05:33), Max: 37.1 (11-25-20 @ 16:40)  HR: 75 (11-26-20 @ 05:33) (70 - 75)  BP: 130/74 (11-26-20 @ 05:53) (118/64 - 146/71)  RR: 18 (11-26-20 @ 05:33) (18 - 18)  SpO2: 94% (11-26-20 @ 05:33) (94% - 95%)  Wt(kg): --  Daily     Daily       Physical Exam:  Gen: NAD; Patient resting comfortably  HEENT: EOMI, Normocephalic/ atraumatic  CV: RRR, normal S1 + S2, no m/r/g  Lungs:  Normal respiratory effort; clear to auscultation bilaterally  Abd: soft, non-tender; bowel sounds present  Ext: No edema; warm and well perfused    Telemetry: a-flutter    Laboratory Data:                        8.8    7.55  )-----------( 174      ( 26 Nov 2020 06:12 )             26.3     11-26    134<L>  |  99  |  17  ----------------------------<  85  3.7   |  24  |  1.00    Ca    8.4      26 Nov 2020 06:12    TPro  5.9<L>  /  Alb  3.4  /  TBili  1.6<H>  /  DBili  0.5<H>  /  AST  66<H>  /  ALT  60<H>  /  AlkPhos  41  11-26    PT/INR - ( 26 Nov 2020 06:12 )   PT: 17.7 sec;   INR: 1.50 ratio         PTT - ( 26 Nov 2020 06:12 )  PTT:28.4 sec          Inpatient Medications:  MEDICATIONS  (STANDING):  aMIOdarone    Tablet   Oral   aMIOdarone    Tablet 400 milliGRAM(s) Oral every 8 hours  ascorbic acid 500 milliGRAM(s) Oral daily  aspirin enteric coated 81 milliGRAM(s) Oral daily  atorvastatin 40 milliGRAM(s) Oral at bedtime  enoxaparin Injectable 40 milliGRAM(s) SubCutaneous daily  ferrous    sulfate 325 milliGRAM(s) Oral three times a day  folic acid 1 milliGRAM(s) Oral daily  heparin  Infusion 1400 Unit(s)/Hr (14 mL/Hr) IV Continuous <Continuous>  melatonin 5 milliGRAM(s) Oral at bedtime  polyethylene glycol 3350 17 Gram(s) Oral daily  sodium chloride 0.9% lock flush 3 milliLiter(s) IV Push every 8 hours  warfarin 7.5 milliGRAM(s) Oral once      Assessment: 60 year old man with HTN, CAD and mitral valve prolapse with severe regurgitation presents with SOB.    Plan of Care:    #Atrial Flutter-  Rate controlled.  Amiodarone load with heparin to warfarin bridging.     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  Status post MV repair with WILBER ligation on 11/21.  Warfarin dosing for goal INR 2-3.     #Complete heart block-  Post-operatively.  Conduction improving.     #CAD-  60% RCA lesion.  S/p CABG x 1 with SVG to RCA.  ASA and statin.     #ACP (advance care planning)-  Advanced care planning was discussed with the patient.   Risks, benefits and alternatives of medical treatment and procedures were discussed in detail and all questions were answered.      Rest of care as per CT surgery team.         Over 25 minutes spent on total encounter; more than 50% of the visit was spent counseling and/or coordinating care by the attending physician.      Luís Gold MD [facility]  Cardiovascular Disease  [phone]
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
## 2020-11-26 NOTE — PROGRESS NOTE ADULT - SUBJECTIVE AND OBJECTIVE BOX
For all Cardiology service contact information, go to [email or web address] and use "PPTV" to login.    SUBJECTIVE:   No events overnight. Denies CP, SOB or Dyspnea.   Telemetry reviewed. Sinus rhythm with 1 degree AV block at rates 75-80  -------------------------------------------------------------------------------------------  ROS:  CV: chest pain (-), palpitation (-), orthopnea (-), PND (-), edema (-)  PULM: SOB (-), cough (-), wheezing (-), hemoptysis (-).   CONST: fever (-), chills (-) or fatigue (-)  GI: abdominal distension (-), abdominal pain (-) , nausea/vomiting (-), hematemesis, (-), melena (-), hematochezia (-)  : dysuria (-), frequency (-), hematuria (-).   NEURO: numbness (-), weakness (-), dizziness (-)  SKIN: itching (-), rash (-)  HEENT:  visual changes (-); vertigo or throat pain (-);  neck stiffness (-)     All other review of systems is negative unless indicated above.   -------------------------------------------------------------------------------------------  VS:  Vital Signs Last 24 Hrs  T(C): 36.7 (26 Nov 2020 12:53), Max: 37.1 (25 Nov 2020 16:40)  T(F): 98 (26 Nov 2020 12:53), Max: 98.8 (25 Nov 2020 16:40)  HR: 73 (26 Nov 2020 12:53) (70 - 75)  BP: 133/90 (26 Nov 2020 12:53) (127/78 - 146/71)  BP(mean): --  RR: 18 (26 Nov 2020 12:53) (18 - 18)  SpO2: 95% (26 Nov 2020 12:53) (94% - 95%)    ------------------------------------------------------------------------------------------  PHYSICAL EXAM:  GENERAL: NAD  HEAD:  Atraumatic, Normocephalic.  EYES: EOMI, PERRLA, conjunctiva and sclera clear.  ENT: Moist mucous membranes.  NECK: Supple, No JVD.  CHEST/LUNG: Clear to auscultation bilaterally; No rales, rhonchi, wheezing, or rubs. Unlabored respirations.  HEART: Regular rate and rhythm; No murmurs, rubs, or gallops.  ABDOMEN: Bowel sounds present; Soft, Nontender, Nondistended.   EXTREMITIES:  2+ Peripheral Pulses, brisk capillary refill. No clubbing, cyanosis, or edema.  PSYCH: Normal affect.  SKIN: No rashes or lesions.  -------------------------------------------------------------------------------------------  LABS:                          8.8    7.55  )-----------( 174      ( 26 Nov 2020 06:12 )             26.3   11-26    134<L>  |  99  |  17  ----------------------------<  85  3.7   |  24  |  1.00    Ca    8.4      26 Nov 2020 06:12  Mg     2.0     11-26    TPro  5.9<L>  /  Alb  3.4  /  TBili  1.6<H>  /  DBili  0.5<H>  /  AST  66<H>  /  ALT  60<H>  /  AlkPhos  41  11-26              -------------------------------------------------------------------------------------------  Meds:  aspirin enteric coated 81 milliGRAM(s) Oral daily  atorvastatin 40 milliGRAM(s) Oral at bedtime  enoxaparin Injectable 40 milliGRAM(s) SubCutaneous daily  HYDROmorphone   Tablet 2 milliGRAM(s) Oral every 3 hours PRN  HYDROmorphone   Tablet 4 milliGRAM(s) Oral every 4 hours PRN  melatonin 5 milliGRAM(s) Oral at bedtime  metoclopramide 10 milliGRAM(s) Oral every 8 hours  polyethylene glycol 3350 17 Gram(s) Oral daily  sodium chloride 0.9% lock flush 3 milliLiter(s) IV Push every 8 hours    -------------------------------------------------------------------------------------------

## 2020-11-26 NOTE — PROGRESS NOTE ADULT - PROBLEM SELECTOR PLAN 2
Continue ASA 81 mg QD   Continue AC w/ Coumadin - plan to dose 7.5 mg tonight  hep gtt for afib    Daily PT/INR

## 2020-11-26 NOTE — PROGRESS NOTE ADULT - PROBLEM SELECTOR PLAN 3
Positive HCV serology with positive HCV PCR  Hepatology consulted - recoomend and US   to be done in am  Daily LFTs to trend Positive HCV serology with positive HCV PCR  Hepatology consulted - recommend and US   to be done in am  friday  Daily LFTs to trend

## 2020-11-26 NOTE — PROGRESS NOTE ADULT - PROBLEM SELECTOR PLAN 4
EP following,   amio   load  will watch rhythm closely EP following,   amio   load  on then dc    will watch rhythm closely

## 2020-11-26 NOTE — PROGRESS NOTE ADULT - ASSESSMENT
60M with HTN, CAD with CABG x 1 (saphenous vein graft to RCA for 60% stenosis), hx of MVP with severe MR s/p elective MVR on 11/21 with prosthetic ring c/b post-operative junctional tachycardia, now in atrial flutter- was in AF last night- POD# 5. Continue to monitor on tele.

## 2020-11-26 NOTE — PROGRESS NOTE ADULT - SUBJECTIVE AND OBJECTIVE BOX
VITAL SIGNS    Telemetry:  afib   80    Vital Signs Last 24 Hrs  T(C): 36.7 (11-26-20 @ 05:33), Max: 37.1 (11-25-20 @ 16:40)  T(F): 98.1 (11-26-20 @ 05:33), Max: 98.8 (11-25-20 @ 16:40)  HR: 75 (11-26-20 @ 05:33) (70 - 75)  BP: 130/74 (11-26-20 @ 05:53) (118/64 - 146/71)  RR: 18 (11-26-20 @ 05:33) (18 - 18)  SpO2: 94% (11-26-20 @ 05:33) (94% - 95%)                   Daily     Daily       Bilirubin Direct, Serum: 0.5 mg/dL (11-26 @ 06:12)  Bilirubin Total, Serum: 1.6 mg/dL (11-26 @ 06:12)    CAPILLARY BLOOD GLUCOSE      Coumadin    [ ] YES                               PHYSICAL EXAM    Neurology: alert and oriented x 3, moves all extremities with no defecits  CV :  RRR  Sternal Wound :  CDI , Stable  Lungs:   CTA B/L  Abdomen: soft, nontender, nondistended, positive bowel sounds, last bowel movement   11/25  Extremities:     trace pedal edema

## 2020-11-27 ENCOUNTER — TRANSCRIPTION ENCOUNTER (OUTPATIENT)
Age: 60
End: 2020-11-27

## 2020-11-27 VITALS
DIASTOLIC BLOOD PRESSURE: 83 MMHG | SYSTOLIC BLOOD PRESSURE: 125 MMHG | TEMPERATURE: 98 F | OXYGEN SATURATION: 97 % | RESPIRATION RATE: 18 BRPM | HEART RATE: 74 BPM

## 2020-11-27 LAB
ALBUMIN SERPL ELPH-MCNC: 3.1 G/DL — LOW (ref 3.3–5)
ALP SERPL-CCNC: 38 U/L — LOW (ref 40–120)
ALT FLD-CCNC: 58 U/L — HIGH (ref 10–45)
ANION GAP SERPL CALC-SCNC: 10 MMOL/L — SIGNIFICANT CHANGE UP (ref 5–17)
APTT BLD: 65 SEC — HIGH (ref 27.5–35.5)
AST SERPL-CCNC: 57 U/L — HIGH (ref 10–40)
BILIRUB DIRECT SERPL-MCNC: 0.3 MG/DL — HIGH (ref 0–0.2)
BILIRUB INDIRECT FLD-MCNC: 1 MG/DL — SIGNIFICANT CHANGE UP (ref 0.2–1)
BILIRUB SERPL-MCNC: 1.3 MG/DL — HIGH (ref 0.2–1.2)
BUN SERPL-MCNC: 14 MG/DL — SIGNIFICANT CHANGE UP (ref 7–23)
CALCIUM SERPL-MCNC: 8.3 MG/DL — LOW (ref 8.4–10.5)
CHLORIDE SERPL-SCNC: 101 MMOL/L — SIGNIFICANT CHANGE UP (ref 96–108)
CO2 SERPL-SCNC: 24 MMOL/L — SIGNIFICANT CHANGE UP (ref 22–31)
CREAT SERPL-MCNC: 0.97 MG/DL — SIGNIFICANT CHANGE UP (ref 0.5–1.3)
GLUCOSE SERPL-MCNC: 88 MG/DL — SIGNIFICANT CHANGE UP (ref 70–99)
HCT VFR BLD CALC: 28.3 % — LOW (ref 39–50)
HGB BLD-MCNC: 9.5 G/DL — LOW (ref 13–17)
INR BLD: 2.18 RATIO — HIGH (ref 0.88–1.16)
MCHC RBC-ENTMCNC: 29.8 PG — SIGNIFICANT CHANGE UP (ref 27–34)
MCHC RBC-ENTMCNC: 33.6 GM/DL — SIGNIFICANT CHANGE UP (ref 32–36)
MCV RBC AUTO: 88.7 FL — SIGNIFICANT CHANGE UP (ref 80–100)
NRBC # BLD: 0 /100 WBCS — SIGNIFICANT CHANGE UP (ref 0–0)
PLATELET # BLD AUTO: 180 K/UL — SIGNIFICANT CHANGE UP (ref 150–400)
POTASSIUM SERPL-MCNC: 3.8 MMOL/L — SIGNIFICANT CHANGE UP (ref 3.5–5.3)
POTASSIUM SERPL-SCNC: 3.8 MMOL/L — SIGNIFICANT CHANGE UP (ref 3.5–5.3)
PROT SERPL-MCNC: 5.8 G/DL — LOW (ref 6–8.3)
PROTHROM AB SERPL-ACNC: 25.2 SEC — HIGH (ref 10.6–13.6)
RBC # BLD: 3.19 M/UL — LOW (ref 4.2–5.8)
RBC # FLD: 13.4 % — SIGNIFICANT CHANGE UP (ref 10.3–14.5)
SODIUM SERPL-SCNC: 135 MMOL/L — SIGNIFICANT CHANGE UP (ref 135–145)
WBC # BLD: 9.82 K/UL — SIGNIFICANT CHANGE UP (ref 3.8–10.5)
WBC # FLD AUTO: 9.82 K/UL — SIGNIFICANT CHANGE UP (ref 3.8–10.5)

## 2020-11-27 PROCEDURE — 87640 STAPH A DNA AMP PROBE: CPT

## 2020-11-27 PROCEDURE — 84132 ASSAY OF SERUM POTASSIUM: CPT

## 2020-11-27 PROCEDURE — 87641 MR-STAPH DNA AMP PROBE: CPT

## 2020-11-27 PROCEDURE — 82803 BLOOD GASES ANY COMBINATION: CPT

## 2020-11-27 PROCEDURE — 76700 US EXAM ABDOM COMPLETE: CPT

## 2020-11-27 PROCEDURE — 86850 RBC ANTIBODY SCREEN: CPT

## 2020-11-27 PROCEDURE — 83735 ASSAY OF MAGNESIUM: CPT

## 2020-11-27 PROCEDURE — 80053 COMPREHEN METABOLIC PANEL: CPT

## 2020-11-27 PROCEDURE — 80048 BASIC METABOLIC PNL TOTAL CA: CPT

## 2020-11-27 PROCEDURE — 85396 CLOTTING ASSAY WHOLE BLOOD: CPT

## 2020-11-27 PROCEDURE — 82435 ASSAY OF BLOOD CHLORIDE: CPT

## 2020-11-27 PROCEDURE — 86901 BLOOD TYPING SEROLOGIC RH(D): CPT

## 2020-11-27 PROCEDURE — 83880 ASSAY OF NATRIURETIC PEPTIDE: CPT

## 2020-11-27 PROCEDURE — 85384 FIBRINOGEN ACTIVITY: CPT

## 2020-11-27 PROCEDURE — 71045 X-RAY EXAM CHEST 1 VIEW: CPT

## 2020-11-27 PROCEDURE — C1729: CPT

## 2020-11-27 PROCEDURE — 85018 HEMOGLOBIN: CPT

## 2020-11-27 PROCEDURE — 84295 ASSAY OF SERUM SODIUM: CPT

## 2020-11-27 PROCEDURE — 86769 SARS-COV-2 COVID-19 ANTIBODY: CPT

## 2020-11-27 PROCEDURE — 81003 URINALYSIS AUTO W/O SCOPE: CPT

## 2020-11-27 PROCEDURE — 97116 GAIT TRAINING THERAPY: CPT

## 2020-11-27 PROCEDURE — 82550 ASSAY OF CK (CPK): CPT

## 2020-11-27 PROCEDURE — 88305 TISSUE EXAM BY PATHOLOGIST: CPT

## 2020-11-27 PROCEDURE — 80076 HEPATIC FUNCTION PANEL: CPT

## 2020-11-27 PROCEDURE — 99233 SBSQ HOSP IP/OBS HIGH 50: CPT

## 2020-11-27 PROCEDURE — 84100 ASSAY OF PHOSPHORUS: CPT

## 2020-11-27 PROCEDURE — 84443 ASSAY THYROID STIM HORMONE: CPT

## 2020-11-27 PROCEDURE — 86900 BLOOD TYPING SEROLOGIC ABO: CPT

## 2020-11-27 PROCEDURE — 83036 HEMOGLOBIN GLYCOSYLATED A1C: CPT

## 2020-11-27 PROCEDURE — 86923 COMPATIBILITY TEST ELECTRIC: CPT

## 2020-11-27 PROCEDURE — 93005 ELECTROCARDIOGRAM TRACING: CPT

## 2020-11-27 PROCEDURE — U0003: CPT

## 2020-11-27 PROCEDURE — 84484 ASSAY OF TROPONIN QUANT: CPT

## 2020-11-27 PROCEDURE — 85610 PROTHROMBIN TIME: CPT

## 2020-11-27 PROCEDURE — 82947 ASSAY GLUCOSE BLOOD QUANT: CPT

## 2020-11-27 PROCEDURE — 85730 THROMBOPLASTIN TIME PARTIAL: CPT

## 2020-11-27 PROCEDURE — 82565 ASSAY OF CREATININE: CPT

## 2020-11-27 PROCEDURE — 85014 HEMATOCRIT: CPT

## 2020-11-27 PROCEDURE — C1751: CPT

## 2020-11-27 PROCEDURE — 82553 CREATINE MB FRACTION: CPT

## 2020-11-27 PROCEDURE — C1889: CPT

## 2020-11-27 PROCEDURE — P9047: CPT

## 2020-11-27 PROCEDURE — 97162 PT EVAL MOD COMPLEX 30 MIN: CPT

## 2020-11-27 PROCEDURE — 85025 COMPLETE CBC W/AUTO DIFF WBC: CPT

## 2020-11-27 PROCEDURE — P9045: CPT

## 2020-11-27 PROCEDURE — 76700 US EXAM ABDOM COMPLETE: CPT | Mod: 26

## 2020-11-27 PROCEDURE — 82962 GLUCOSE BLOOD TEST: CPT

## 2020-11-27 PROCEDURE — C1769: CPT

## 2020-11-27 PROCEDURE — 94002 VENT MGMT INPAT INIT DAY: CPT

## 2020-11-27 PROCEDURE — 85027 COMPLETE CBC AUTOMATED: CPT

## 2020-11-27 PROCEDURE — 83605 ASSAY OF LACTIC ACID: CPT

## 2020-11-27 PROCEDURE — 82330 ASSAY OF CALCIUM: CPT

## 2020-11-27 PROCEDURE — 86891 AUTOLOGOUS BLOOD OP SALVAGE: CPT

## 2020-11-27 PROCEDURE — 97530 THERAPEUTIC ACTIVITIES: CPT

## 2020-11-27 RX ORDER — POTASSIUM CHLORIDE 20 MEQ
20 PACKET (EA) ORAL ONCE
Refills: 0 | Status: COMPLETED | OUTPATIENT
Start: 2020-11-27 | End: 2020-11-27

## 2020-11-27 RX ORDER — WARFARIN SODIUM 2.5 MG/1
1 TABLET ORAL
Qty: 30 | Refills: 0
Start: 2020-11-27 | End: 2020-12-26

## 2020-11-27 RX ORDER — CYCLOBENZAPRINE HYDROCHLORIDE 10 MG/1
1 TABLET, FILM COATED ORAL
Qty: 0 | Refills: 0 | DISCHARGE

## 2020-11-27 RX ORDER — POLYETHYLENE GLYCOL 3350 17 G/17G
17 POWDER, FOR SOLUTION ORAL
Qty: 0 | Refills: 0 | DISCHARGE
Start: 2020-11-27

## 2020-11-27 RX ORDER — ASPIRIN/CALCIUM CARB/MAGNESIUM 324 MG
1 TABLET ORAL
Qty: 30 | Refills: 0
Start: 2020-11-27 | End: 2020-12-26

## 2020-11-27 RX ORDER — AMLODIPINE BESYLATE 2.5 MG/1
1 TABLET ORAL
Qty: 0 | Refills: 0 | DISCHARGE

## 2020-11-27 RX ORDER — ATORVASTATIN CALCIUM 80 MG/1
1 TABLET, FILM COATED ORAL
Qty: 30 | Refills: 0
Start: 2020-11-27 | End: 2020-12-26

## 2020-11-27 RX ORDER — HYDROMORPHONE HYDROCHLORIDE 2 MG/ML
1 INJECTION INTRAMUSCULAR; INTRAVENOUS; SUBCUTANEOUS
Qty: 20 | Refills: 0
Start: 2020-11-27 | End: 2020-12-01

## 2020-11-27 RX ORDER — LOSARTAN POTASSIUM 100 MG/1
1 TABLET, FILM COATED ORAL
Qty: 0 | Refills: 0 | DISCHARGE

## 2020-11-27 RX ADMIN — HYDROMORPHONE HYDROCHLORIDE 4 MILLIGRAM(S): 2 INJECTION INTRAMUSCULAR; INTRAVENOUS; SUBCUTANEOUS at 05:19

## 2020-11-27 RX ADMIN — Medication 325 MILLIGRAM(S): at 05:19

## 2020-11-27 RX ADMIN — SODIUM CHLORIDE 3 MILLILITER(S): 9 INJECTION INTRAMUSCULAR; INTRAVENOUS; SUBCUTANEOUS at 16:14

## 2020-11-27 RX ADMIN — Medication 1 MILLIGRAM(S): at 11:32

## 2020-11-27 RX ADMIN — Medication 81 MILLIGRAM(S): at 11:32

## 2020-11-27 RX ADMIN — SODIUM CHLORIDE 3 MILLILITER(S): 9 INJECTION INTRAMUSCULAR; INTRAVENOUS; SUBCUTANEOUS at 05:19

## 2020-11-27 RX ADMIN — Medication 20 MILLIEQUIVALENT(S): at 15:01

## 2020-11-27 RX ADMIN — Medication 325 MILLIGRAM(S): at 15:02

## 2020-11-27 RX ADMIN — Medication 500 MILLIGRAM(S): at 11:32

## 2020-11-27 RX ADMIN — POLYETHYLENE GLYCOL 3350 17 GRAM(S): 17 POWDER, FOR SOLUTION ORAL at 11:31

## 2020-11-27 NOTE — DISCHARGE NOTE PROVIDER - NSDCCPCAREPLAN_GEN_ALL_CORE_FT
PRINCIPAL DISCHARGE DIAGNOSIS  Diagnosis: S/P MVR (mitral valve repair)  Assessment and Plan of Treatment: shower daily  weigh yourself daily  continue current prescriptions as ordered  increase activity as tolerated   no added salt; low fat; low cholesterol, low salt diet.   follow up with Cardiologist in 1-2 weeks. Call to schedule appointment.  follow up with cardiac surgeon         PRINCIPAL DISCHARGE DIAGNOSIS  Diagnosis: S/P MVR (mitral valve repair)  Assessment and Plan of Treatment: shower daily  weigh yourself daily  continue current prescriptions as ordered  increase activity as tolerated   no added salt; low fat; low cholesterol, low salt diet.   follow up with cardiac surgeon, Dr. Sarmiento, Dec 8th 8:45 am. Call to confirm appointment. [phone]  followup with cardiologist, Dr. Bush for coumadin bloodwork every monday and thursday starting monday 11/30/2020 ( at Dr. Bush's office)  followup results of cardiac monitor ( [facility]OT) in 2-4 weeks with Dr. Galvez. Call office to schedule appointment.
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
## 2020-11-27 NOTE — PROGRESS NOTE ADULT - SUBJECTIVE AND OBJECTIVE BOX
Cardiovascular Disease Progress Note    Overnight events: No acute events overnight. Patient denies chest pain or SOB.    Otherwise review of systems negative    Objective Findings:  T(C): 36.7 (20 @ 05:29), Max: 36.7 (20 @ 12:53)  HR: 78 (20 @ 05:29) (71 - 78)  BP: 130/81 (20 @ 05:29) (130/81 - 133/90)  RR: 18 (20 @ 05:29) (18 - 18)  SpO2: 94% (20 @ 05:29) (93% - 95%)  Wt(kg): --  Daily     Daily Weight in k.6 (2020 08:10)      Physical Exam:  Gen: NAD; Patient resting comfortably  HEENT: EOMI, Normocephalic/ atraumatic  CV: RRR, normal S1 + S2, no m/r/g  Lungs:  Normal respiratory effort; clear to auscultation bilaterally  Abd: soft, non-tender; bowel sounds present  Ext: No edema; warm and well perfused    Telemetry: a-flutter    Laboratory Data:                        9.5    9.82  )-----------( 180      ( 2020 05:50 )             28.3         135  |  101  |  14  ----------------------------<  88  3.8   |  24  |  0.97    Ca    8.3<L>      2020 05:50  Mg     2.0         TPro  5.8<L>  /  Alb  3.1<L>  /  TBili  1.3<H>  /  DBili  0.3<H>  /  AST  57<H>  /  ALT  58<H>  /  AlkPhos  38<L>      PT/INR - ( 2020 05:50 )   PT: 25.2 sec;   INR: 2.18 ratio         PTT - ( 2020 05:50 )  PTT:65.0 sec          Inpatient Medications:  MEDICATIONS  (STANDING):  ascorbic acid 500 milliGRAM(s) Oral daily  aspirin enteric coated 81 milliGRAM(s) Oral daily  atorvastatin 40 milliGRAM(s) Oral at bedtime  ferrous    sulfate 325 milliGRAM(s) Oral three times a day  folic acid 1 milliGRAM(s) Oral daily  melatonin 5 milliGRAM(s) Oral at bedtime  polyethylene glycol 3350 17 Gram(s) Oral daily  sodium chloride 0.9% lock flush 3 milliLiter(s) IV Push every 8 hours      Assessment: 60 year old man with HTN, CAD and mitral valve prolapse with severe regurgitation presents with SOB.    Plan of Care:    #Atrial Flutter-  Rate controlled.  Dosing warfarin for goal INR 2-3.     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  Status post MV repair with WILBER ligation on .  Warfarin dosing for goal INR 2-3.     #Complete heart block-  Post-operatively.  Conduction improving.     #CAD-  60% RCA lesion.  S/p CABG x 1 with SVG to RCA.  ASA and statin.       Over 25 minutes spent on total encounter; more than 50% of the visit was spent counseling and/or coordinating care by the attending physician.      Luís Gold MD [facility]  Cardiovascular Disease  [phone]

## 2020-11-27 NOTE — PROGRESS NOTE ADULT - PROBLEM SELECTOR PLAN 3
Positive HCV serology with positive HCV PCR  Hepatology consulted - recommend and US   to be done in am  friday  Daily LFTs to trend

## 2020-11-27 NOTE — PROGRESS NOTE ADULT - SUBJECTIVE AND OBJECTIVE BOX
INTERVAL HPI/OVERNIGHT EVENTS:    MEDICATIONS  (STANDING):  ascorbic acid 500 milliGRAM(s) Oral daily  aspirin enteric coated 81 milliGRAM(s) Oral daily  atorvastatin 40 milliGRAM(s) Oral at bedtime  ferrous    sulfate 325 milliGRAM(s) Oral three times a day  folic acid 1 milliGRAM(s) Oral daily  melatonin 5 milliGRAM(s) Oral at bedtime  polyethylene glycol 3350 17 Gram(s) Oral daily  sodium chloride 0.9% lock flush 3 milliLiter(s) IV Push every 8 hours    MEDICATIONS  (PRN):  HYDROmorphone   Tablet 2 milliGRAM(s) Oral every 3 hours PRN Moderate Pain (4 - 6)  HYDROmorphone   Tablet 4 milliGRAM(s) Oral every 4 hours PRN Severe Pain (7 - 10)      Allergies    No Known Allergies    Intolerances      ROS:      Vital Signs Last 24 Hrs  T(C): 36.7 (27 Nov 2020 05:29), Max: 36.7 (26 Nov 2020 12:53)  T(F): 98.1 (27 Nov 2020 05:29), Max: 98.1 (26 Nov 2020 20:26)  HR: 78 (27 Nov 2020 05:29) (71 - 78)  BP: 130/81 (27 Nov 2020 05:29) (130/81 - 133/90)  BP(mean): --  RR: 18 (27 Nov 2020 05:29) (18 - 18)  SpO2: 94% (27 Nov 2020 05:29) (93% - 95%)      Physical Exam:      LABS:                        9.5    9.82  )-----------( 180      ( 27 Nov 2020 05:50 )             28.3     11-27    135  |  101  |  14  ----------------------------<  88  3.8   |  24  |  0.97    Ca    8.3<L>      27 Nov 2020 05:50  Mg     2.0     11-26    TPro  5.8<L>  /  Alb  3.1<L>  /  TBili  1.3<H>  /  DBili  0.3<H>  /  AST  57<H>  /  ALT  58<H>  /  AlkPhos  38<L>  11-27    PT/INR - ( 27 Nov 2020 05:50 )   PT: 25.2 sec;   INR: 2.18 ratio         PTT - ( 27 Nov 2020 05:50 )  PTT:65.0 sec      RADIOLOGY & ADDITIONAL TESTS:    TELE:    EKG: INTERVAL HPI/OVERNIGHT EVENTS: Patient converted to Aflutter since 11/25 around noon. Denies CP, SOB or palpitations. HR is controlled.    MEDICATIONS  (STANDING):  ascorbic acid 500 milliGRAM(s) Oral daily  aspirin enteric coated 81 milliGRAM(s) Oral daily  atorvastatin 40 milliGRAM(s) Oral at bedtime  ferrous    sulfate 325 milliGRAM(s) Oral three times a day  folic acid 1 milliGRAM(s) Oral daily  melatonin 5 milliGRAM(s) Oral at bedtime  polyethylene glycol 3350 17 Gram(s) Oral daily  sodium chloride 0.9% lock flush 3 milliLiter(s) IV Push every 8 hours    MEDICATIONS  (PRN):  HYDROmorphone   Tablet 2 milliGRAM(s) Oral every 3 hours PRN Moderate Pain (4 - 6)  HYDROmorphone   Tablet 4 milliGRAM(s) Oral every 4 hours PRN Severe Pain (7 - 10)      Allergies    No Known Allergies    Intolerances    REVIEW OF SYSTEMS:  CONSTITUTIONAL: No weakness, fevers or chills  EYES/ENT: No visual changes;  No vertigo or throat pain   NECK: No pain or stiffness  RESPIRATORY: No cough, wheezing, hemoptysis; No shortness of breath  CARDIOVASCULAR: No chest pain or palpitations  GASTROINTESTINAL: No abdominal or epigastric pain. No nausea, vomiting, or hematemesis; No diarrhea or constipation. No melena or hematochezia.  GENITOURINARY: No dysuria, frequency or hematuria  NEUROLOGICAL: No numbness or weakness  SKIN: No itching, rashes      Vital Signs Last 24 Hrs  T(C): 36.7 (27 Nov 2020 05:29), Max: 36.7 (26 Nov 2020 12:53)  T(F): 98.1 (27 Nov 2020 05:29), Max: 98.1 (26 Nov 2020 20:26)  HR: 78 (27 Nov 2020 05:29) (71 - 78)  BP: 130/81 (27 Nov 2020 05:29) (130/81 - 133/90)  BP(mean): --  RR: 18 (27 Nov 2020 05:29) (18 - 18)  SpO2: 94% (27 Nov 2020 05:29) (93% - 95%)    PHYSICAL EXAM:  GENERAL: NAD, well-developed, pleasant middle-aged man  HEAD:  Atraumatic, Normocephalic  EYES: EOMI, conjunctiva and sclera clear  NECK: Supple  CHEST/LUNG: Clear to auscultation bilaterally; No wheeze, ronchi or rales  HEART: Regular rate and rhythm; No murmurs, rubs, or gallops  ABDOMEN: Soft, Nontender, Nondistended; Bowel sounds present  EXTREMITIES:  2+ Peripheral Pulses, No clubbing, cyanosis, or edema  PSYCH: AAOx3  NEUROLOGY: non-focal  SKIN: No rashes or lesions, s/p sternotomy    LABS:                        9.5    9.82  )-----------( 180      ( 27 Nov 2020 05:50 )             28.3     11-27    135  |  101  |  14  ----------------------------<  88  3.8   |  24  |  0.97    Ca    8.3<L>      27 Nov 2020 05:50  Mg     2.0     11-26    TPro  5.8<L>  /  Alb  3.1<L>  /  TBili  1.3<H>  /  DBili  0.3<H>  /  AST  57<H>  /  ALT  58<H>  /  AlkPhos  38<L>  11-27    PT/INR - ( 27 Nov 2020 05:50 )   PT: 25.2 sec;   INR: 2.18 ratio         PTT - ( 27 Nov 2020 05:50 )  PTT:65.0 sec      RADIOLOGY & ADDITIONAL TESTS:    TELE: Aflutter    EKG:

## 2020-11-27 NOTE — DISCHARGE NOTE PROVIDER - CARE PROVIDER_API CALL
Darrell Sarmiento  THORACIC AND CARDIAC SURGERY  [address] [place] [address]  Phone: [phone]  Fax: [phone]  Follow Up Time:     Chaz Galvez)  Cardiac Electrophysiology; Cardiology  [address] [place] [id number]  Phone: [phone]  Fax: [phone]  Follow Up Time:     Gianluca Bush  CARDIOLOGY  [address] [place] [address], [id number] [place] [address]  Phone: [phone]  Fax: [phone]  Follow Up Time:

## 2020-11-27 NOTE — DISCHARGE NOTE PROVIDER - NSDCMRMEDTOKEN_GEN_ALL_CORE_FT
amLODIPine 10 mg oral tablet: 1 tab(s) orally once a day  cyclobenzaprine 5 mg oral tablet: 1 tab(s) orally 3 times a day, As Needed  losartan 100 mg oral tablet: 1 tab(s) orally once a day   aspirin 81 mg oral delayed release tablet: 1 tab(s) orally once a day  atorvastatin 40 mg oral tablet: 1 tab(s) orally once a day (at bedtime)  Coumadin 5 mg oral tablet: 1 tab(s) orally once a day - coumadin dose to be adjusted by    HYDROmorphone 2 mg oral tablet: 1 tab(s) orally every 6 hours, As Needed -Moderate Pain (4 - 6) MDD:4  polyethylene glycol 3350 oral powder for reconstitution: 17 gram(s) orally once a day  STAT PT/ INR levels : every monday and thursday at Dr. Bush&#x27;s office starting Monday 11/30

## 2020-11-27 NOTE — PROGRESS NOTE ADULT - ASSESSMENT
60 M with HTN, CAD s/p [id number] of SVG to RCA (11/21/2020), MVP with severe MR, s/p elective MV repair on 11/21/2020. Briefly after surgery patient had CHB, EP was consulted. Patient now converted to Aflutter since 11/25.    Aflutter:   - Anticoagulated on warfarin. Therapeutic at INR 2.18. Hep gtt now discontinued. [id number] = 2.  - Will monitor on telemetry if converts to sinus.

## 2020-11-27 NOTE — DISCHARGE NOTE PROVIDER - NSDCFUADDINST_GEN_ALL_CORE_FT
call Dr. Sarmiento for fever > 101  refer to cardiac surgery do and don't checklist  no driving until cleared by Dr. Sarmiento  coumadin bloodwork ( INR levels)every monday and thursday at Dr. Bush's office starting monday 11/30/2020  followup results of cardiac monitor ( [facility]OT) with Dr. Galvez in 2-4 weeks.

## 2020-11-27 NOTE — PROGRESS NOTE ADULT - SUBJECTIVE AND OBJECTIVE BOX
For all Cardiology service contact information, go to [email or web address] and use "Last 2 Left" to login.    SUBJECTIVE:   No events overnight. Denies CP, SOB or Dyspnea.   Tele:   -------------------------------------------------------------------------------------------  ROS:  CV: chest pain (-), palpitation (-), orthopnea (-), PND (-), edema (-)  PULM: SOB (-), cough (-), wheezing (-), hemoptysis (-).   CONST: fever (-), chills (-) or fatigue (-)  GI: abdominal distension (-), abdominal pain (-) , nausea/vomiting (-), hematemesis, (-), melena (-), hematochezia (-)  : dysuria (-), frequency (-), hematuria (-).   NEURO: numbness (-), weakness (-), dizziness (-)  SKIN: itching (-), rash (-)  HEENT:  visual changes (-); vertigo or throat pain (-);  neck stiffness (-)     All other review of systems is negative unless indicated above.   -------------------------------------------------------------------------------------------  VS:  T(F): 98.1 (11-27), Max: 98.1 (11-26)  HR: 78 (11-27) (71 - 78)  BP: 130/81 (11-27) (130/81 - 133/90)  RR: 18 (11-27)  SpO2: 94% (11-27)  I&O's Summary    26 Nov 2020 07:01 - 27 Nov 2020 07:00  --------------------------------------------------------  IN: 1449 mL / OUT: 1400 mL / NET: 49 mL    27 Nov 2020 07:01 - 27 Nov 2020 08:35  --------------------------------------------------------  IN: 0 mL / OUT: 0 mL / NET: 0 mL      ------------------------------------------------------------------------------------------  PHYSICAL EXAM:  GENERAL: NAD  HEAD:  Atraumatic, Normocephalic.  EYES: EOMI, PERRLA, conjunctiva and sclera clear.  ENT: Moist mucous membranes.  NECK: Supple, No JVD.  CHEST/LUNG: Clear to auscultation bilaterally; No rales, rhonchi, wheezing, or rubs. Unlabored respirations.  HEART: Regular rate and rhythm; No murmurs, rubs, or gallops.  ABDOMEN: Bowel sounds present; Soft, Nontender, Nondistended.   EXTREMITIES:  2+ Peripheral Pulses, brisk capillary refill. No clubbing, cyanosis, or edema.  PSYCH: Normal affect.  SKIN: No rashes or lesions.  -------------------------------------------------------------------------------------------  LABS:                          9.5    9.82  )-----------( 180      ( 27 Nov 2020 05:50 )             28.3     11-27    135  |  101  |  14  ----------------------------<  88  3.8   |  24  |  0.97    Ca    8.3<L>      27 Nov 2020 05:50  Mg     2.0     11-26    TPro  5.8<L>  /  Alb  3.1<L>  /  TBili  1.3<H>  /  DBili  0.3<H>  /  AST  57<H>  /  ALT  58<H>  /  AlkPhos  38<L>  11-27    PT/INR - ( 27 Nov 2020 05:50 )   PT: 25.2 sec;   INR: 2.18 ratio         PTT - ( 27 Nov 2020 05:50 )  PTT:65.0 sec  CARDIAC MARKERS ( 21 Nov 2020 12:39 )  713 ng/L / x     / x     / 557 U/L / x     / 63.2 ng/mL    -------------------------------------------------------------------------------------------  Meds:  ascorbic acid 500 milliGRAM(s) Oral daily  aspirin enteric coated 81 milliGRAM(s) Oral daily  atorvastatin 40 milliGRAM(s) Oral at bedtime  ferrous    sulfate 325 milliGRAM(s) Oral three times a day  folic acid 1 milliGRAM(s) Oral daily  HYDROmorphone   Tablet 2 milliGRAM(s) Oral every 3 hours PRN  HYDROmorphone   Tablet 4 milliGRAM(s) Oral every 4 hours PRN  melatonin 5 milliGRAM(s) Oral at bedtime  polyethylene glycol 3350 17 Gram(s) Oral daily  sodium chloride 0.9% lock flush 3 milliLiter(s) IV Push every 8 hours    ------------------------------------------------------------------------------------------- For all Cardiology service contact information, go to [email or web address] and use "Petta" to login.    SUBJECTIVE:   No events overnight. Denies CP, SOB or Dyspnea.   Tele: Atrial flutter with 4:1 conduction   -------------------------------------------------------------------------------------------  ROS:  CV: chest pain (-), palpitation (-), orthopnea (-), PND (-), edema (-)  PULM: SOB (-), cough (-), wheezing (-), hemoptysis (-).   CONST: fever (-), chills (-) or fatigue (-)  GI: abdominal distension (-), abdominal pain (-) , nausea/vomiting (-), hematemesis, (-), melena (-), hematochezia (-)  : dysuria (-), frequency (-), hematuria (-).   NEURO: numbness (-), weakness (-), dizziness (-)  SKIN: itching (-), rash (-)  HEENT:  visual changes (-); vertigo or throat pain (-);  neck stiffness (-)     All other review of systems is negative unless indicated above.   -------------------------------------------------------------------------------------------  VS:  T(F): 98.1 (11-27), Max: 98.1 (11-26)  HR: 78 (11-27) (71 - 78)  BP: 130/81 (11-27) (130/81 - 133/90)  RR: 18 (11-27)  SpO2: 94% (11-27)  I&O's Summary    26 Nov 2020 07:01 - 27 Nov 2020 07:00  --------------------------------------------------------  IN: 1449 mL / OUT: 1400 mL / NET: 49 mL    27 Nov 2020 07:01 - 27 Nov 2020 08:35  --------------------------------------------------------  IN: 0 mL / OUT: 0 mL / NET: 0 mL      ------------------------------------------------------------------------------------------  PHYSICAL EXAM:  GENERAL: NAD  HEAD:  Atraumatic, Normocephalic.  EYES: EOMI, PERRLA, conjunctiva and sclera clear.  ENT: Moist mucous membranes.  NECK: Supple, No JVD.  CHEST/LUNG: Clear to auscultation bilaterally; No rales, rhonchi, wheezing, or rubs. Unlabored respirations. Sternotomy site CDI  HEART: Regular rate and rhythm; No murmurs, rubs, or gallops.  ABDOMEN: Bowel sounds present; Soft, Nontender, Nondistended.   EXTREMITIES:  2+ Peripheral Pulses, brisk capillary refill. No clubbing, cyanosis, or edema.  PSYCH: Normal affect.  SKIN: No rashes or lesions.  -------------------------------------------------------------------------------------------  LABS:                          9.5    9.82  )-----------( 180      ( 27 Nov 2020 05:50 )             28.3     11-27    135  |  101  |  14  ----------------------------<  88  3.8   |  24  |  0.97    Ca    8.3<L>      27 Nov 2020 05:50  Mg     2.0     11-26    TPro  5.8<L>  /  Alb  3.1<L>  /  TBili  1.3<H>  /  DBili  0.3<H>  /  AST  57<H>  /  ALT  58<H>  /  AlkPhos  38<L>  11-27    PT/INR - ( 27 Nov 2020 05:50 )   PT: 25.2 sec;   INR: 2.18 ratio         PTT - ( 27 Nov 2020 05:50 )  PTT:65.0 sec  CARDIAC MARKERS ( 21 Nov 2020 12:39 )  713 ng/L / x     / x     / 557 U/L / x     / 63.2 ng/mL    -------------------------------------------------------------------------------------------  Meds:  ascorbic acid 500 milliGRAM(s) Oral daily  aspirin enteric coated 81 milliGRAM(s) Oral daily  atorvastatin 40 milliGRAM(s) Oral at bedtime  ferrous    sulfate 325 milliGRAM(s) Oral three times a day  folic acid 1 milliGRAM(s) Oral daily  HYDROmorphone   Tablet 2 milliGRAM(s) Oral every 3 hours PRN  HYDROmorphone   Tablet 4 milliGRAM(s) Oral every 4 hours PRN  melatonin 5 milliGRAM(s) Oral at bedtime  polyethylene glycol 3350 17 Gram(s) Oral daily  sodium chloride 0.9% lock flush 3 milliLiter(s) IV Push every 8 hours    -------------------------------------------------------------------------------------------

## 2020-11-27 NOTE — DISCHARGE NOTE PROVIDER - CARE PROVIDERS DIRECT ADDRESSES
[email or web address]

## 2020-11-27 NOTE — DISCHARGE NOTE PROVIDER - NSDCACTIVITY_GEN_ALL_CORE
Stairs allowed/Walking - Outdoors allowed/Do not drive or operate machinery/Sex allowed/Showering allowed/Do not make important decisions/Walking - Indoors allowed/No heavy lifting/straining

## 2020-11-27 NOTE — PROGRESS NOTE ADULT - SUBJECTIVE AND OBJECTIVE BOX
VITAL SIGNS    Telemetry:    Vital Signs Last 24 Hrs  T(C): 36.7 (20 @ 05:29), Max: 36.7 (20 @ 12:53)  T(F): 98.1 (20 @ 05:29), Max: 98.1 (20 @ 20:26)  HR: 78 (20 05:29) (71 - 78)  BP: 130/81 (20 @ 05:29) (130/81 - 133/90)  RR: 18 (20 @ 05:29) (18 - 18)  SpO2: 94% (20 @ 05:29) (93% - 95%)             @ 07:01  -   @ 07:00  --------------------------------------------------------  IN: 1449 mL / OUT: 1400 mL / NET: 49 mL     @ 07:01  -   @ 08:57  --------------------------------------------------------  IN: 0 mL / OUT: 0 mL / NET: 0 mL       Daily     Daily Weight in k.6 (2020 08:10)  Admit Wt: Drug Dosing Weight  Height (cm): 175.3 (2020 05:41)  Weight (kg): 76.8 (2020 05:41)  BMI (kg/m2): 25 (2020 05:41)  BSA (m2): 1.92 (2020 05:41)    Bilirubin Direct, Serum: 0.3 mg/dL ( @ 05:50)  Bilirubin Total, Serum: 1.3 mg/dL ( @ 05:50)    CAPILLARY BLOOD GLUCOSE              ascorbic acid 500 milliGRAM(s) Oral daily  aspirin enteric coated 81 milliGRAM(s) Oral daily  atorvastatin 40 milliGRAM(s) Oral at bedtime  ferrous    sulfate 325 milliGRAM(s) Oral three times a day  folic acid 1 milliGRAM(s) Oral daily  HYDROmorphone   Tablet 2 milliGRAM(s) Oral every 3 hours PRN  HYDROmorphone   Tablet 4 milliGRAM(s) Oral every 4 hours PRN  melatonin 5 milliGRAM(s) Oral at bedtime  polyethylene glycol 3350 17 Gram(s) Oral daily  sodium chloride 0.9% lock flush 3 milliLiter(s) IV Push every 8 hours      PHYSICAL EXAM    Subjective: "Hi.   Neurology: alert and oriented x 3, nonfocal, no gross deficits  CV : tele:  RSR  Sternal Wound :  CDI with dressing , Stable  Lungs: clear. RR easy, unlabored   Abdomen: soft, nontender, nondistended, positive bowel sounds, bowel movement   Neg N/V/D   :  pt voiding without difficulty   Extremities:   SAUCEDO; edema, neg calf tenderness.   PPP bilaterally      PW:  Chest tubes:
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
## 2020-11-27 NOTE — DISCHARGE NOTE NURSING/CASE MANAGEMENT/SOCIAL WORK - NSDCPEEMAIL_GEN_ALL_CORE
[facility] for Tobacco Control email [email or web address]

## 2020-11-27 NOTE — PROGRESS NOTE ADULT - PROBLEM SELECTOR PROBLEM 3
Hepatitis C
Hepatitis C
Junctional cardiac arrhythmia
Coronary artery disease involving native coronary artery of native heart without angina pectoris
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
## 2020-11-27 NOTE — PROGRESS NOTE ADULT - ATTENDING COMMENTS
seen and examined with fellow. I agree with H & P, A & P.  Conduction improving.
seen and examined with fellow. I agree with H & P, A & P.
seen and examined with Resident.  I agree with H & P, A & P.  Conduction improving. HOpefully can avoid PPM

## 2020-11-27 NOTE — DISCHARGE NOTE NURSING/CASE MANAGEMENT/SOCIAL WORK - PATIENT PORTAL LINK FT
You can access the FollowMyHealth Patient Portal offered by [facility] by registering at the following website: [email or web address]. By joining [employer]’s FollowMyHealth portal, you will also be able to view your health information using other applications (apps) compatible with our system.
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
## 2020-11-27 NOTE — DISCHARGE NOTE PROVIDER - PROVIDER TOKENS
PROVIDER:[TOKEN:[[id number]]],PROVIDER:[TOKEN:[[id number]]],PROVIDER:[TOKEN:[[id number]]]

## 2020-11-27 NOTE — PROGRESS NOTE ADULT - NUTRITIONAL ASSESSMENT
This patient has been assessed with a concern for Malnutrition and has been determined to have a diagnosis/diagnoses of Moderate protein-calorie malnutrition.    This patient is being managed with:   Diet NPO after Midnight-     NPO Start Date: 26-Nov-2020   NPO Start Time: 23:59  Entered: Nov 26 2020  8:26AM    Diet Regular-  Entered: Nov 22 2020 10:22AM    
This patient has been assessed with a concern for Malnutrition and has been determined to have a diagnosis/diagnoses of Moderate protein-calorie malnutrition.    This patient is being managed with:   Diet Regular-  Entered: Nov 22 2020 10:22AM    
This patient has been assessed with a concern for Malnutrition and has been determined to have a diagnosis/diagnoses of Moderate protein-calorie malnutrition.    This patient is being managed with:   Diet Regular-  Entered: Nov 22 2020 10:22AM    
This patient has been assessed with a concern for Malnutrition and has been determined to have a diagnosis/diagnoses of Moderate protein-calorie malnutrition.    This patient is being managed with:   Diet NPO after Midnight-     NPO Start Date: 26-Nov-2020   NPO Start Time: 23:59  Entered: Nov 26 2020  8:26AM    Diet Regular-  Entered: Nov 22 2020 10:22AM    

## 2020-11-27 NOTE — DISCHARGE NOTE PROVIDER - REASON FOR ADMISSION
s/p 11/21/2020 open heart surgery: mitral valve repair and cabg- 1 vein s/p 11/21/2020 mitral valve repair/ cabg- single bypass / WILBER closure

## 2020-11-27 NOTE — DISCHARGE NOTE PROVIDER - NSDCPNSUBOBJ_GEN_ALL_CORE
VSS  aflutter 70  midsternal incision cdi dieudonne- sternum stable  lungs clear, RR easy unlabored  + bs nt nd + bm; neg n/v/d  LE: neg calf tenderness, neg edema, ppp bilaterally, rt svg site cdi dieudonne    Discharge pt home today 11/27 after MCOT applied as per EP and Dr. Sarmiento
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
## 2020-11-27 NOTE — DISCHARGE NOTE NURSING/CASE MANAGEMENT/SOCIAL WORK - NSDCPEWEB_GEN_ALL_CORE
[place] website --- [email or web address] [email or web address] Tobacco [email or web address] website --- [email or web address]

## 2020-11-27 NOTE — PROGRESS NOTE ADULT - ASSESSMENT
60M with HTN, CAD with CABG x 1 (saphenous vein graft to RCA for 60% stenosis), hx of MVP with severe MR s/p elective MVR on 11/21 with prosthetic ring c/b post-operative junctional tachycardia, now in atrial flutter 11/27    1. Atrial flutter   2. Post-operative AV conduction delay  3. CAD     Plan:      60M with HTN, CAD with CABG x 1 (saphenous vein graft to RCA for 60% stenosis), hx of MVP with severe MR s/p elective MVR on 11/21 with prosthetic ring c/b post-operative junctional tachycardia, currently in atrial flutter 4:1 conduction.       1. Atrial flutter   2. Post-operative AV conduction delay  3. CAD     Plan:   continue aspirin, warfarin be level   would defer cardioversion at this time - arrange for outpatient follow-up with Dr. Galvez in 2-3 weeks.

## 2020-11-28 ENCOUNTER — TRANSCRIPTION ENCOUNTER (OUTPATIENT)
Age: 60
End: 2020-11-28

## 2020-11-30 ENCOUNTER — NON-APPOINTMENT (OUTPATIENT)
Age: 60
End: 2020-11-30

## 2020-11-30 ENCOUNTER — APPOINTMENT (OUTPATIENT)
Dept: CARDIOLOGY | Facility: CLINIC | Age: 60
End: 2020-11-30
Payer: COMMERCIAL

## 2020-11-30 PROCEDURE — 36415 COLL VENOUS BLD VENIPUNCTURE: CPT

## 2020-11-30 PROCEDURE — 99072 ADDL SUPL MATRL&STAF TM PHE: CPT

## 2020-11-30 RX ORDER — LORATADINE 5 MG
TABLET,CHEWABLE ORAL
Refills: 0 | Status: ACTIVE | COMMUNITY

## 2020-12-01 ENCOUNTER — APPOINTMENT (OUTPATIENT)
Dept: CARE COORDINATION | Facility: HOME HEALTH | Age: 60
End: 2020-12-01
Payer: COMMERCIAL

## 2020-12-01 VITALS
OXYGEN SATURATION: 98 % | HEART RATE: 76 BPM | DIASTOLIC BLOOD PRESSURE: 78 MMHG | SYSTOLIC BLOOD PRESSURE: 120 MMHG | RESPIRATION RATE: 18 BRPM

## 2020-12-01 DIAGNOSIS — F17.210 NICOTINE DEPENDENCE, CIGARETTES, UNCOMPLICATED: ICD-10-CM

## 2020-12-01 DIAGNOSIS — B19.20 UNSPECIFIED VIRAL HEPATITIS C W/OUT HEPATIC COMA: ICD-10-CM

## 2020-12-01 DIAGNOSIS — L76.82 OTHER POSTPROCEDURAL COMPLICATIONS OF SKIN AND SUBCUTANEOUS TISSUE: ICD-10-CM

## 2020-12-01 DIAGNOSIS — F17.200 NICOTINE DEPENDENCE, UNSPECIFIED, UNCOMPLICATED: ICD-10-CM

## 2020-12-01 DIAGNOSIS — Z86.19 PERSONAL HISTORY OF OTHER INFECTIOUS AND PARASITIC DISEASES: ICD-10-CM

## 2020-12-01 LAB
INR PPP: 2.45 RATIO
PT BLD: 27.9 SEC

## 2020-12-01 PROCEDURE — 99024 POSTOP FOLLOW-UP VISIT: CPT

## 2020-12-01 NOTE — ADDENDUM
[FreeTextEntry1] : spoke to pt PCP Dr Detweiler\par was told pt was lost to follow up over 1 year encouraged pt follow up this month when able. He was also asking for op report and hospital summary be faxed over to him so he can be updated regarding the pt hospital course.\litzy \lizty paperwork to be faxed over to
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
## 2020-12-01 NOTE — REVIEW OF SYSTEMS
[Arthralgias] : arthralgias [Sleep Disturbances] : sleep disturbances [Negative] : Heme/Lymph [FreeTextEntry9] : sternal pain, back pain [de-identified] : a

## 2020-12-01 NOTE — HISTORY OF PRESENT ILLNESS
[FreeTextEntry1] : FOLLOW YOUR HEART HOME VISIT-[facility]\par Home Visit made [place] [facility] ] . A  patient of Dr Shay Sarmiento S/P ACABGx1/MV repair   on 11/21. Discharged  from [facility]\par \par TCM COVID-19 screening protocol reviewed with patient and/or caregiver and denies any signs and symptoms.  Patient and/or care giver denies any contact with a suspect or known COVID-19 case within the last 14 days.  Patient and/or caregiver have tested negative for COVID-19. Patient and/or caregiver does not live in an assisted living or skilled nursing facility.  Patient and/or care giver [place]. \par \par \par Visiting patient for post discharge transitional care management and post surgical follow up. \par Arrived to pt home, he was accompanied by his wife who was on her way to work.  Pt appears well, in no distress\par He states concern around running out of pain medication.  Asking for a refill of hydrocodone , only 2 pills left in botltle.  States he is still having a lot of sternal pain and taking hydrocodone to assist.\par also taking ambien for sleep.  Using a script that was prescribed to his wife.\par \par craving cigarette but had not had one since before hospital admission. states he can continued without smoking cigarettes without assistance\par

## 2020-12-01 NOTE — PHYSICAL EXAM
[Sclera] : the sclera and conjunctiva were normal [Neck Appearance] : the appearance of the neck was normal [] : the neck was supple [Respiration, Rhythm And Depth] : normal respiratory rhythm and effort [Auscultation Breath Sounds / Voice Sounds] : lungs were clear to auscultation bilaterally [Apical Impulse] : the apical impulse was normal [Irregularly Irregular] : the rhythm was irregularly irregular [1+] : left 1+ [Bowel Sounds] : normal bowel sounds [Abdomen Soft] : soft [Abnormal Walk] : normal gait [Skin Color & Pigmentation] : normal skin color and pigmentation [No Focal Deficits] : no focal deficits [Oriented To Time, Place, And Person] : oriented to person, place, and time [FreeTextEntry1] : MTI-approximated , CT sites with black purse string sutures

## 2020-12-04 ENCOUNTER — NON-APPOINTMENT (OUTPATIENT)
Age: 60
End: 2020-12-04

## 2020-12-07 ENCOUNTER — LABORATORY RESULT (OUTPATIENT)
Age: 60
End: 2020-12-07

## 2020-12-08 ENCOUNTER — APPOINTMENT (OUTPATIENT)
Dept: CARDIOTHORACIC SURGERY | Facility: CLINIC | Age: 60
End: 2020-12-08
Payer: COMMERCIAL

## 2020-12-08 VITALS
HEIGHT: 69.5 IN | RESPIRATION RATE: 14 BRPM | OXYGEN SATURATION: 99 % | HEART RATE: 95 BPM | TEMPERATURE: 97.5 F | SYSTOLIC BLOOD PRESSURE: 107 MMHG | BODY MASS INDEX: 24.47 KG/M2 | DIASTOLIC BLOOD PRESSURE: 74 MMHG | WEIGHT: 169 LBS

## 2020-12-08 PROCEDURE — 99024 POSTOP FOLLOW-UP VISIT: CPT

## 2020-12-09 ENCOUNTER — APPOINTMENT (OUTPATIENT)
Dept: CARDIOLOGY | Facility: CLINIC | Age: 60
End: 2020-12-09
Payer: COMMERCIAL

## 2020-12-09 VITALS
TEMPERATURE: 96.8 F | HEART RATE: 125 BPM | BODY MASS INDEX: 24.9 KG/M2 | WEIGHT: 172 LBS | HEIGHT: 69.5 IN | DIASTOLIC BLOOD PRESSURE: 76 MMHG | SYSTOLIC BLOOD PRESSURE: 122 MMHG | OXYGEN SATURATION: 100 %

## 2020-12-09 PROCEDURE — 93000 ELECTROCARDIOGRAM COMPLETE: CPT

## 2020-12-09 PROCEDURE — 99072 ADDL SUPL MATRL&STAF TM PHE: CPT

## 2020-12-09 PROCEDURE — 99215 OFFICE O/P EST HI 40 MIN: CPT

## 2020-12-09 RX ORDER — HYDROMORPHONE HYDROCHLORIDE 2 MG/1
2 TABLET ORAL EVERY 6 HOURS
Qty: 20 | Refills: 0 | Status: COMPLETED | COMMUNITY
Start: 2020-12-01 | End: 2020-12-09

## 2020-12-09 RX ORDER — HYDROMORPHONE HYDROCHLORIDE 2 MG/1
2 TABLET ORAL EVERY 6 HOURS
Qty: 28 | Refills: 0 | Status: ACTIVE | COMMUNITY
Start: 2020-12-09 | End: 1900-01-01

## 2020-12-09 RX ORDER — HYDROMORPHONE HYDROCHLORIDE 2 MG/1
2 TABLET ORAL EVERY 6 HOURS
Qty: 28 | Refills: 0 | Status: COMPLETED | COMMUNITY
End: 2020-12-09

## 2020-12-10 ENCOUNTER — APPOINTMENT (OUTPATIENT)
Dept: ELECTROPHYSIOLOGY | Facility: CLINIC | Age: 60
End: 2020-12-10
Payer: COMMERCIAL

## 2020-12-10 ENCOUNTER — NON-APPOINTMENT (OUTPATIENT)
Age: 60
End: 2020-12-10

## 2020-12-10 VITALS
SYSTOLIC BLOOD PRESSURE: 119 MMHG | HEIGHT: 69.5 IN | WEIGHT: 172 LBS | TEMPERATURE: 97 F | DIASTOLIC BLOOD PRESSURE: 85 MMHG | HEART RATE: 79 BPM | RESPIRATION RATE: 14 BRPM | OXYGEN SATURATION: 100 % | BODY MASS INDEX: 24.9 KG/M2

## 2020-12-10 PROCEDURE — 93000 ELECTROCARDIOGRAM COMPLETE: CPT

## 2020-12-10 PROCEDURE — 99215 OFFICE O/P EST HI 40 MIN: CPT

## 2020-12-10 PROCEDURE — 99072 ADDL SUPL MATRL&STAF TM PHE: CPT

## 2020-12-10 NOTE — DISCUSSION/SUMMARY
[FreeTextEntry1] : Felice Schaefer is a 61y/o man with Hx of HTN, MVP and CAD s/p CABG x2 and MV repair on 11/21/2020 c/b post operative afib who presents today for initial evaluation. \par \par Impression:\par \par 1. Persistent afib: EKG today afib. Given persistent afib post CABG, discussed treatment options such as rate control vs DCCV vs ablation. Given young age, prefer to avoid long term afib. Recommend undergoing PVI ablation. Risks, benefits, and alternatives to procedure discussed at length. Risks including that of bleeding, infection, stroke, and cardiac tamponade discussed and he verbalizes understanding of all.\par \par 2. HLD: resume statin therapy as prescribed and regular f/u with Cardiologist for routine lipid monitoring and management.\par \par 3. CAD: s/p CABG. No active chest pain, healing well. \par \par Plan for afib ablation.

## 2020-12-10 NOTE — PHYSICAL EXAM
[General Appearance - Well Developed] : well developed [Normal Appearance] : normal appearance [Well Groomed] : well groomed [General Appearance - Well Nourished] : well nourished [No Deformities] : no deformities [General Appearance - In No Acute Distress] : no acute distress [Normal Conjunctiva] : the conjunctiva exhibited no abnormalities [Eyelids - No Xanthelasma] : the eyelids demonstrated no xanthelasmas [Normal Oral Mucosa] : normal oral mucosa [No Oral Pallor] : no oral pallor [No Oral Cyanosis] : no oral cyanosis [Normal Jugular Venous A Waves Present] : normal jugular venous A waves present [Normal Jugular Venous V Waves Present] : normal jugular venous V waves present [No Jugular Venous Luong A Waves] : no jugular venous luong A waves [Heart Sounds] : normal S1 and S2 [Murmurs] : no murmurs present [Respiration, Rhythm And Depth] : normal respiratory rhythm and effort [Exaggerated Use Of Accessory Muscles For Inspiration] : no accessory muscle use [Auscultation Breath Sounds / Voice Sounds] : lungs were clear to auscultation bilaterally [Abdomen Soft] : soft [Abdomen Tenderness] : non-tender [Abdomen Mass (___ Cm)] : no abdominal mass palpated [Abnormal Walk] : normal gait [Gait - Sufficient For Exercise Testing] : the gait was sufficient for exercise testing [Nail Clubbing] : no clubbing of the fingernails [Cyanosis, Localized] : no localized cyanosis [Petechial Hemorrhages (___cm)] : no petechial hemorrhages [Skin Color & Pigmentation] : normal skin color and pigmentation [] : no rash [No Venous Stasis] : no venous stasis [Skin Lesions] : no skin lesions [No Skin Ulcers] : no skin ulcer [No Xanthoma] : no  xanthoma was observed [Oriented To Time, Place, And Person] : oriented to person, place, and time [Affect] : the affect was normal [Mood] : the mood was normal [No Anxiety] : not feeling anxious [FreeTextEntry1] : irregular rate/rhythm

## 2020-12-10 NOTE — HISTORY OF PRESENT ILLNESS
[FreeTextEntry1] : Darrell Sarmiento MD\par \par Felice Schaefer is a 59y/o man with Hx of HTN, MVP and CAD s/p CABG x2 and MV repair on 11/21/2020 c/b post operative afib who presents today for initial evaluation. Admits doing well post CT surgery, requiring less pain medication and bale to cough and deep breath without issue. On warfarin with no bleeding. Still taking it easy, resting often. Denies chest pain, palpitations, SOB, syncope or near syncope.\par

## 2020-12-13 NOTE — REASON FOR VISIT
[FreeTextEntry1] : Felice Schaefer is a 61y/o man with Hx of HTN, MVP and CAD s/p CABG x2 and MV repair on 11/21/2020 c/b post operative afib who presents today for initial evaluation. Admits doing well post CT surgery, requiring less pain medication and bale to cough and deep breath without issue. On warfarin with no bleeding. Still taking it easy, resting often. Denies chest pain, palpitations, SOB, syncope or near syncope.\par \par  \par Cardiology Summary\par \par Echo: 11/18/2020, CONCLUSIONS:1. 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 Normal trileaflet aortic valve.3. Normal aortic root, aortic arch and descending thoracicaorta.4. Left atrial enlargement. No left atrial or left atrialappendage thrombus.5. Normal left ventricular systolic function. No segmentalwall motion abnormalities.6. Normal right ventricular size and function.7. Agitated saline injection demonstrates no evidence of apatent foramen ovale. \par Cardiac Cath: 11/19/2020, CORONARY VESSELS: The coronary circulation is right dominant. LM: -- LM: Normal. LAD: -- Proximal LAD: There was a 20 % stenosis. CX: -- OM1: Angiography showed moderate atherosclerosis. RCA: -- Proximal RCA: There was a 60 % stenosis. \par

## 2020-12-17 ENCOUNTER — LABORATORY RESULT (OUTPATIENT)
Age: 60
End: 2020-12-17

## 2020-12-24 ENCOUNTER — LABORATORY RESULT (OUTPATIENT)
Age: 60
End: 2020-12-24

## 2020-12-29 ENCOUNTER — APPOINTMENT (OUTPATIENT)
Dept: ELECTROPHYSIOLOGY | Facility: CLINIC | Age: 60
End: 2020-12-29

## 2020-12-29 ENCOUNTER — TRANSCRIPTION ENCOUNTER (OUTPATIENT)
Age: 60
End: 2020-12-29

## 2020-12-30 ENCOUNTER — OUTPATIENT (OUTPATIENT)
Dept: OUTPATIENT SERVICES | Facility: HOSPITAL | Age: 60
LOS: 1 days | End: 2020-12-30

## 2020-12-30 VITALS
TEMPERATURE: 98 F | HEIGHT: 69 IN | HEART RATE: 60 BPM | DIASTOLIC BLOOD PRESSURE: 92 MMHG | RESPIRATION RATE: 14 BRPM | SYSTOLIC BLOOD PRESSURE: 137 MMHG | WEIGHT: 167.99 LBS | OXYGEN SATURATION: 98 %

## 2020-12-30 DIAGNOSIS — Z98.890 OTHER SPECIFIED POSTPROCEDURAL STATES: Chronic | ICD-10-CM

## 2020-12-30 DIAGNOSIS — K92.2 GASTROINTESTINAL HEMORRHAGE, UNSPECIFIED: ICD-10-CM

## 2020-12-30 DIAGNOSIS — Z95.1 PRESENCE OF AORTOCORONARY BYPASS GRAFT: Chronic | ICD-10-CM

## 2020-12-30 NOTE — H&P PST ADULT - NEGATIVE SKIN SYMPTOMS
healing of chest and RLE incision from CABG/no rash/no itching/no dryness/no change in size/color of mole

## 2020-12-30 NOTE — H&P PST ADULT - NSICDXPASTMEDICALHX_GEN_ALL_CORE_FT
PAST MEDICAL HISTORY:  Atrial fibrillation     CAD (coronary artery disease)     Hepatitis C     HLD (hyperlipidemia)     HTN (hypertension) since CABG and mitral valve repair no longer on medications    MVP (mitral valve prolapse)

## 2020-12-30 NOTE — H&P PST ADULT - NEGATIVE ENMT SYMPTOMS
no hearing difficulty/no ear pain/no tinnitus/no nasal congestion/no dry mouth/no throat pain/no dysphagia

## 2020-12-30 NOTE — H&P PST ADULT - ANESTHESIA, PREVIOUS REACTION, PROFILE
Very sensitive GAG reflex, kept waking up during CABG; Denies family history of malignant hyperthermia

## 2020-12-30 NOTE — H&P PST ADULT - HISTORY OF PRESENT ILLNESS
60 year old male with PMH of CAD s/p CABG x1, HTN, HLD, MVP s/p mitral valve repair 11/2020, atrial fibrillation on coumadin , currently has a [employer] telemetry device in place but states it doesn't work now scheduled for complex ablation

## 2020-12-30 NOTE — H&P PST ADULT - ASSESSMENT
Problem: atrial ablation  Assessment and Plan: Patient scheduled for surgery on 1/8/2020  Patient provided with verbal and written presurgical instructions; verbalized understanding  with teach back.    Patient provided with famotidine for GI prophylaxis; verbalized understanding.    Patient provided with Chlorhexidine wash, verbal and written instructions reviewed. Patient demonstrated understanding with teach back.   Patient confirmed COVID appointment     STOP BANG score met, OR booking notified  OR booking notified of implants     EKG, Echo and Stress test in chart     Problem: A. Fib   Assessment and Plan: Patient instructed to take coumadin as instructed by surgeon office

## 2020-12-30 NOTE — H&P PST ADULT - NSALCOHOLAMT_GEN_A_CORE_SD
60 yr o woman with metatastic carcinoid s/p Ex Lap on 1/25/19 with gastro-gastrostomy, pyloroplasty, gastrotomy closure, enteric resection with side to side anastomosis and restoration of small bowel continuity was performed (s/p reversal of Sandeep-en-Y). She was admitted to the  [facility] on 2/26 for  wound care/wound vac, nutrition, TPN and TFs, PT/OT/ST, O2 weaning, and IV antibiotics. The only issue in the LTAC was hypoglycemia attributed to a combination of her decreased appetite from N/V from metastatic ca, multiple extensive GI re-routing surgeries, and TF at night time, reducing her appetite during the day.     Patient ransferred to [facility] with PT and OT to improve functional status and ability to perform ADLs.        1-2 drinks

## 2020-12-30 NOTE — H&P PST ADULT - NSANTHOSAYNRD_GEN_A_CORE
No. KAPIL screening performed.  STOP BANG Legend: 0-2 = LOW Risk; 3-4 = INTERMEDIATE Risk; 5-8 = HIGH Risk

## 2020-12-31 ENCOUNTER — LABORATORY RESULT (OUTPATIENT)
Age: 60
End: 2020-12-31

## 2020-12-31 LAB
ALBUMIN SERPL ELPH-MCNC: 4.2 G/DL — SIGNIFICANT CHANGE UP (ref 3.3–5)
ALP SERPL-CCNC: 97 U/L — SIGNIFICANT CHANGE UP (ref 40–120)
ALT FLD-CCNC: 35 U/L — SIGNIFICANT CHANGE UP (ref 10–45)
ANION GAP SERPL CALC-SCNC: 19 MMOL/L — HIGH (ref 5–17)
APTT BLD: 37.8 SEC — HIGH (ref 27.5–35.5)
AST SERPL-CCNC: 32 U/L — SIGNIFICANT CHANGE UP (ref 10–40)
BILIRUB SERPL-MCNC: 1 MG/DL — SIGNIFICANT CHANGE UP (ref 0.2–1.2)
BLD GP AB SCN SERPL QL: NEGATIVE — SIGNIFICANT CHANGE UP
BUN SERPL-MCNC: 17 MG/DL — SIGNIFICANT CHANGE UP (ref 7–23)
CALCIUM SERPL-MCNC: 9.2 MG/DL — SIGNIFICANT CHANGE UP (ref 8.4–10.5)
CHLORIDE SERPL-SCNC: 104 MMOL/L — SIGNIFICANT CHANGE UP (ref 96–108)
CO2 SERPL-SCNC: 20 MMOL/L — LOW (ref 22–31)
CREAT SERPL-MCNC: 1.46 MG/DL — HIGH (ref 0.5–1.3)
GLUCOSE SERPL-MCNC: 24 MG/DL — CRITICAL LOW (ref 70–99)
HCT VFR BLD CALC: 44.7 % — SIGNIFICANT CHANGE UP (ref 39–50)
HGB BLD-MCNC: 14.2 G/DL — SIGNIFICANT CHANGE UP (ref 13–17)
INR BLD: 1.64 RATIO — HIGH (ref 0.88–1.16)
MCHC RBC-ENTMCNC: 27.8 PG — SIGNIFICANT CHANGE UP (ref 27–34)
MCHC RBC-ENTMCNC: 31.8 GM/DL — LOW (ref 32–36)
MCV RBC AUTO: 87.6 FL — SIGNIFICANT CHANGE UP (ref 80–100)
PLATELET # BLD AUTO: 236 K/UL — SIGNIFICANT CHANGE UP (ref 150–400)
POTASSIUM SERPL-MCNC: 4 MMOL/L — SIGNIFICANT CHANGE UP (ref 3.5–5.3)
POTASSIUM SERPL-SCNC: 4 MMOL/L — SIGNIFICANT CHANGE UP (ref 3.5–5.3)
PROT SERPL-MCNC: 7.3 G/DL — SIGNIFICANT CHANGE UP (ref 6–8.3)
PROTHROM AB SERPL-ACNC: 19.1 SEC — HIGH (ref 10.6–13.6)
RBC # BLD: 5.1 M/UL — SIGNIFICANT CHANGE UP (ref 4.2–5.8)
RBC # FLD: 14.6 % — HIGH (ref 10.3–14.5)
RH IG SCN BLD-IMP: POSITIVE — SIGNIFICANT CHANGE UP
SODIUM SERPL-SCNC: 143 MMOL/L — SIGNIFICANT CHANGE UP (ref 135–145)
WBC # BLD: 9.27 K/UL — SIGNIFICANT CHANGE UP (ref 3.8–10.5)
WBC # FLD AUTO: 9.27 K/UL — SIGNIFICANT CHANGE UP (ref 3.8–10.5)

## 2021-01-04 DIAGNOSIS — Z01.818 ENCOUNTER FOR OTHER PREPROCEDURAL EXAMINATION: ICD-10-CM

## 2021-01-05 ENCOUNTER — APPOINTMENT (OUTPATIENT)
Dept: DISASTER EMERGENCY | Facility: CLINIC | Age: 61
End: 2021-01-05

## 2021-01-05 PROBLEM — I25.10 ATHEROSCLEROTIC HEART DISEASE OF NATIVE CORONARY ARTERY WITHOUT ANGINA PECTORIS: Chronic | Status: ACTIVE | Noted: 2020-12-30

## 2021-01-05 PROBLEM — I10 ESSENTIAL (PRIMARY) HYPERTENSION: Chronic | Status: ACTIVE | Noted: 2020-11-13

## 2021-01-05 PROBLEM — B19.20 UNSPECIFIED VIRAL HEPATITIS C WITHOUT HEPATIC COMA: Chronic | Status: ACTIVE | Noted: 2020-12-30

## 2021-01-05 PROBLEM — I48.91 UNSPECIFIED ATRIAL FIBRILLATION: Chronic | Status: ACTIVE | Noted: 2020-12-30

## 2021-01-05 PROBLEM — E78.5 HYPERLIPIDEMIA, UNSPECIFIED: Chronic | Status: ACTIVE | Noted: 2020-12-30

## 2021-01-08 ENCOUNTER — NON-APPOINTMENT (OUTPATIENT)
Age: 61
End: 2021-01-08

## 2021-01-11 ENCOUNTER — LABORATORY RESULT (OUTPATIENT)
Age: 61
End: 2021-01-11

## 2021-01-22 ENCOUNTER — NON-APPOINTMENT (OUTPATIENT)
Age: 61
End: 2021-01-22

## 2021-01-25 ENCOUNTER — LABORATORY RESULT (OUTPATIENT)
Age: 61
End: 2021-01-25

## 2021-02-04 ENCOUNTER — LABORATORY RESULT (OUTPATIENT)
Age: 61
End: 2021-02-04

## 2021-02-15 ENCOUNTER — LABORATORY RESULT (OUTPATIENT)
Age: 61
End: 2021-02-15

## 2021-02-16 ENCOUNTER — APPOINTMENT (OUTPATIENT)
Dept: DISASTER EMERGENCY | Facility: CLINIC | Age: 61
End: 2021-02-16
Payer: COMMERCIAL

## 2021-02-16 PROCEDURE — 85610 PROTHROMBIN TIME: CPT | Mod: QW

## 2021-02-16 PROCEDURE — 93793 ANTICOAG MGMT PT WARFARIN: CPT

## 2021-02-16 PROCEDURE — 99072 ADDL SUPL MATRL&STAF TM PHE: CPT

## 2021-02-17 LAB — SARS-COV-2 N GENE NPH QL NAA+PROBE: NOT DETECTED

## 2021-02-19 ENCOUNTER — OUTPATIENT (OUTPATIENT)
Dept: OUTPATIENT SERVICES | Facility: HOSPITAL | Age: 61
LOS: 1 days | Discharge: HOME | End: 2021-02-19
Payer: COMMERCIAL

## 2021-02-19 DIAGNOSIS — Z98.890 OTHER SPECIFIED POSTPROCEDURAL STATES: Chronic | ICD-10-CM

## 2021-02-19 DIAGNOSIS — Z95.1 PRESENCE OF AORTOCORONARY BYPASS GRAFT: Chronic | ICD-10-CM

## 2021-02-19 DIAGNOSIS — I48.19 OTHER PERSISTENT ATRIAL FIBRILLATION: ICD-10-CM

## 2021-02-19 LAB
ANION GAP SERPL CALC-SCNC: 10 MMOL/L — SIGNIFICANT CHANGE UP (ref 7–14)
BLD GP AB SCN SERPL QL: NEGATIVE — SIGNIFICANT CHANGE UP
BUN SERPL-MCNC: 19 MG/DL — SIGNIFICANT CHANGE UP (ref 7–23)
CALCIUM SERPL-MCNC: 8.8 MG/DL — SIGNIFICANT CHANGE UP (ref 8.4–10.5)
CHLORIDE SERPL-SCNC: 105 MMOL/L — SIGNIFICANT CHANGE UP (ref 98–107)
CO2 SERPL-SCNC: 26 MMOL/L — SIGNIFICANT CHANGE UP (ref 22–31)
CREAT SERPL-MCNC: 1.24 MG/DL — SIGNIFICANT CHANGE UP (ref 0.5–1.3)
GLUCOSE SERPL-MCNC: 83 MG/DL — SIGNIFICANT CHANGE UP (ref 70–99)
HCT VFR BLD CALC: 43.2 % — SIGNIFICANT CHANGE UP (ref 39–50)
HGB BLD-MCNC: 13.9 G/DL — SIGNIFICANT CHANGE UP (ref 13–17)
INR BLD: 1.79 RATIO — HIGH (ref 0.88–1.16)
MCHC RBC-ENTMCNC: 26.9 PG — LOW (ref 27–34)
MCHC RBC-ENTMCNC: 32.2 GM/DL — SIGNIFICANT CHANGE UP (ref 32–36)
MCV RBC AUTO: 83.6 FL — SIGNIFICANT CHANGE UP (ref 80–100)
NRBC # BLD: 0 /100 WBCS — SIGNIFICANT CHANGE UP
NRBC # FLD: 0 K/UL — SIGNIFICANT CHANGE UP
PLATELET # BLD AUTO: 190 K/UL — SIGNIFICANT CHANGE UP (ref 150–400)
POTASSIUM SERPL-MCNC: 4 MMOL/L — SIGNIFICANT CHANGE UP (ref 3.5–5.3)
POTASSIUM SERPL-SCNC: 4 MMOL/L — SIGNIFICANT CHANGE UP (ref 3.5–5.3)
PROTHROM AB SERPL-ACNC: 19.8 SEC — HIGH (ref 10.6–13.6)
RBC # BLD: 5.17 M/UL — SIGNIFICANT CHANGE UP (ref 4.2–5.8)
RBC # FLD: 15.8 % — HIGH (ref 10.3–14.5)
RH IG SCN BLD-IMP: POSITIVE — SIGNIFICANT CHANGE UP
SODIUM SERPL-SCNC: 141 MMOL/L — SIGNIFICANT CHANGE UP (ref 135–145)
WBC # BLD: 7.9 K/UL — SIGNIFICANT CHANGE UP (ref 3.8–10.5)
WBC # FLD AUTO: 7.9 K/UL — SIGNIFICANT CHANGE UP (ref 3.8–10.5)

## 2021-02-19 PROCEDURE — 93010 ELECTROCARDIOGRAM REPORT: CPT | Mod: 59

## 2021-02-19 PROCEDURE — 93662 INTRACARDIAC ECG (ICE): CPT | Mod: 26

## 2021-02-19 PROCEDURE — 93010 ELECTROCARDIOGRAM REPORT: CPT | Mod: 77

## 2021-02-19 PROCEDURE — 93613 INTRACARDIAC EPHYS 3D MAPG: CPT

## 2021-02-19 PROCEDURE — 93656 COMPRE EP EVAL ABLTJ ATR FIB: CPT

## 2021-02-19 PROCEDURE — 93657 TX L/R ATRIAL FIB ADDL: CPT

## 2021-02-19 RX ORDER — PANTOPRAZOLE SODIUM 20 MG/1
1 TABLET, DELAYED RELEASE ORAL
Qty: 30 | Refills: 0
Start: 2021-02-19 | End: 2021-03-20

## 2021-02-19 RX ORDER — WARFARIN SODIUM 2.5 MG/1
6 TABLET ORAL ONCE
Refills: 0 | Status: COMPLETED | OUTPATIENT
Start: 2021-02-19 | End: 2021-02-19

## 2021-02-19 RX ORDER — ASPIRIN/CALCIUM CARB/MAGNESIUM 324 MG
1 TABLET ORAL
Qty: 0 | Refills: 0 | DISCHARGE

## 2021-02-19 RX ORDER — WARFARIN SODIUM 2.5 MG/1
1 TABLET ORAL
Qty: 0 | Refills: 0 | DISCHARGE

## 2021-02-19 RX ORDER — SODIUM CHLORIDE 9 MG/ML
3 INJECTION INTRAMUSCULAR; INTRAVENOUS; SUBCUTANEOUS EVERY 8 HOURS
Refills: 0 | Status: DISCONTINUED | OUTPATIENT
Start: 2021-02-19 | End: 2021-03-05

## 2021-02-19 RX ORDER — ZOLPIDEM TARTRATE 10 MG/1
1 TABLET ORAL
Qty: 0 | Refills: 0 | DISCHARGE

## 2021-02-19 RX ADMIN — WARFARIN SODIUM 6 MILLIGRAM(S): 2.5 TABLET ORAL at 13:54

## 2021-02-19 RX ADMIN — SODIUM CHLORIDE 3 MILLILITER(S): 9 INJECTION INTRAMUSCULAR; INTRAVENOUS; SUBCUTANEOUS at 15:44

## 2021-02-19 NOTE — H&P CARDIOLOGY - PMH
Atrial fibrillation    CAD (coronary artery disease)    Hepatitis C    HLD (hyperlipidemia)    HTN (hypertension)  since CABG and mitral valve repair no longer on medications  MVP (mitral valve prolapse)

## 2021-02-19 NOTE — H&P CARDIOLOGY - STREET DRUG/MEDICATION/INHALANT, DURATION OF USE, PROFILE
Pt would like referral to Dr. Gomes @ [facility].  Appt 12/20/18.  He injured his knee and mom wants to make sure the plates are OK. Had to get in ASAP with specialist. I explained that this will not be address until 12/21.  Can we make it retro-active to cover his 12/20 visit?    Thanks!    Obdulia Schneider on 12/20/2018 at 2:15 PM     marijuana at night to sleep - last dose 2days ago; "mollies" pills - last dose 2months ath

## 2021-02-19 NOTE — H&P CARDIOLOGY - HISTORY OF PRESENT ILLNESS
60 year old male with known CAD s/p CABG 2, HTN, hyperlipidemia, MVP s/p mitral valve repair 11/2020, atrial fibrillation on coumadin , currently has a [employer] telemetry device in place but states it doesn't work now who presents for elective atrial fibrillation ablation.   Denies chest pain, SOB, dizziness, syncope or near syncope.     COVID PCR not detected 2/19/2021 60 year old male with known CAD s/p CABG x1 with mitral valve repair 11/2020, HTN, hyperlipidemia, atrial fibrillation on coumadin who presents for elective atrial fibrillation ablation.   Denies chest pain, SOB, dizziness, syncope or near syncope.     COVID PCR not detected 2/19/2021

## 2021-02-25 ENCOUNTER — LABORATORY RESULT (OUTPATIENT)
Age: 61
End: 2021-02-25

## 2021-03-08 ENCOUNTER — LABORATORY RESULT (OUTPATIENT)
Age: 61
End: 2021-03-08

## 2021-03-15 ENCOUNTER — LABORATORY RESULT (OUTPATIENT)
Age: 61
End: 2021-03-15

## 2021-03-25 ENCOUNTER — APPOINTMENT (OUTPATIENT)
Dept: ELECTROPHYSIOLOGY | Facility: CLINIC | Age: 61
End: 2021-03-25

## 2021-03-29 ENCOUNTER — LABORATORY RESULT (OUTPATIENT)
Age: 61
End: 2021-03-29

## 2021-04-06 ENCOUNTER — NON-APPOINTMENT (OUTPATIENT)
Age: 61
End: 2021-04-06

## 2021-04-08 ENCOUNTER — LABORATORY RESULT (OUTPATIENT)
Age: 61
End: 2021-04-08

## 2021-04-15 ENCOUNTER — LABORATORY RESULT (OUTPATIENT)
Age: 61
End: 2021-04-15

## 2021-04-22 ENCOUNTER — LABORATORY RESULT (OUTPATIENT)
Age: 61
End: 2021-04-22

## 2021-05-01 ENCOUNTER — LABORATORY RESULT (OUTPATIENT)
Age: 61
End: 2021-05-01

## 2021-05-06 ENCOUNTER — APPOINTMENT (OUTPATIENT)
Dept: CARDIOLOGY | Facility: CLINIC | Age: 61
End: 2021-05-06

## 2021-05-11 ENCOUNTER — APPOINTMENT (OUTPATIENT)
Dept: CARDIOLOGY | Facility: CLINIC | Age: 61
End: 2021-05-11
Payer: COMMERCIAL

## 2021-05-11 PROCEDURE — 99213 OFFICE O/P EST LOW 20 MIN: CPT | Mod: 95

## 2021-05-12 ENCOUNTER — LABORATORY RESULT (OUTPATIENT)
Age: 61
End: 2021-05-12

## 2021-05-20 ENCOUNTER — LABORATORY RESULT (OUTPATIENT)
Age: 61
End: 2021-05-20

## 2021-05-27 NOTE — PROGRESS NOTE ADULT - PROBLEM SELECTOR PLAN 4
Positive HCV serology with positive HCV PCR  Hepatology consulted - recs pending  Daily LFTs to trend EKG: nsr at 86 bpm, normal intervals, no acute ischemic changes

## 2021-05-28 ENCOUNTER — LABORATORY RESULT (OUTPATIENT)
Age: 61
End: 2021-05-28

## 2021-05-28 ENCOUNTER — NON-APPOINTMENT (OUTPATIENT)
Age: 61
End: 2021-05-28

## 2021-06-03 ENCOUNTER — LABORATORY RESULT (OUTPATIENT)
Age: 61
End: 2021-06-03

## 2021-06-14 ENCOUNTER — NON-APPOINTMENT (OUTPATIENT)
Age: 61
End: 2021-06-14

## 2021-06-17 ENCOUNTER — LABORATORY RESULT (OUTPATIENT)
Age: 61
End: 2021-06-17

## 2021-06-24 ENCOUNTER — LABORATORY RESULT (OUTPATIENT)
Age: 61
End: 2021-06-24

## 2021-06-26 ENCOUNTER — LABORATORY RESULT (OUTPATIENT)
Age: 61
End: 2021-06-26

## 2021-07-01 ENCOUNTER — LABORATORY RESULT (OUTPATIENT)
Age: 61
End: 2021-07-01

## 2021-07-08 ENCOUNTER — LABORATORY RESULT (OUTPATIENT)
Age: 61
End: 2021-07-08

## 2021-07-15 ENCOUNTER — LABORATORY RESULT (OUTPATIENT)
Age: 61
End: 2021-07-15

## 2021-07-22 ENCOUNTER — LABORATORY RESULT (OUTPATIENT)
Age: 61
End: 2021-07-22

## 2021-07-29 ENCOUNTER — LABORATORY RESULT (OUTPATIENT)
Age: 61
End: 2021-07-29

## 2021-07-30 ENCOUNTER — NON-APPOINTMENT (OUTPATIENT)
Age: 61
End: 2021-07-30

## 2021-08-03 ENCOUNTER — LABORATORY RESULT (OUTPATIENT)
Age: 61
End: 2021-08-03

## 2021-08-04 LAB
INR PPP: 1.33
PT BLD: 15.5

## 2021-08-12 ENCOUNTER — APPOINTMENT (OUTPATIENT)
Dept: CARDIOLOGY | Facility: CLINIC | Age: 61
End: 2021-08-12

## 2021-08-18 ENCOUNTER — NON-APPOINTMENT (OUTPATIENT)
Age: 61
End: 2021-08-18

## 2021-08-19 ENCOUNTER — NON-APPOINTMENT (OUTPATIENT)
Age: 61
End: 2021-08-19

## 2021-08-24 LAB
INR PPP: 1.37 RATIO
PT BLD: 15.9 SEC

## 2021-08-31 LAB
INR PPP: 1.45 RATIO
PT BLD: 16.8 SEC

## 2021-09-09 ENCOUNTER — OUTPATIENT (OUTPATIENT)
Dept: OUTPATIENT SERVICES | Facility: HOSPITAL | Age: 61
LOS: 1 days | Discharge: HOME | End: 2021-09-09

## 2021-09-09 DIAGNOSIS — Z95.1 PRESENCE OF AORTOCORONARY BYPASS GRAFT: Chronic | ICD-10-CM

## 2021-09-09 DIAGNOSIS — Z98.890 OTHER SPECIFIED POSTPROCEDURAL STATES: Chronic | ICD-10-CM

## 2021-09-10 DIAGNOSIS — F10.20 ALCOHOL DEPENDENCE, UNCOMPLICATED: ICD-10-CM

## 2021-09-10 DIAGNOSIS — F17.200 NICOTINE DEPENDENCE, UNSPECIFIED, UNCOMPLICATED: ICD-10-CM

## 2021-09-10 DIAGNOSIS — F12.20 CANNABIS DEPENDENCE, UNCOMPLICATED: ICD-10-CM

## 2021-09-10 LAB
INR PPP: 2.13 RATIO
PT BLD: 24.5 SEC

## 2021-09-14 LAB
INR PPP: 1.77 RATIO
PT BLD: 20.3 SEC

## 2021-09-22 ENCOUNTER — LABORATORY RESULT (OUTPATIENT)
Age: 61
End: 2021-09-22

## 2021-09-23 ENCOUNTER — NON-APPOINTMENT (OUTPATIENT)
Age: 61
End: 2021-09-23

## 2021-09-23 ENCOUNTER — APPOINTMENT (OUTPATIENT)
Dept: CARDIOLOGY | Facility: CLINIC | Age: 61
End: 2021-09-23
Payer: COMMERCIAL

## 2021-09-23 VITALS
HEIGHT: 69 IN | OXYGEN SATURATION: 99 % | SYSTOLIC BLOOD PRESSURE: 175 MMHG | WEIGHT: 165 LBS | HEART RATE: 77 BPM | BODY MASS INDEX: 24.44 KG/M2 | DIASTOLIC BLOOD PRESSURE: 121 MMHG

## 2021-09-23 DIAGNOSIS — Z51.81 ENCOUNTER FOR THERAPEUTIC DRUG LVL MONITORING: ICD-10-CM

## 2021-09-23 DIAGNOSIS — I34.0 NONRHEUMATIC MITRAL (VALVE) INSUFFICIENCY: ICD-10-CM

## 2021-09-23 DIAGNOSIS — E78.5 HYPERLIPIDEMIA, UNSPECIFIED: ICD-10-CM

## 2021-09-23 DIAGNOSIS — Z79.01 ENCOUNTER FOR THERAPEUTIC DRUG LVL MONITORING: ICD-10-CM

## 2021-09-23 LAB
INR PPP: 3.35
PT BLD: 37.6

## 2021-09-23 PROCEDURE — 93000 ELECTROCARDIOGRAM COMPLETE: CPT

## 2021-09-23 PROCEDURE — 99214 OFFICE O/P EST MOD 30 MIN: CPT

## 2021-09-28 ENCOUNTER — APPOINTMENT (OUTPATIENT)
Dept: CV DIAGNOSITCS | Facility: HOSPITAL | Age: 61
End: 2021-09-28

## 2021-09-28 ENCOUNTER — OUTPATIENT (OUTPATIENT)
Dept: OUTPATIENT SERVICES | Facility: HOSPITAL | Age: 61
LOS: 1 days | End: 2021-09-28
Payer: COMMERCIAL

## 2021-09-28 DIAGNOSIS — I48.91 UNSPECIFIED ATRIAL FIBRILLATION: ICD-10-CM

## 2021-09-28 DIAGNOSIS — Z98.890 OTHER SPECIFIED POSTPROCEDURAL STATES: Chronic | ICD-10-CM

## 2021-09-28 DIAGNOSIS — Z95.1 PRESENCE OF AORTOCORONARY BYPASS GRAFT: Chronic | ICD-10-CM

## 2021-09-28 PROCEDURE — 93306 TTE W/DOPPLER COMPLETE: CPT | Mod: 26

## 2021-09-30 ENCOUNTER — LABORATORY RESULT (OUTPATIENT)
Age: 61
End: 2021-09-30

## 2021-10-12 LAB
INR PPP: 1.96 RATIO
PT BLD: 22.4 SEC

## 2021-10-17 DIAGNOSIS — Z01.818 ENCOUNTER FOR OTHER PREPROCEDURAL EXAMINATION: ICD-10-CM

## 2021-10-17 PROBLEM — Z51.81 ANTICOAGULATION GOAL OF INR 2 TO 3: Status: ACTIVE | Noted: 2020-12-03

## 2021-10-17 PROBLEM — E78.5 HLD (HYPERLIPIDEMIA): Status: ACTIVE | Noted: 2020-12-10

## 2021-10-17 PROBLEM — I34.0 MITRAL VALVE INSUFFICIENCY: Status: ACTIVE | Noted: 2020-12-03

## 2021-10-22 ENCOUNTER — APPOINTMENT (OUTPATIENT)
Dept: DISASTER EMERGENCY | Facility: CLINIC | Age: 61
End: 2021-10-22

## 2021-10-24 LAB — SARS-COV-2 N GENE NPH QL NAA+PROBE: NOT DETECTED

## 2021-10-25 ENCOUNTER — OUTPATIENT (OUTPATIENT)
Dept: OUTPATIENT SERVICES | Facility: HOSPITAL | Age: 61
LOS: 1 days | End: 2021-10-25
Payer: COMMERCIAL

## 2021-10-25 DIAGNOSIS — Z95.1 PRESENCE OF AORTOCORONARY BYPASS GRAFT: Chronic | ICD-10-CM

## 2021-10-25 DIAGNOSIS — I05.9 RHEUMATIC MITRAL VALVE DISEASE, UNSPECIFIED: ICD-10-CM

## 2021-10-25 DIAGNOSIS — I34.0 NONRHEUMATIC MITRAL (VALVE) INSUFFICIENCY: ICD-10-CM

## 2021-10-25 DIAGNOSIS — Z98.890 OTHER SPECIFIED POSTPROCEDURAL STATES: ICD-10-CM

## 2021-10-25 DIAGNOSIS — Z98.890 OTHER SPECIFIED POSTPROCEDURAL STATES: Chronic | ICD-10-CM

## 2021-10-25 PROCEDURE — 93312 ECHO TRANSESOPHAGEAL: CPT | Mod: 26

## 2021-10-25 PROCEDURE — 76376 3D RENDER W/INTRP POSTPROCES: CPT | Mod: 26

## 2021-10-25 PROCEDURE — 93306 TTE W/DOPPLER COMPLETE: CPT | Mod: 26

## 2021-10-25 RX ORDER — SODIUM CHLORIDE 9 MG/ML
3 INJECTION INTRAMUSCULAR; INTRAVENOUS; SUBCUTANEOUS EVERY 8 HOURS
Refills: 0 | Status: DISCONTINUED | OUTPATIENT
Start: 2021-10-25 | End: 2021-11-08

## 2021-11-02 ENCOUNTER — APPOINTMENT (OUTPATIENT)
Dept: CARDIOLOGY | Facility: CLINIC | Age: 61
End: 2021-11-02
Payer: COMMERCIAL

## 2021-11-02 DIAGNOSIS — I49.8 OTHER SPECIFIED CARDIAC ARRHYTHMIAS: ICD-10-CM

## 2021-11-02 DIAGNOSIS — I34.1 NONRHEUMATIC MITRAL (VALVE) PROLAPSE: ICD-10-CM

## 2021-11-02 DIAGNOSIS — Z79.01 LONG TERM (CURRENT) USE OF ANTICOAGULANTS: ICD-10-CM

## 2021-11-02 DIAGNOSIS — I34.0 NONRHEUMATIC MITRAL (VALVE) INSUFFICIENCY: ICD-10-CM

## 2021-11-02 DIAGNOSIS — I05.9 RHEUMATIC MITRAL VALVE DISEASE, UNSPECIFIED: ICD-10-CM

## 2021-11-02 DIAGNOSIS — Z95.1 PRESENCE OF AORTOCORONARY BYPASS GRAFT: ICD-10-CM

## 2021-11-02 DIAGNOSIS — I44.2 ATRIOVENTRICULAR BLOCK, COMPLETE: ICD-10-CM

## 2021-11-02 DIAGNOSIS — I48.19 OTHER PERSISTENT ATRIAL FIBRILLATION: ICD-10-CM

## 2021-11-02 DIAGNOSIS — I25.10 ATHEROSCLEROTIC HEART DISEASE OF NATIVE CORONARY ARTERY W/OUT ANGINA PECTORIS: ICD-10-CM

## 2021-11-02 DIAGNOSIS — Z98.890 OTHER SPECIFIED POSTPROCEDURAL STATES: ICD-10-CM

## 2021-11-02 DIAGNOSIS — I10 ESSENTIAL (PRIMARY) HYPERTENSION: ICD-10-CM

## 2021-11-02 PROCEDURE — 99214 OFFICE O/P EST MOD 30 MIN: CPT | Mod: 95

## 2021-11-05 PROBLEM — Z98.890 S/P MITRAL VALVE REPAIR: Status: ACTIVE | Noted: 2020-12-01

## 2021-11-05 PROBLEM — I05.9 MITRAL VALVE DISORDER: Status: ACTIVE | Noted: 2021-10-05

## 2021-11-05 PROBLEM — I34.0 SEVERE MITRAL REGURGITATION: Status: ACTIVE | Noted: 2021-10-05

## 2021-11-05 PROBLEM — I25.10 CAD (CORONARY ARTERY DISEASE), NATIVE CORONARY ARTERY: Status: ACTIVE | Noted: 2020-12-10

## 2021-11-05 PROBLEM — I49.8 JUNCTIONAL CARDIAC ARRHYTHMIA: Status: ACTIVE | Noted: 2020-12-03

## 2021-11-05 PROBLEM — I10 HYPERTENSION: Status: ACTIVE | Noted: 2020-12-03

## 2021-11-05 PROBLEM — Z95.1 S/P CABG X 1: Status: ACTIVE | Noted: 2020-12-01

## 2021-11-05 PROBLEM — I44.2 COMPLETE HEART BLOCK: Status: ACTIVE | Noted: 2020-12-03

## 2021-11-05 PROBLEM — Z79.01 CURRENT USE OF LONG TERM ANTICOAGULATION: Status: ACTIVE | Noted: 2020-12-03

## 2021-11-05 PROBLEM — I34.1 MVP (MITRAL VALVE PROLAPSE): Status: ACTIVE | Noted: 2020-12-03

## 2021-11-05 PROBLEM — I48.19 ATRIAL FIBRILLATION, PERSISTENT: Status: ACTIVE | Noted: 2020-12-10

## 2021-11-08 PROBLEM — I48.91 ATRIAL FIBRILLATION: Status: ACTIVE | Noted: 2020-12-03

## 2021-11-09 ENCOUNTER — APPOINTMENT (OUTPATIENT)
Dept: CARDIOTHORACIC SURGERY | Facility: CLINIC | Age: 61
End: 2021-11-09
Payer: COMMERCIAL

## 2021-11-09 VITALS
SYSTOLIC BLOOD PRESSURE: 172 MMHG | HEART RATE: 77 BPM | DIASTOLIC BLOOD PRESSURE: 100 MMHG | RESPIRATION RATE: 15 BRPM | TEMPERATURE: 98 F | OXYGEN SATURATION: 98 %

## 2021-11-09 VITALS — HEIGHT: 69 IN | BODY MASS INDEX: 24.44 KG/M2 | WEIGHT: 165 LBS

## 2021-11-09 DIAGNOSIS — I48.91 UNSPECIFIED ATRIAL FIBRILLATION: ICD-10-CM

## 2021-11-09 PROCEDURE — 99215 OFFICE O/P EST HI 40 MIN: CPT

## 2021-11-09 RX ORDER — WARFARIN 6 MG/1
6 TABLET ORAL
Qty: 90 | Refills: 3 | Status: COMPLETED | COMMUNITY
End: 2021-11-09

## 2021-11-09 NOTE — DATA REVIEWED
[FreeTextEntry1] : PATEL on 10/25/2021\par by Anthony Montejo\par CONCLUSIONS:\par 1. An annuloplasty ring is seen in the mitral position.\par Three dimensional imaging demonstrates residual mitral\par valve prolapse involving the middle (P2) scallop of the\par posterior mitral leaflet. Severe mitral regurgitation with\par an eccentric, anteriorly directed jet.  Note that the\par mitral regurgitation was significantly more prominent on\par the transthoracic pre-images than on the PATEL images\par (particularly images 57, 59, 60, and 63 of the\par transthoracic pre-scan).\par 2. Calcified trileaflet aortic valve with normal opening.\par Mild aortic regurgitation.\par 3. Normal aortic root.  Mild non-mobile atherosclerotic\par plaque in the aortic arch and descending thoracic aorta.\par 4. Mildly dilated left atrium.  LA volume index = 35 cc/m2.\par  The left atrial appendage (WILBER) has been previously\par ligated.  No residual LA-WILBER communication seen with colour\par Doppler interrogation.  No left atrial thrombus seen.\par 5. Normal left ventricular internal dimensions and wall\par thicknesses.\par 6. Low normal left ventricular systolic function. No\par segmental wall motion abnormalities.\par 7. Normal right ventricular size and function.\par 8. Contrast injection demonstrates no evidence of a patent\par foramen ovale.\par \par

## 2021-11-09 NOTE — HISTORY OF PRESENT ILLNESS
[FreeTextEntry1] : Felice is a 61 year old male with PMH of HTN, MVP and CAD who underwent a CABG x 2 and MV repair on 11/21/2020. His post op course was complicated by atrial fibrillation. He was seen by Dr. Bush for cardiology clearance for upcoming right should arthroscopic surgical repair. A echocardiogram demonstrated severe MR in the setting of prior MV repair.  PATEL on 10/28/2021 demonstrated residual MVP involving P2 of posterior leaflet.  he was referred for surgical consultation. He feels well today, denies angina, palpitations, SOB, MEDELLIN or syncope.

## 2021-11-09 NOTE — CONSULT LETTER
[Dear  ___] : Dear ~MAGDALENA, [Courtesy Letter:] : I had the pleasure of seeing your patient, [unfilled], in my office today. [Please see my note below.] : Please see my note below. [Consult Closing:] : Thank you very much for allowing me to participate in the care of this patient.  If you have any questions, please do not hesitate to contact me. [Sincerely,] : Sincerely, [FreeTextEntry2] : Dr. Gianluca Bush [FreeTextEntry3] : Darrell Sarmiento MD\par \par Cardiovascular & Thoracic Surgery\par Professor\par [facility]\par \par

## 2021-11-09 NOTE — PHYSICAL EXAM
[Sclera] : the sclera and conjunctiva were normal [PERRL With Normal Accommodation] : pupils were equal in size, round, and reactive to light [Extraocular Movements] : extraocular movements were intact [Neck Appearance] : the appearance of the neck was normal [Neck Cervical Mass (___cm)] : no neck mass was observed [Jugular Venous Distention Increased] : there was no jugular-venous distention [Thyroid Diffuse Enlargement] : the thyroid was not enlarged [Thyroid Nodule] : there were no palpable thyroid nodules [] : no respiratory distress [Auscultation Breath Sounds / Voice Sounds] : lungs were clear to auscultation bilaterally [Surgical / Traumatic Scar Sternum] : a scar [Breast Appearance] : normal in appearance [Bowel Sounds] : normal bowel sounds [Abdomen Soft] : soft [Cervical Lymph Nodes Enlarged Posterior Bilaterally] : posterior cervical [Cervical Lymph Nodes Enlarged Anterior Bilaterally] : anterior cervical [No CVA Tenderness] : no ~M costovertebral angle tenderness [Involuntary Movements] : no involuntary movements were seen [Skin Color & Pigmentation] : normal skin color and pigmentation [No Focal Deficits] : no focal deficits [Oriented To Time, Place, And Person] : oriented to person, place, and time [Impaired Insight] : insight and judgment were intact

## 2021-11-09 NOTE — REVIEW OF SYSTEMS
[Feeling Tired] : feeling tired [SOB on Exertion] : shortness of breath during exertion [Negative] : Heme/Lymph [Chest Pain] : no chest pain [Palpitations] : no palpitations [Dizziness] : no dizziness [Fainting] : no fainting

## 2021-11-09 NOTE — ASSESSMENT
[FreeTextEntry1] : Felice is a 61 year old male with PMH of HTN, MVP and CAD who underwent a CABG x 2 and MV repair on 11/21/2020. His post op course was complicated by atrial fibrillation. He was seen by Dr. Bush for cardiology clearance for upcoming right should arthroscopic surgical repair. A echocardiogram demonstrated severe MR in the setting of prior MV repair.  PATEL on 10/28/2021 demonstrated residual MVP involving P2 of posterior leaflet.  he was referred for surgical consultation. \par \par The patient has recurrent severe MR but remains asymptomatic.  In addition, he has poorly controlled hypertension (171/123 today) and 160/105 during PATEL.  There is a possibility that tighter BP control will diminish the amount of MR and the patient may avoid reoperative surgery.  With this in mind we are starting Lisinopril 20 mg OD and HCTZ 25 mg OD and have asked that he begin to record his BP BID at home.  I will plan to see him in two weeks and if the BP is better controlled will repeat the TTE at that time.

## 2021-11-09 NOTE — REASON FOR VISIT
[Follow-Up: _____] : a [unfilled] follow-up visit [FreeTextEntry1] : severe MR of previously repaired mitral valve

## 2021-11-23 ENCOUNTER — APPOINTMENT (OUTPATIENT)
Dept: CARDIOTHORACIC SURGERY | Facility: CLINIC | Age: 61
End: 2021-11-23

## 2021-11-23 ENCOUNTER — NON-APPOINTMENT (OUTPATIENT)
Age: 61
End: 2021-11-23

## 2021-11-29 NOTE — DATA REVIEWED
[FreeTextEntry1] : PATEL on 10/25/2021\par by Anthony Montejo\par 1. An annuloplasty ring is seen in the mitral position.\par Three dimensional imaging demonstrates residual mitral\par valve prolapse involving the middle (P2) scallop of the\par posterior mitral leaflet. Severe mitral regurgitation with\par an eccentric, anteriorly directed jet.  Note that the\par mitral regurgitation was significantly more prominent on\par the transthoracic pre-images than on the PATEL images\par (particularly images 57, 59, 60, and 63 of the\par transthoracic pre-scan).\par 2. Calcified trileaflet aortic valve with normal opening.\par Mild aortic regurgitation.\par 3. Normal aortic root.  Mild non-mobile atherosclerotic\par plaque in the aortic arch and descending thoracic aorta.\par 4. Mildly dilated left atrium.  LA volume index = 35 cc/m2.\par  The left atrial appendage (WILBER) has been previously\par ligated.  No residual LA-WILBER communication seen with colour\par Doppler interrogation.  No left atrial thrombus seen.\par 5. Normal left ventricular internal dimensions and wall\par thicknesses.\par 6. Low normal left ventricular systolic function. No\par segmental wall motion abnormalities.\par 7. Normal right ventricular size and function.\par 8. Contrast injection demonstrates no evidence of a patent\par foramen ovale.\par \par

## 2021-11-29 NOTE — ASSESSMENT
[FreeTextEntry1] : Felice is a 61 year old male with PMH of HTN, MVP and CAD who underwent a CABG x 2 and MV repair on 11/21/2020. His post op course was complicated by atrial fibrillation. He was seen by Dr. Bush for cardiology clearance for upcoming right should arthroscopic surgical repair. A echocardiogram demonstrated severe MR in the setting of prior MV repair. PATEL on 10/28/2021 demonstrated residual MVP involving P2 of posterior leaflet. he was referred for surgical consultation. He was last seen on 11/9/2021 for office visit. He was hypertensive in the office and medical management was recommended. He returns today for clinical recheck.  He was instructed to check his Bp at home and bring those readings to his office visit today. \par \par

## 2021-11-29 NOTE — CONSULT LETTER
[FreeTextEntry2] : Gianluca Bush MD \par [address]\[place] [address] [FreeTextEntry3] : Darrell Sarmiento MD\par \par Cardiovascular & Thoracic Surgery\par Professor\par [facility]\par \par

## 2021-11-29 NOTE — HISTORY OF PRESENT ILLNESS
[FreeTextEntry1] : Felice is a 61 year old male with PMH of HTN, MVP and CAD who underwent a CABG x 2 and MV repair on 11/21/2020. His post op course was complicated by atrial fibrillation. He was seen by Dr. Bush for cardiology clearance for upcoming right should arthroscopic surgical repair. A echocardiogram demonstrated severe MR in the setting of prior MV repair. PATEL on 10/28/2021 demonstrated residual MVP involving P2 of posterior leaflet. he was referred for surgical consultation. He was last seen on 11/9/2021 for office visit. He was hypertensive in the office and medical management was recommended. He returns today for clinical recheck.  He was instructed to check his Bp at home and bring those readings to his office visit today. \par

## 2021-11-30 ENCOUNTER — APPOINTMENT (OUTPATIENT)
Dept: CARDIOTHORACIC SURGERY | Facility: CLINIC | Age: 61
End: 2021-11-30

## 2021-11-30 ENCOUNTER — NON-APPOINTMENT (OUTPATIENT)
Age: 61
End: 2021-11-30

## 2022-02-07 ENCOUNTER — NON-APPOINTMENT (OUTPATIENT)
Age: 62
End: 2022-02-07

## 2022-02-09 ENCOUNTER — OUTPATIENT (OUTPATIENT)
Dept: OUTPATIENT SERVICES | Facility: HOSPITAL | Age: 62
LOS: 1 days | End: 2022-02-09
Payer: COMMERCIAL

## 2022-02-09 VITALS
RESPIRATION RATE: 18 BRPM | DIASTOLIC BLOOD PRESSURE: 62 MMHG | HEIGHT: 70 IN | TEMPERATURE: 98 F | HEART RATE: 68 BPM | SYSTOLIC BLOOD PRESSURE: 194 MMHG | WEIGHT: 164.91 LBS | OXYGEN SATURATION: 100 %

## 2022-02-09 VITALS
OXYGEN SATURATION: 97 % | RESPIRATION RATE: 14 BRPM | HEART RATE: 70 BPM | DIASTOLIC BLOOD PRESSURE: 70 MMHG | SYSTOLIC BLOOD PRESSURE: 114 MMHG

## 2022-02-09 DIAGNOSIS — R94.39 ABNORMAL RESULT OF OTHER CARDIOVASCULAR FUNCTION STUDY: ICD-10-CM

## 2022-02-09 DIAGNOSIS — Z98.890 OTHER SPECIFIED POSTPROCEDURAL STATES: Chronic | ICD-10-CM

## 2022-02-09 DIAGNOSIS — Z95.1 PRESENCE OF AORTOCORONARY BYPASS GRAFT: Chronic | ICD-10-CM

## 2022-02-09 LAB
ANION GAP SERPL CALC-SCNC: 11 MMOL/L — SIGNIFICANT CHANGE UP (ref 5–17)
BUN SERPL-MCNC: 20 MG/DL — SIGNIFICANT CHANGE UP (ref 7–23)
CALCIUM SERPL-MCNC: 9.5 MG/DL — SIGNIFICANT CHANGE UP (ref 8.4–10.5)
CHLORIDE SERPL-SCNC: 100 MMOL/L — SIGNIFICANT CHANGE UP (ref 96–108)
CO2 SERPL-SCNC: 24 MMOL/L — SIGNIFICANT CHANGE UP (ref 22–31)
CREAT SERPL-MCNC: 1.3 MG/DL — SIGNIFICANT CHANGE UP (ref 0.5–1.3)
GLUCOSE SERPL-MCNC: 76 MG/DL — SIGNIFICANT CHANGE UP (ref 70–99)
HCT VFR BLD CALC: 42.6 % — SIGNIFICANT CHANGE UP (ref 39–50)
HGB BLD-MCNC: 14.2 G/DL — SIGNIFICANT CHANGE UP (ref 13–17)
MCHC RBC-ENTMCNC: 29.5 PG — SIGNIFICANT CHANGE UP (ref 27–34)
MCHC RBC-ENTMCNC: 33.3 GM/DL — SIGNIFICANT CHANGE UP (ref 32–36)
MCV RBC AUTO: 88.4 FL — SIGNIFICANT CHANGE UP (ref 80–100)
NRBC # BLD: 0 /100 WBCS — SIGNIFICANT CHANGE UP (ref 0–0)
PLATELET # BLD AUTO: 182 K/UL — SIGNIFICANT CHANGE UP (ref 150–400)
POTASSIUM SERPL-MCNC: 4.7 MMOL/L — SIGNIFICANT CHANGE UP (ref 3.5–5.3)
POTASSIUM SERPL-SCNC: 4.7 MMOL/L — SIGNIFICANT CHANGE UP (ref 3.5–5.3)
RBC # BLD: 4.82 M/UL — SIGNIFICANT CHANGE UP (ref 4.2–5.8)
RBC # FLD: 14.2 % — SIGNIFICANT CHANGE UP (ref 10.3–14.5)
SODIUM SERPL-SCNC: 135 MMOL/L — SIGNIFICANT CHANGE UP (ref 135–145)
WBC # BLD: 9.99 K/UL — SIGNIFICANT CHANGE UP (ref 3.8–10.5)
WBC # FLD AUTO: 9.99 K/UL — SIGNIFICANT CHANGE UP (ref 3.8–10.5)

## 2022-02-09 PROCEDURE — C1769: CPT

## 2022-02-09 PROCEDURE — 93459 L HRT ART/GRFT ANGIO: CPT

## 2022-02-09 PROCEDURE — 93459 L HRT ART/GRFT ANGIO: CPT | Mod: 26

## 2022-02-09 PROCEDURE — 85027 COMPLETE CBC AUTOMATED: CPT

## 2022-02-09 PROCEDURE — 36415 COLL VENOUS BLD VENIPUNCTURE: CPT

## 2022-02-09 PROCEDURE — 99152 MOD SED SAME PHYS/QHP 5/>YRS: CPT

## 2022-02-09 PROCEDURE — 93010 ELECTROCARDIOGRAM REPORT: CPT

## 2022-02-09 PROCEDURE — 93005 ELECTROCARDIOGRAM TRACING: CPT

## 2022-02-09 PROCEDURE — 80048 BASIC METABOLIC PNL TOTAL CA: CPT

## 2022-02-09 PROCEDURE — C1887: CPT

## 2022-02-09 PROCEDURE — C1894: CPT

## 2022-02-09 RX ORDER — RIVAROXABAN 15 MG-20MG
1 KIT ORAL
Qty: 0 | Refills: 0 | DISCHARGE

## 2022-02-09 RX ORDER — ASPIRIN/CALCIUM CARB/MAGNESIUM 324 MG
1 TABLET ORAL
Qty: 0 | Refills: 0 | DISCHARGE

## 2022-02-09 RX ORDER — LISINOPRIL/HYDROCHLOROTHIAZIDE 10-12.5 MG
1 TABLET ORAL
Qty: 0 | Refills: 0 | DISCHARGE

## 2022-02-09 RX ORDER — ATORVASTATIN CALCIUM 80 MG/1
1 TABLET, FILM COATED ORAL
Qty: 0 | Refills: 0 | DISCHARGE

## 2022-02-09 RX ORDER — WARFARIN SODIUM 2.5 MG/1
1 TABLET ORAL
Qty: 0 | Refills: 0 | DISCHARGE

## 2022-02-09 RX ORDER — ZOLPIDEM TARTRATE 10 MG/1
1 TABLET ORAL
Qty: 0 | Refills: 0 | DISCHARGE

## 2022-02-09 RX ORDER — SODIUM CHLORIDE 9 MG/ML
3 INJECTION INTRAMUSCULAR; INTRAVENOUS; SUBCUTANEOUS EVERY 8 HOURS
Refills: 0 | Status: DISCONTINUED | OUTPATIENT
Start: 2022-02-09 | End: 2022-02-24

## 2022-02-09 NOTE — ASU DISCHARGE PLAN (ADULT/PEDIATRIC) - PROVIDER TOKENS
PROVIDER:[TOKEN:[[id number]],FOLLOWUP:[2 weeks],ESTABLISHEDPATIENT:[T]],PROVIDER:[TOKEN:[[id number]],FOLLOWUP:[2 weeks],ESTABLISHEDPATIENT:[T]]

## 2022-02-09 NOTE — H&P CARDIOLOGY - STREET DRUG/MEDICATION/INHALANT, DURATION OF USE, PROFILE
marijuana every day smoked this am  and "mollies" pills (speed ball) - last dose 3 days ago in place of smoking

## 2022-02-09 NOTE — H&P CARDIOLOGY - NSICDXFAMILYHX_GEN_ALL_CORE_FT
FAMILY HISTORY:  Father  Still living? Unknown  FHx: heart disease, Age at diagnosis: Age Unknown    Sibling  Still living? Unknown  FH: type 2 diabetes, Age at diagnosis: Age Unknown
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
## 2022-02-09 NOTE — ASU DISCHARGE PLAN (ADULT/PEDIATRIC) - CARE PROVIDER_API CALL
Gianluca Bush; PhD)  Cardiology; Internal Medicine; Vascular Medicine  [phone] [place] [address], [address]  [place] [address]  Phone: [phone]  Fax: [phone]  Established Patient  Follow Up Time: 2 weeks    Nawaf Ruff)  Cardiology Medicine  [address] [facility], [address]  [place] [address]  Phone: [phone]  Fax: [phone]  Established Patient  Follow Up Time: 2 weeks

## 2022-03-25 ENCOUNTER — RX RENEWAL (OUTPATIENT)
Age: 62
End: 2022-03-25

## 2022-04-13 NOTE — ED ADULT NURSE NOTE - CAS TRG GEN SKIN CONDITION
Pt has been advised to not wear any jewelry, makeup, no lotions, powders, creams, Deodorant or nail polish dos.      Patient aware of date, time and place of Covid test, and informed that once they get their COVID test :   • Do NOT travel out of home area   • Self -quarantine is recommended to minimizes your risk of exposure before your procedure, if you are unable to self-quarantine, please continue to wear a mask, practice social distancing and perform good hand hygiene.  • Immediately report any new symptoms or suspected/known exposures to COVID-19 cases  to your surgeon’s office  o fever of 100.4 or more  o new cough, or cough that gets worse  o difficulty breathing  o sore throat  o stomach problems: nausea, vomiting or diarrhea  o loss of taste or smell  o chills and/or repeated shaking with chills  o muscle pain  o headache  • Maintain physical distancing (at least 6 feet) at all times both at home and away from home  • Wash hands frequently and thoroughly with soap and water (lather at least 20 seconds) or disinfect with alcohol-based hand  before eating.  This should also be done by the person who will be bringing you to and from the procedure.  • Only one visitor allowed into building. They must remain in designated area assigned by the nursing staff.  The visitor may have a drink if it is covered with a lid or cap.  Please do not drink while care team members are in the room.  • It will be an expectation for the patient/support person to wear a mask at all times during their stay.  Patient is aware they will not get notified of a negative result       Warm/Dry

## 2022-04-22 ENCOUNTER — RX RENEWAL (OUTPATIENT)
Age: 62
End: 2022-04-22

## 2022-08-23 ENCOUNTER — RX RENEWAL (OUTPATIENT)
Age: 62
End: 2022-08-23

## 2022-09-28 RX ORDER — RIVAROXABAN 20 MG/1
20 TABLET, FILM COATED ORAL
Qty: 30 | Refills: 5 | Status: ACTIVE | COMMUNITY
Start: 2021-10-12 | End: 1900-01-01

## 2022-09-28 RX ORDER — ATORVASTATIN CALCIUM 40 MG/1
40 TABLET, FILM COATED ORAL
Qty: 90 | Refills: 0 | Status: DISCONTINUED | COMMUNITY
Start: 2022-08-23 | End: 2022-09-28

## 2022-09-28 RX ORDER — RIVAROXABAN 20 MG/1
20 TABLET, FILM COATED ORAL
Qty: 90 | Refills: 3 | Status: DISCONTINUED | COMMUNITY
Start: 2021-10-05 | End: 2022-09-28

## 2022-11-19 RX ORDER — LISINOPRIL AND HYDROCHLOROTHIAZIDE TABLETS 20; 25 MG/1; MG/1
20-25 TABLET ORAL DAILY
Qty: 90 | Refills: 0 | Status: ACTIVE | COMMUNITY
Start: 2021-11-09 | End: 1900-01-01

## 2022-11-19 RX ORDER — ATORVASTATIN CALCIUM 40 MG/1
40 TABLET, FILM COATED ORAL DAILY
Qty: 30 | Refills: 5 | Status: ACTIVE | COMMUNITY
Start: 2022-03-25 | End: 1900-01-01

## 2023-01-03 NOTE — ASU DISCHARGE PLAN (ADULT/PEDIATRIC) - NS MD DC FALL RISK RISK
No
For information on Fall & Injury Prevention, visit: [email or web address] OR  [email or web address] OR  https://www.cdc.gov/steadi/patient.html

## 2023-04-17 NOTE — PROGRESS NOTE ADULT - I HAVE PERSONALLY SEEN, EXAMINED, AND PARTICIPATED IN THE CARE OF THIS PATIENT.  I HAVE REVIEWED ALL PERTINENT CLINICAL INFORMATION, INCLUDING HISTORY, PHYSICAL EXAM, PLAN AND THE MEDICAL/PA/NP STUDENT’S NOTE AND AGREE EXCEPT AS NOTED.
Universal Protocol/PAUSE Rule completed. MILAN Fajardo Statement Selected

## 2023-04-24 NOTE — DISCHARGE NOTE PROVIDER - NSDCHC_MEDRECSTATUS_GEN_ALL_CORE
Patient stated that she was discharged from the hospital in March and they increased her metoprolol to two times daily. Patient has seen Dr. Torres on 4/18/23 and Dr. Torres had made some medication adjustments while she was here on her AVS that she was discharged with from the hospital. Patient states that he did not cross the metoprolol off and correct the dose. Patient picked up her prescription on 4/20 and was given 30 tabs. Patient has been taking metoprolol twice daily and has 20 tabs left, patient was just needing clarification if she needed to continue taking BID or if she needed to decrease and go back down to one tab daily.     Please advise.    Admission Reconciliation is Completed  Discharge Reconciliation is Not Complete Admission Reconciliation is Completed  Discharge Reconciliation is Completed

## 2023-05-18 NOTE — ED PROVIDER NOTE - ST/T WAVE
[Takes medication as prescribed] : takes [None] : Patient does not have any barriers to medication adherence No ST/T wave abnormalities

## 2023-07-03 NOTE — PROGRESS NOTE ADULT - PROBLEM SELECTOR PROBLEM 4
Please notify the patient of normal results.  Follow-up as planned.
Atrial fibrillation
Atrial fibrillation
Hepatitis C

## 2023-10-10 NOTE — PROGRESS NOTE ADULT - SUBJECTIVE AND OBJECTIVE BOX
"Subjective     Renita Merino is a 14 m.o. female who presents with Fever (102 to 103.5 fever. Went to [facility] emergency last night.) and Vomiting       History provided by mother.    HPI    Renita is 14 mo F with history of recurrent ear infections status post tympanostomy tube placement who presents for fever and vomiting.      Yesterday, she developed fever to 102.2.  She even spiked as high as 104.5.  She would develop episodes of nonbloody nonbilious emesis when she had fever.  Given the higher fever, mother brought her to [facility] emergency room.  COVID, RSV, and flu testing were performed and negative.  Chest x-ray was performed and reportedly normal.  Urinalysis was performed and also normal.  She was given a suppository.  They sent a prescription for Zofran.  Mother reports that she is keeping it down better than he was yesterday despite not taking Zofran.    She has had 4 wet diapers just today.    ROS     As per HPI      Objective     Pulse 135   Temp 36.2 °C (97.1 °F) (Temporal)   Resp 32   Ht 0.813 m (2' 8\")   Wt 11.5 kg (25 lb 6.4 oz)   SpO2 99%   BMI 17.44 kg/m²      Physical Exam  Constitutional:       General: She is active. She is not in acute distress.  HENT:      Right Ear: Ear canal and external ear normal.      Left Ear: Ear canal and external ear normal.      Ears:      Comments: Tympanostomy tubes in place bilaterally with no discharge present.       Nose: Congestion present.      Mouth/Throat:      Mouth: Mucous membranes are moist.      Pharynx: No oropharyngeal exudate or posterior oropharyngeal erythema.   Eyes:      Conjunctiva/sclera: Conjunctivae normal.   Cardiovascular:      Rate and Rhythm: Normal rate and regular rhythm.      Pulses: Normal pulses.      Heart sounds: Normal heart sounds.   Pulmonary:      Effort: Pulmonary effort is normal.      Breath sounds: Normal breath sounds.   Abdominal:      Palpations: Abdomen is soft.      Tenderness: There is " Cardiovascular Disease Progress Note    Mr. Schaefer is sitting up in the chair. He says the pain is manageable.   Otherwise review of systems negative    Objective Findings:  T(C): 37.4 (20 @ 04:00), Max: 37.4 (20 @ 04:00)  HR: 79 (20 @ 07:15) (68 - 110)  BP: 102/69 (20 @ 07:00) (93/64 - 114/78)  RR: 7 (20 @ 07:15) (7 - 26)  SpO2: 98% (20 @ 07:15) (92% - 100%)  Wt(kg): --  Daily     Daily Weight in k.6 (2020 00:00)      Physical Exam:  Gen: NAD; Patient resting comfortably  HEENT: EOMI, Normocephalic/ atraumatic  CV: RRR, normal S1 + S2, no m/r/g  Lungs:  Normal respiratory effort; decreased air entry to auscultation bilaterally  Abd: soft, non-tender; bowel sounds present  Ext: No edema; warm and well perfused    Telemetry: V-paced    Laboratory Data:                        10.2   13.11 )-----------( 91       ( 2020 01:19 )             29.9         136  |  106  |  14  ----------------------------<  121<H>  4.3   |  21<L>  |  1.09    Ca    8.4      2020 01:19  Phos  3.6       Mg     1.8         TPro  4.8<L>  /  Alb  3.3  /  TBili  1.7<H>  /  DBili  x   /  AST  87<H>  /  ALT  58<H>  /  AlkPhos  26<L>      PT/INR - ( :19 )   PT: 14.8 sec;   INR: 1.25 ratio         PTT - ( :19 )  PTT:29.0 sec  CARDIAC MARKERS ( 2020 12:39 )  x     / x     / 557 U/L / x     / 63.2 ng/mL          Inpatient Medications:  MEDICATIONS  (STANDING):  cefuroxime  IVPB 1500 milliGRAM(s) IV Intermittent every 8 hours  dexMEDEtomidine Infusion 0.2 MICROgram(s)/kG/Hr (3.84 mL/Hr) IV Continuous <Continuous>  HYDROmorphone PCA (1 mG/mL) 30 milliLiter(s) PCA Continuous PCA Continuous  metoclopramide Injectable 10 milliGRAM(s) IV Push every 8 hours  norepinephrine Infusion 0.05 MICROgram(s)/kG/Min (7.2 mL/Hr) IV Continuous <Continuous>  pantoprazole  Injectable 40 milliGRAM(s) IV Push daily  sodium chloride 0.9%. 1000 milliLiter(s) (10 mL/Hr) IV Continuous <Continuous>  vasopressin Infusion 0.002 Unit(s)/Min (0.1 mL/Hr) IV Continuous <Continuous>      Assessment: 60 year old man with HTN, CAD and mitral valve prolapse with severe regurgitation presents with SOB.    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  Status post MV repair with WILBER ligation on .  Successfully extubated.  Wean off Levophed.  Lactate WNL.     #CAD-  60% RCA lesion.  S/p CABG x 1 with SVG to RCA.  Start ASA and statin.     Rest of care as per CTICU.       Over 25 minutes spent on total encounter; more than 50% of the visit was spent counseling and/or coordinating care by the attending physician.      Luís Gold MD [facility]  Cardiovascular Disease  [phone] no abdominal tenderness.   Musculoskeletal:      Cervical back: Normal range of motion.   Lymphadenopathy:      Cervical: No cervical adenopathy.   Skin:     General: Skin is warm.      Capillary Refill: Capillary refill takes less than 2 seconds.   Neurological:      Mental Status: She is alert.     Assessment & Plan     Renita is 14 mo F with history of recurrent ear infections status post tympanostomy tube placement who presents for fever and vomiting.  The exact etiology is unclear.  It is good that urinalysis has already been performed and negative for urine infection.  Suspect the etiology to be viral but there is some posterior pharyngeal erythema on examination. Through shared decision-making, it was decided to obtain strep testing although this is not typically done under 2 years old given that she does attend  and the focality on exam.  Strep testing performed and negative.  Does not appear to be herpangina.  This could also be roseola.  It is reassuring that she has not needed Zofran and and is hydrating better today.  Family can keep provider updated over MyChart.  Advised continued supportive care with Motrin and Tylenol as needed as well as plenty of hydration.  Extensive return precautions discussed.  Family agrees with plan.     1. Viral syndrome    2. Fever in pediatric patient  - POCT GROUP A STREP, PCR    3. Decreased oral intake    4. Vomiting in pediatric patient

## 2024-01-08 NOTE — PATIENT PROFILE ADULT - HAS THE PATIENT USED TOBACCO IN THE PAST 30 DAYS?
Regional Block    Date/Time: 1/8/2024 10:53 AM    Performed by: Jason Henderson MD  Authorized by: Jason Henderson MD      General Information and Staff    Start Time:  1/8/2024 10:53 AM  End Time:  1/8/2024 10:55 AM  Anesthesiologist:  Jason Henderson MD  Performed by:  Anesthesiologist  Patient Location:  OR    Block Placement: Pre Induction  Site Identification: real time ultrasound guided and image stored and retrievable    Block site/laterality marked before start: site marked  Reason for Block: at surgeon's request and post-op pain management    Preanesthetic Checklist: 2 patient identifers, IV checked, risks and benefits discussed, monitors and equipment checked, pre-op evaluation, timeout performed, anesthesia consent, sterile technique used, no prohibitive neurological deficits and no local skin infection at insertion site      Procedure Details    Patient Position:  Supine  Prep: ChloraPrep    Monitoring:  Cardiac monitor, continuous pulse ox and blood pressure cuff  Block Type:  Interscalene  Laterality:  Left  Injection Technique:  Single-shot    Needle    Needle Type:  Short-bevel and echogenic  Needle Gauge:  21 G  Needle Length:  50 mm  Needle Localization:  Ultrasound guidance  Reason for Ultrasound Use: appropriate spread of the medication was noted in real time and no ultrasound evidence of intravascular and/or intraneural injection            Assessment    Injection Assessment:  Good spread noted, negative resistance, negative aspiration for heme, incremental injection, low pressure, local visualized surrounding nerve on ultrasound and no pain on injection  Paresthesia Pain:  None  Heart Rate Change: No    - Patient tolerated block procedure well without evidence of immediate block related complications.     Medications  1/8/2024 10:53 AM      Additional Comments    Medication:  Ropivacaine 0.5% 30mL  with 1:200,000 epi and dexamethasone 4mg PF.         Yes

## 2024-01-25 NOTE — CONSULT NOTE ADULT - SUBJECTIVE AND OBJECTIVE BOX
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 non-focal  SKIN: No rashes or lesions      INTERPRETATION OF TELEMETRY: Accelerated junctional rhythm    ECG: Accelerated junctional rhythm with CHB    I&O's Detail    2020 07:01  -  2020 07:00  --------------------------------------------------------  IN:    IV PiggyBack: 720 mL    IV PiggyBack: 337.5 mL    Norepinephrine: 6 mL    Oral Fluid: 710 mL    sodium chloride 0.9%: 240 mL  Total IN: 2013.5 mL    OUT:    Chest Tube (mL): 50 mL    Dexmedetomidine: 0 mL    Indwelling Catheter - Urethral (mL): 570 mL    Vasopressin: 0 mL    Voided (mL): 375 mL  Total OUT: 995 mL    Total NET: 1018.5 mL      2020 07:01  -  2020 10:55  --------------------------------------------------------  IN:    IV PiggyBack: 30 mL    IV PiggyBack: 62.5 mL    sodium chloride 0.9%: 10 mL  Total IN: 102.5 mL    OUT:  Total OUT: 0 mL    Total NET: 102.5 mL    LABS:                        9.9    19.42 )-----------( 86       ( 2020 00:47 )             29.9     -23    134<L>  |  102  |  14  ----------------------------<  120<H>  3.9   |  23  |  0.93    Ca    8.1<L>      2020 00:47  Phos  2.1       Mg     1.8         TPro  5.1<L>  /  Alb  3.1<L>  /  TBili  0.9  /  DBili  x   /  AST  57<H>  /  ALT  48<H>  /  AlkPhos  27<L>  23    CARDIAC MARKERS ( 2020 12:39 )  x     / x     / 557 U/L / x     / 63.2 ng/mL      PT/INR - ( 2020 01:19 )   PT: 14.8 sec;   INR: 1.25 ratio         PTT - ( 2020 01:19 )  PTT:29.0 sec    BNP  I&O's Detail    2020 07:01  -  2020 07:00  --------------------------------------------------------  IN:    IV PiggyBack: 720 mL    IV PiggyBack: 337.5 mL    Norepinephrine: 6 mL    Oral Fluid: 710 mL    sodium chloride 0.9%: 240 mL  Total IN: 2013.5 mL    OUT:    Chest Tube (mL): 50 mL    Dexmedetomidine: 0 mL    Indwelling Catheter - Urethral (mL): 570 mL    Vasopressin: 0 mL    Voided (mL): 375 mL  Total OUT: 995 mL    Total NET: 1018.5 mL      2020 07:01  -  2020 10:55  --------------------------------------------------------  IN:    IV PiggyBack: 30 mL    IV PiggyBack: 62.5 mL    sodium chloride 0.9%: 10 mL  Total IN: 102.5 mL    OUT:  Total OUT: 0 mL    Total NET: 102.5 mL      Daily     Daily Weight in k.2 (2020 00:00)    RADIOLOGY & ADDITIONAL STUDIES:

## 2024-02-15 NOTE — ASU PATIENT PROFILE, ADULT - FALL HARM RISK - TYPE OF ASSESSMENT
Admission Post-Procedure Instructions: Following the dermaplane procedure, Oxymist treatment was applied to the treatment areas. Moisturizer and SPF was applied. Price (Use Numbers Only, No Special Characters Or $): 60 Detail Level: Zone Blade: Lyon scalpel Post-Care Instructions: I reviewed with the patient in detail post-care instructions. Pre-Procedure Text: The patient was placed in a recumbant position on the procedure table. Treatment Areas: face and neck
